# Patient Record
Sex: FEMALE | Race: WHITE | NOT HISPANIC OR LATINO | Employment: OTHER | URBAN - METROPOLITAN AREA
[De-identification: names, ages, dates, MRNs, and addresses within clinical notes are randomized per-mention and may not be internally consistent; named-entity substitution may affect disease eponyms.]

---

## 2017-01-11 ENCOUNTER — TELEPHONE (OUTPATIENT)
Dept: FAMILY MEDICINE | Facility: CLINIC | Age: 47
End: 2017-01-11

## 2017-01-11 DIAGNOSIS — E11.9 TYPE 2 DIABETES MELLITUS WITHOUT COMPLICATION, WITHOUT LONG-TERM CURRENT USE OF INSULIN (H): Primary | ICD-10-CM

## 2017-01-11 NOTE — TELEPHONE ENCOUNTER
Reason for Call:  Other prescription    Detailed comments: pt is calling stating she needs a higher dose of dulaglutide (TRULICITY) 0.75 MG/0.5ML pen  Am sugars are still to high, they are running 170-200 in the am before breakfast   Was 110 on other medication      Phone Number Patient can be reached at: Home number on file [phone] (home)    Best Time: any     Can we leave a detailed message on this number? YES    Call taken on 1/11/2017 at 9:07 AM by Erika Navarrete

## 2017-01-11 NOTE — TELEPHONE ENCOUNTER
Oksana,    Are you able to help patient with her trulicity dosing?  Looks like at her last meeting with you she was switched from Tanzeum to trulicity.  Her BS are still running high - 170-200 before breakfast.    11-30-16:  Current Diabetes Management per Patient:  Taking diabetes medications? yes, Oral Medications: Metformin - Dose:  mg 1000 mg , Time: breakfast and supper, Injectable Medications: Tanzeum 50mg once weekly, will switch to Trulicity 0.75 mg     Thank you!  Ana STEVENS RN

## 2017-01-11 NOTE — TELEPHONE ENCOUNTER
I did order her Trulicity of 1.5 mg weekly and sent it to the [facility] pharmacy.  Will discontinue the 0.75 mg.  Most patients will need the higher dose.  We need to give this about 4-6 wks to see effect.    Oksana Cook RN/JULIANE  [place] [facility] Educator

## 2017-01-12 ENCOUNTER — TELEPHONE (OUTPATIENT)
Dept: FAMILY MEDICINE | Facility: CLINIC | Age: 47
End: 2017-01-12

## 2017-01-12 NOTE — TELEPHONE ENCOUNTER
Reason for Call:  Other call back    Detailed comments: She has [employer] and the Trulicity is costing her $600.00 per month. She is wondering if she can get a coupon or something simmular that [employer] will cover.  Please advise.  This can go to [facility] too.    Phone Number Patient can be reached at: Cell number on file:    Telephone Information:   Mobile [phone]       Best Time: any    Can we leave a detailed message on this number? YES    Call taken on 1/12/2017 at 12:38 PM by Madison Kam

## 2017-01-12 NOTE — TELEPHONE ENCOUNTER
Oksana,    Patient has been on this since 11/2015.  Dose increased yesterday from 0.75mg to 1.5mg weekly yesterday.  She states this medication will cost her $600.00/mo.  Is there an alternative/coupon/program option for patient?  Patient is open to ideas, she does like the once weekly dosing of trulicity.  She does have enough insulin until Oksana is back in clinic.  Her next dose is Sunday and has enough insulin for Sunday at the 1.5mg dose.  Yun Cook, RN at 1/11/2017 11:18 AM      Status: Signed         Expand All Collapse All    I did order her Trulicity of 1.5 mg weekly and sent it to the [facility] pharmacy.  Will discontinue the 0.75 mg.  Most patients will need the higher dose.  We need to give this about 4-6 wks to see effect.    Oksana Cook RN/HAIDER  [place] [facility] Educator           Routing to provider.  Ana STEVENS RN

## 2017-01-17 NOTE — TELEPHONE ENCOUNTER
Pt is calling wanting to know what to due since she will run out of medication at the end of the week   She is available on her cell phone   She knows PCP is out of the office today   Please advise     Erika Ko  [facility] [place]

## 2017-01-18 ENCOUNTER — TELEPHONE (OUTPATIENT)
Dept: EDUCATION SERVICES | Facility: CLINIC | Age: 47
End: 2017-01-18

## 2017-01-18 DIAGNOSIS — E11.9 TYPE 2 DIABETES MELLITUS WITHOUT COMPLICATION, WITHOUT LONG-TERM CURRENT USE OF INSULIN (H): Primary | ICD-10-CM

## 2017-01-18 RX ORDER — LIRAGLUTIDE 6 MG/ML
1.2 INJECTION SUBCUTANEOUS DAILY
Qty: 6 ML | Refills: 3 | Status: SHIPPED | OUTPATIENT
Start: 2017-01-18 | End: 2017-01-18 | Stop reason: ALTCHOICE

## 2017-01-18 NOTE — TELEPHONE ENCOUNTER
I called May, she had lost the savings card I had given her, she is coming in to get a new one.  The cost may be due to her high deductible.  She will try the savings card, and I did have a coupon for a free month as well.  She is to check on Victoza as well for coverage.  She really does want the once per week Trulicity though.    She is to call us back with the results.    Oksana Cook RN/JULIANE  [place] [facility] Educator

## 2017-01-18 NOTE — TELEPHONE ENCOUNTER
I called May back, she did not wish Victoza, she was confused, wanted to go back on her Tanzeum.  I did put in RX for Tanzeum 50 mg which she was on before Trulicity.  She can use a free voucher for Trulicity and will come to pick this up.  I did tell her she needs to call her insurance company to find out the costs of RX before she has pharmacy fill her RX and then she is upset because they cost so much.  I explained about Deductibles and she was very upset that she pays so much for insurance and still needs to pay deductibles.  I mentioned that she needed to discuss this with her insurance company.      Oksana Cook RN/JULIANE  [place] [facility] Educator

## 2017-01-18 NOTE — TELEPHONE ENCOUNTER
Called May,  She lost the savings card I had given her she will try this and she has a free voucher for one month free.  She will come to the clinic to  her coupons.  I did ask her to call her new insurance company [employer] to see if they cover Trulicity or Victoza.  But she wants to use the once per week pen.    Oksana Cook RN/HAIDER  [place] [facility] Educator

## 2017-01-18 NOTE — TELEPHONE ENCOUNTER
Pt wants Victoza order.  Hoping this is covered by her Insurance?  Will not do Trulicity.  Need Victoza order.  Advise.  KPavelNICK

## 2017-02-23 DIAGNOSIS — E11.9 TYPE 2 DIABETES MELLITUS WITHOUT COMPLICATION, WITHOUT LONG-TERM CURRENT USE OF INSULIN (H): ICD-10-CM

## 2017-02-23 RX ORDER — METFORMIN HCL 500 MG
1000 TABLET, EXTENDED RELEASE 24 HR ORAL 2 TIMES DAILY WITH MEALS
Qty: 120 TABLET | Refills: 1 | Status: SHIPPED | OUTPATIENT
Start: 2017-02-23 | End: 2017-05-01

## 2017-02-23 NOTE — TELEPHONE ENCOUNTER
Metformin ER 500mg         Last Written Prescription Date: 10/17/16  Last Fill Quantity: 120, # refills: 3  Last Office Visit with G, P or [facility] prescribing provider:  10/17/16        BP Readings from Last 3 Encounters:   10/17/16 108/52   03/30/16 132/82   02/29/16 136/88     Lab Results   Component Value Date    MICROL 28 10/17/2016     No results found for: MICROALBUMIN  Creatinine   Date Value Ref Range Status   10/17/2016 0.62 0.52 - 1.04 mg/dL Final   ]  GFR Estimate   Date Value Ref Range Status   10/17/2016 >90  Non  GFR Calc   >60 mL/min/1.7m2 Final   10/19/2015 >90  Non  GFR Calc   >60 mL/min/1.7m2 Final   11/17/2014 >90  Non  GFR Calc   >60 mL/min/1.7m2 Final     GFR Estimate If Black   Date Value Ref Range Status   10/17/2016 >90   GFR Calc   >60 mL/min/1.7m2 Final   10/19/2015 >90   GFR Calc   >60 mL/min/1.7m2 Final   11/17/2014 >90   GFR Calc   >60 mL/min/1.7m2 Final     Lab Results   Component Value Date    CHOL 141 02/13/2014     Lab Results   Component Value Date    HDL 29 02/13/2014     Lab Results   Component Value Date    LDL 56 10/17/2016    LDL 43 02/13/2014     Lab Results   Component Value Date    TRIG 342 02/13/2014     Lab Results   Component Value Date    CHOLHDLRATIO 5.0 02/13/2014     Lab Results   Component Value Date    AST 36 08/27/2010     Lab Results   Component Value Date    ALT 30 01/06/2012     Lab Results   Component Value Date    A1C 7.5 10/17/2016    A1C 7.7 02/29/2016    A1C 7.2 10/19/2015    A1C 7.5 05/13/2015    A1C 6.4 11/17/2014     Potassium   Date Value Ref Range Status   10/17/2016 4.0 3.4 - 5.3 mmol/L Final       Thank You-  Holley Ovalles, [facility]

## 2017-03-10 ENCOUNTER — OFFICE VISIT (OUTPATIENT)
Dept: FAMILY MEDICINE | Facility: CLINIC | Age: 47
End: 2017-03-10
Payer: COMMERCIAL

## 2017-03-10 VITALS
SYSTOLIC BLOOD PRESSURE: 130 MMHG | DIASTOLIC BLOOD PRESSURE: 89 MMHG | RESPIRATION RATE: 18 BRPM | BODY MASS INDEX: 29.84 KG/M2 | HEIGHT: 59 IN | TEMPERATURE: 97 F | WEIGHT: 148 LBS | HEART RATE: 90 BPM

## 2017-03-10 DIAGNOSIS — R14.0 BLOATING SYMPTOM: ICD-10-CM

## 2017-03-10 DIAGNOSIS — R19.7 INTERMITTENT DIARRHEA: ICD-10-CM

## 2017-03-10 DIAGNOSIS — R19.7 DIARRHEA, UNSPECIFIED TYPE: ICD-10-CM

## 2017-03-10 DIAGNOSIS — R10.84 ABDOMINAL PAIN, GENERALIZED: Primary | ICD-10-CM

## 2017-03-10 DIAGNOSIS — E78.5 HYPERLIPIDEMIA LDL GOAL <100: ICD-10-CM

## 2017-03-10 DIAGNOSIS — E11.9 TYPE 2 DIABETES MELLITUS WITHOUT COMPLICATION, WITHOUT LONG-TERM CURRENT USE OF INSULIN (H): ICD-10-CM

## 2017-03-10 LAB
ALBUMIN SERPL-MCNC: 3.9 G/DL (ref 3.4–5)
ALP SERPL-CCNC: 71 U/L (ref 40–150)
ALT SERPL W P-5'-P-CCNC: 68 U/L (ref 0–50)
ANION GAP SERPL CALCULATED.3IONS-SCNC: 7 MMOL/L (ref 3–14)
AST SERPL W P-5'-P-CCNC: 30 U/L (ref 0–45)
BASOPHILS # BLD AUTO: 0 10E9/L (ref 0–0.2)
BASOPHILS NFR BLD AUTO: 0.6 %
BILIRUB SERPL-MCNC: 0.6 MG/DL (ref 0.2–1.3)
BUN SERPL-MCNC: 8 MG/DL (ref 7–30)
CALCIUM SERPL-MCNC: 8.5 MG/DL (ref 8.5–10.1)
CHLORIDE SERPL-SCNC: 101 MMOL/L (ref 94–109)
CO2 SERPL-SCNC: 28 MMOL/L (ref 20–32)
CREAT SERPL-MCNC: 0.58 MG/DL (ref 0.52–1.04)
DIFFERENTIAL METHOD BLD: NORMAL
EOSINOPHIL # BLD AUTO: 0.4 10E9/L (ref 0–0.7)
EOSINOPHIL NFR BLD AUTO: 5.7 %
ERYTHROCYTE [DISTWIDTH] IN BLOOD BY AUTOMATED COUNT: 12.5 % (ref 10–15)
GFR SERPL CREATININE-BSD FRML MDRD: ABNORMAL ML/MIN/1.7M2
GLUCOSE SERPL-MCNC: 221 MG/DL (ref 70–99)
HCT VFR BLD AUTO: 37.8 % (ref 35–47)
HGB BLD-MCNC: 12.8 G/DL (ref 11.7–15.7)
LYMPHOCYTES # BLD AUTO: 2 10E9/L (ref 0.8–5.3)
LYMPHOCYTES NFR BLD AUTO: 27.3 %
MCH RBC QN AUTO: 30 PG (ref 26.5–33)
MCHC RBC AUTO-ENTMCNC: 33.9 G/DL (ref 31.5–36.5)
MCV RBC AUTO: 89 FL (ref 78–100)
MONOCYTES # BLD AUTO: 0.6 10E9/L (ref 0–1.3)
MONOCYTES NFR BLD AUTO: 7.7 %
NEUTROPHILS # BLD AUTO: 4.2 10E9/L (ref 1.6–8.3)
NEUTROPHILS NFR BLD AUTO: 58.7 %
PLATELET # BLD AUTO: 170 10E9/L (ref 150–450)
POTASSIUM SERPL-SCNC: 3.9 MMOL/L (ref 3.4–5.3)
PROT SERPL-MCNC: 7.1 G/DL (ref 6.8–8.8)
RBC # BLD AUTO: 4.27 10E12/L (ref 3.8–5.2)
SODIUM SERPL-SCNC: 136 MMOL/L (ref 133–144)
WBC # BLD AUTO: 7.2 10E9/L (ref 4–11)

## 2017-03-10 PROCEDURE — 80053 COMPREHEN METABOLIC PANEL: CPT | Performed by: FAMILY MEDICINE

## 2017-03-10 PROCEDURE — 99214 OFFICE O/P EST MOD 30 MIN: CPT | Performed by: FAMILY MEDICINE

## 2017-03-10 PROCEDURE — 36415 COLL VENOUS BLD VENIPUNCTURE: CPT | Performed by: FAMILY MEDICINE

## 2017-03-10 PROCEDURE — 85025 COMPLETE CBC W/AUTO DIFF WBC: CPT | Performed by: FAMILY MEDICINE

## 2017-03-10 NOTE — PROGRESS NOTES
"  SUBJECTIVE:                                                    May Wade is a 46 year old female who presents to clinic today for the following health issues:      ABDOMINAL PAIN     Onset: 3 weeks    Description:   Character: Dull ache and Cramping  Location: mid lower abdomen  Radiation: None    Intensity: moderate 5/10    Progression of Symptoms:  worsening    Accompanying Signs & Symptoms:  Fever/Chills?: no   Gas/Bloating: no   Nausea: no   Vomitting: no   Diarrhea?: YES  Constipation:no   Dysuria or Hematuria: no    History:   Trauma: no   Previous similar pain: no    Previous tests done: none    Precipitating factors:   Does the pain change with:     Food: no      BM: YES- feels better    Urination: no     Alleviating factors:      Therapies Tried and outcome:     LMP:  not applicable      diarrhea - waking twice overnight to have loose/watery stools and still several during the day.  This has improved over the past two weeks but still has the lower abdominal bloating/cramping.     No periods after endometrial ablation.    Blood sugars have been okay.    Prior to this bowel movements were normal, no blood, normal caliber.    No recent travel (other than to [place]).     No fever/chills.      /89  Pulse 90  Temp 97  F (36.1  C) (Tympanic)  Resp 18  Ht 4' 11\" (1.499 m)  Wt 148 lb (67.1 kg)  BMI 29.89 kg/m2  EXAM: GENERAL APPEARANCE: Alert, no acute distress  RESP: lungs clear to auscultation   CV: normal rate, regular rhythm, no murmur or gallop  ABDOMEN: soft, no organomegaly or masses , tender lower abdomen mild no rebound or guarding       ASSESSMENT/PLAN:      ICD-10-CM    1. Abdominal pain, generalized R10.84 CBC with platelets differential     Comprehensive metabolic panel   2. Diarrhea, unspecified type R19.7 CBC with platelets differential     Ova and Parasite Exam Routine     Enteric Bacteria and Virus Panel by HAYLEY Stool     Clostridium difficile Toxin B PCR   3. Intermittent diarrhea " R19.7    4. Type 2 diabetes mellitus without complication, without long-term current use of insulin (H) E11.9 Comprehensive metabolic panel     Hemoglobin A1c   5. Hyperlipidemia LDL goal <100 E78.5    6. Bloating symptom R14.0 US Pelvic Complete w Transvaginal     Etiology uncertain.  Evaluate WBC.  At this time non-acute abdomen.  Check stool studies, add probiotics, minimize lactose.  Check CMP to evaluate liver/kidneys.     I think it would be reasonable to try immodium at this point for short term.  May need colonoscopy if no etiology discovered and no improvement in symptoms.      Julia Reyna M.D.      Patient Instructions   Probiotics - twice a day until the diarrhea is better and then daily    Stop dairy temporarily    immodium as needed     Return stool studies.        Return in 2 months or so for your HgbA1c            Thank you for choosing [facility].  You may be receiving a survey in the mail from [employer] regarding your visit today.  Please take a few minutes to complete and return the survey to let us know how we are doing.      Our Clinic hours are:  Mondays    7:20 am - 7 pm  Tues -  Fri  7:20 am - 5 pm    Clinic Phone: [phone]    The clinic lab opens at 7:30 am Mon - Fri and appointments are required.    [facility]. [phone]  Monday-Thursday 8 am - 7pm  Tues/Wed/Fri 8 am - 5:30 pm

## 2017-03-10 NOTE — MR AVS SNAPSHOT
After Visit Summary   3/10/2017    May Wade    MRN: [id number]           Patient Information     Date Of Birth          1970        Visit Information        Provider Department      3/10/2017 11:20 AM Julia Reyna MD [facility]        Today's Diagnoses     Abdominal pain, generalized    -  1    Diarrhea, unspecified type        Intermittent diarrhea        Type 2 diabetes mellitus without complication, without long-term current use of insulin (H)        Hyperlipidemia LDL goal <100        Bloating symptom          Care Instructions    Probiotics - twice a day until the diarrhea is better and then daily    Stop dairy temporarily    immodium as needed     Return stool studies.        Return in 2 months or so for your HgbA1c            Thank you for choosing [facility].  You may be receiving a survey in the mail from [employer] regarding your visit today.  Please take a few minutes to complete and return the survey to let us know how we are doing.      Our Clinic hours are:  Mondays    7:20 am - 7 pm  Tues -  Fri  7:20 am - 5 pm    Clinic Phone: [phone]    The clinic lab opens at 7:30 am Mon - Fri and appointments are required.    [facility] [place]  Ph. [phone]  Monday-Thursday 8 am - 7pm  Tues/Wed/Fri 8 am - 5:30 pm               Follow-ups after your visit        Future tests that were ordered for you today     Open Future Orders        Priority Expected Expires Ordered    US Pelvic Complete w Transvaginal Routine 6/8/2017 3/10/2018 3/10/2017    Ova and Parasite Exam Routine Routine  3/10/2018 3/10/2017    Enteric Bacteria and Virus Panel by HAYLEY Stool Routine  3/10/2018 3/10/2017    Clostridium difficile Toxin B PCR Routine  4/10/2017 3/10/2017    Hemoglobin A1c Routine 6/10/2017 9/10/2017 3/10/2017            Who to contact     If you have questions or need follow up information about today's clinic visit or your schedule please contact  "[facility] directly at [phone].  Normal or non-critical lab and imaging results will be communicated to you by MyChart, letter or phone within 4 business days after the clinic has received the results. If you do not hear from us within 7 days, please contact the clinic through [employer] or phone. If you have a critical or abnormal lab result, we will notify you by phone as soon as possible.  Submit refill requests through [employer] or call your pharmacy and they will forward the refill request to us. Please allow 3 business days for your refill to be completed.          Additional Information About Your Visit        [employer] ([employer]) Information     [employer] lets you send messages to your doctor, view your test results, renew your prescriptions, schedule appointments and more. To sign up, go to [email or web address] . Click on \"Log in\" on the left side of the screen, which will take you to the Welcome page. Then click on \"Sign up Now\" on the right side of the page.     You will be asked to enter the access code listed below, as well as some personal information. Please follow the directions to create your username and password.     Your access code is: [email or web address]  Expires: 2017 11:59 AM     Your access code will  in 90 days. If you need help or a new code, please call your [place] clinic or [phone].        Care EveryWhere ID     This is your Care EveryWhere ID. This could be used by other organizations to access your [place] medical records  [id number]        Your Vitals Were     Pulse Temperature Respirations Height BMI (Body Mass Index)       90 97  F (36.1  C) (Tympanic) 18 4' 11\" (1.499 m) 29.89 kg/m2        Blood Pressure from Last 3 Encounters:   03/10/17 (!) 130/91   10/17/16 108/52   16 132/82    Weight from Last 3 Encounters:   03/10/17 148 lb (67.1 kg)   10/17/16 148 lb (67.1 kg)   16 151 lb (68.5 kg)              We Performed the Following     CBC with platelets " differential     Comprehensive metabolic panel          Today's Medication Changes          These changes are accurate as of: 3/10/17 11:59 AM.  If you have any questions, ask your nurse or doctor.               These medicines have changed or have updated prescriptions.        Dose/Directions    albiglutide 50 MG pen   Commonly known as:  TANZEUM   This may have changed:  Another medication with the same name was removed. Continue taking this medication, and follow the directions you see here.   Used for:  Type 2 diabetes mellitus without complication, without long-term current use of insulin (H)   Changed by:  Yun Cook RN        Dose:  50 mg   Inject 50 mg Subcutaneous once a week   Quantity:  4 each   Refills:  3         Stop taking these medicines if you haven't already. Please contact your care team if you have questions.     dulaglutide 0.75 MG/0.5ML pen   Commonly known as:  TRULICITY   Stopped by:  Julia Reyna MD           dulaglutide 1.5 MG/0.5ML pen   Commonly known as:  TRULICITY   Stopped by:  Julia Reyna MD           oxymetazoline 0.05 % spray   Commonly known as:  AFRIN   Stopped by:  Julia Reyna MD                    Primary Care Provider Office Phone # Fax #    Julia Reyna [phone]       [address] [address]        Thank you!     Thank you for choosing [facility]  for your care. Our goal is always to provide you with excellent care. Hearing back from our patients is one way we can continue to improve our services. Please take a few minutes to complete the written survey that you may receive in the mail after your visit with us. Thank you!             Your Updated Medication List - Protect others around you: Learn how to safely use, store and throw away your medicines at www.disposemymeds.org.          This list is accurate as of: 3/10/17 11:59 AM.  Always use your most recent med list.                    Brand Name Dispense Instructions for use    albiglutide 50 MG pen    TANZEUM    4 each    Inject 50 mg Subcutaneous once a week       blood glucose monitoring lancets     1 Box    Use to test blood sugar 2 times daily or as directed.  Ok to substitute alternative if insurance prefers.       * blood glucose monitoring meter device kit     1 kit    Use to test blood sugars 2 times daily or as directed.       * blood glucose monitoring meter device kit     1 kit    Use to test blood sugars 4 daily as directed.       * blood glucose monitoring test strip    no brand specified    1 Box    1 strip by In Vitro route 2 times daily **now using Yunier Contour Next**       * blood glucose monitoring test strip    ACCU-CHEK SANDRA PLUS    200 strip    Use to test blood sugar 2 times daily or as directed.  Ok to substitute alternative if insurance prefers.       fish oil-omega-3 fatty acids 1000 MG capsule     360 capsule    Take 2 capsules by mouth 2 times daily.       * insulin pen needle 31G X 5 MM     1 Box    Use with lantus at bedtime       * insulin pen needle 32G X 4 MM    BD YULIYA U/F    100 each    Use 1 daily as directed.       losartan 25 MG tablet    COZAAR    90 tablet    Take 1 tablet (25 mg) by mouth daily       metFORMIN 500 MG 24 hr tablet    GLUCOPHAGE-XR    120 tablet    Take 2 tablets (1,000 mg) by mouth 2 times daily (with meals)       omeprazole 20 MG tablet     90 tablet    Take 1 tablet (20 mg) by mouth daily Take 30-60 minutes before a meal.       simvastatin 40 MG tablet    ZOCOR    90 tablet    Take 1 tablet (40 mg) by mouth At Bedtime       * Notice:  This list has 6 medication(s) that are the same as other medications prescribed for you. Read the directions carefully, and ask your doctor or other care provider to review them with you.

## 2017-03-10 NOTE — PATIENT INSTRUCTIONS
Probiotics - twice a day until the diarrhea is better and then daily    Stop dairy temporarily    immodium as needed     Return stool studies.        Return in 2 months or so for your HgbA1c            Thank you for choosing [facility].  You may be receiving a survey in the mail from [email or web address] regarding your visit today.  Please take a few minutes to complete and return the survey to let us know how we are doing.      Our Clinic hours are:  Mondays    7:20 am - 7 pm  Tues -  Fri  7:20 am - 5 pm    Clinic Phone: [phone]    The clinic lab opens at 7:30 am Mon - Fri and appointments are required.    [facility] [place]  Ph. [phone]  Monday-Thursday 8 am - 7pm  Tues/Wed/Fri 8 am - 5:30 pm

## 2017-03-10 NOTE — NURSING NOTE
"Chief Complaint   Patient presents with     Abdominal Pain       Initial BP (!) 130/91  Pulse 90  Temp 97  F (36.1  C) (Tympanic)  Resp 18  Ht 4' 11\" (1.499 m)  Wt 148 lb (67.1 kg)  BMI 29.89 kg/m2 Estimated body mass index is 29.89 kg/(m^2) as calculated from the following:    Height as of this encounter: 4' 11\" (1.499 m).    Weight as of this encounter: 148 lb (67.1 kg).  Medication Reconciliation: complete    "

## 2017-03-20 ENCOUNTER — HOSPITAL ENCOUNTER (OUTPATIENT)
Dept: ULTRASOUND IMAGING | Facility: CLINIC | Age: 47
Discharge: HOME | End: 2017-03-20
Attending: FAMILY MEDICINE | Admitting: FAMILY MEDICINE
Payer: COMMERCIAL

## 2017-03-20 DIAGNOSIS — R14.0 BLOATING SYMPTOM: ICD-10-CM

## 2017-03-20 PROCEDURE — 76830 TRANSVAGINAL US NON-OB: CPT

## 2017-03-21 DIAGNOSIS — R19.7 DIARRHEA, UNSPECIFIED TYPE: ICD-10-CM

## 2017-03-21 PROCEDURE — 87493 C DIFF AMPLIFIED PROBE: CPT | Performed by: FAMILY MEDICINE

## 2017-03-21 PROCEDURE — 87506 IADNA-DNA/RNA PROBE TQ 6-11: CPT | Performed by: FAMILY MEDICINE

## 2017-03-21 PROCEDURE — 87177 OVA AND PARASITES SMEARS: CPT | Performed by: FAMILY MEDICINE

## 2017-03-21 PROCEDURE — 87209 SMEAR COMPLEX STAIN: CPT | Performed by: FAMILY MEDICINE

## 2017-03-22 LAB
C DIFF TOX B STL QL: NORMAL
CAMPYLOBACTER GROUP BY NAT: NOT DETECTED
ENTERIC PATHOGEN COMMENT: NORMAL
MICRO REPORT STATUS: NORMAL
NOROVIRUS I AND II BY NAT: NOT DETECTED
O+P STL MICRO: NORMAL
ROTAVIRUS A BY NAT: NOT DETECTED
SALMONELLA SPECIES BY NAT: NOT DETECTED
SHIGA TOXIN 1 GENE BY NAT: NOT DETECTED
SHIGA TOXIN 2 GENE BY NAT: NOT DETECTED
SHIGELLA SP+EIEC IPAH STL QL NAA+PROBE: NOT DETECTED
SPECIMEN SOURCE: NORMAL
SPECIMEN SOURCE: NORMAL
VIBRIO GROUP BY NAT: NOT DETECTED
YERSINIA ENTEROCOLITICA BY NAT: NOT DETECTED

## 2017-03-27 ENCOUNTER — RADIANT APPOINTMENT (OUTPATIENT)
Dept: MAMMOGRAPHY | Facility: CLINIC | Age: 47
End: 2017-03-27
Attending: FAMILY MEDICINE
Payer: COMMERCIAL

## 2017-03-27 DIAGNOSIS — Z12.31 VISIT FOR SCREENING MAMMOGRAM: ICD-10-CM

## 2017-03-27 PROCEDURE — G0202 SCR MAMMO BI INCL CAD: HCPCS | Mod: TC

## 2017-05-01 DIAGNOSIS — E11.9 TYPE 2 DIABETES MELLITUS WITHOUT COMPLICATION, WITHOUT LONG-TERM CURRENT USE OF INSULIN (H): ICD-10-CM

## 2017-05-01 DIAGNOSIS — I10 ESSENTIAL HYPERTENSION WITH GOAL BLOOD PRESSURE LESS THAN 140/90: ICD-10-CM

## 2017-05-01 DIAGNOSIS — R05.9 COUGH: ICD-10-CM

## 2017-05-01 NOTE — TELEPHONE ENCOUNTER
losartan (COZAAR) 25 MG tablet      Last Written Prescription Date: 10/17/16  Last Fill Quantity: 90, # refills: 1  Last Office Visit with [facility], [facility] or [facility] prescribing provider: 3/10/17       Potassium   Date Value Ref Range Status   03/10/2017 3.9 3.4 - 5.3 mmol/L Final     Creatinine   Date Value Ref Range Status   03/10/2017 0.58 0.52 - 1.04 mg/dL Final     BP Readings from Last 3 Encounters:   03/10/17 130/89   10/17/16 108/52   03/30/16 132/82     metFORMIN (GLUCOPHAGE-XR) 500 MG 24 hr tablet         Last Written Prescription Date: 2/33/17  Last Fill Quantity: 120, # refills: 1  Last Office Visit with [facility], [facility] or [facility] prescribing provider:  3/10/17        BP Readings from Last 3 Encounters:   03/10/17 130/89   10/17/16 108/52   03/30/16 132/82     Lab Results   Component Value Date    MICROL 28 10/17/2016     Lab Results   Component Value Date    UMALCR 12.74 10/17/2016     Creatinine   Date Value Ref Range Status   03/10/2017 0.58 0.52 - 1.04 mg/dL Final   ]  GFR Estimate   Date Value Ref Range Status   03/10/2017 >90  Non  GFR Calc   >60 mL/min/1.7m2 Final   10/17/2016 >90  Non  GFR Calc   >60 mL/min/1.7m2 Final   10/19/2015 >90  Non  GFR Calc   >60 mL/min/1.7m2 Final     GFR Estimate If Black   Date Value Ref Range Status   03/10/2017 >90   GFR Calc   >60 mL/min/1.7m2 Final   10/17/2016 >90   GFR Calc   >60 mL/min/1.7m2 Final   10/19/2015 >90   GFR Calc   >60 mL/min/1.7m2 Final     Lab Results   Component Value Date    CHOL 141 02/13/2014     Lab Results   Component Value Date    HDL 29 02/13/2014     Lab Results   Component Value Date    LDL 56 10/17/2016    LDL 43 02/13/2014     Lab Results   Component Value Date    TRIG 342 02/13/2014     Lab Results   Component Value Date    CHOLHDLRATIO 5.0 02/13/2014     Lab Results   Component Value Date    AST 30 03/10/2017     Lab Results   Component Value Date     ALT 68 03/10/2017     Lab Results   Component Value Date    A1C 7.5 10/17/2016    A1C 7.7 02/29/2016    A1C 7.2 10/19/2015    A1C 7.5 05/13/2015    A1C 6.4 11/17/2014     Potassium   Date Value Ref Range Status   03/10/2017 3.9 3.4 - 5.3 mmol/L Final       omeprazole 20 MG tablet      Last Written Prescription Date: 10/17/16  Last Fill Quantity: 90,  # refills: 1   Last Office Visit with [facility], [facility] or [facility] prescribing provider: 3/10/17

## 2017-05-02 RX ORDER — LOSARTAN POTASSIUM 25 MG/1
TABLET ORAL
Qty: 90 TABLET | Refills: 0 | Status: SHIPPED | OUTPATIENT
Start: 2017-05-02 | End: 2017-08-15

## 2017-05-02 RX ORDER — METFORMIN HCL 500 MG
TABLET, EXTENDED RELEASE 24 HR ORAL
Qty: 120 TABLET | Refills: 0 | Status: SHIPPED | OUTPATIENT
Start: 2017-05-02 | End: 2017-06-07

## 2017-05-02 NOTE — TELEPHONE ENCOUNTER
Prescription approved per [facility] Refill Protocol.    Yaneli CUNNINGHAM RN

## 2017-05-30 DIAGNOSIS — E11.9 TYPE 2 DIABETES MELLITUS WITHOUT COMPLICATION, WITHOUT LONG-TERM CURRENT USE OF INSULIN (H): ICD-10-CM

## 2017-05-31 NOTE — TELEPHONE ENCOUNTER
albiglutide (TANZEUM) 50 MG pen         Last Written Prescription Date: 1/18/47  Last Fill Quantity: 4, # refills: 3  Last Office Visit with G, P or [facility] prescribing provider:  3/10/15        BP Readings from Last 3 Encounters:   03/10/17 130/89   10/17/16 108/52   03/30/16 132/82     Lab Results   Component Value Date    MICROL 28 10/17/2016     Lab Results   Component Value Date    UMALCR 12.74 10/17/2016     Creatinine   Date Value Ref Range Status   03/10/2017 0.58 0.52 - 1.04 mg/dL Final   ]  GFR Estimate   Date Value Ref Range Status   03/10/2017 >90  Non  GFR Calc   >60 mL/min/1.7m2 Final   10/17/2016 >90  Non  GFR Calc   >60 mL/min/1.7m2 Final   10/19/2015 >90  Non  GFR Calc   >60 mL/min/1.7m2 Final     GFR Estimate If Black   Date Value Ref Range Status   03/10/2017 >90   GFR Calc   >60 mL/min/1.7m2 Final   10/17/2016 >90   GFR Calc   >60 mL/min/1.7m2 Final   10/19/2015 >90   GFR Calc   >60 mL/min/1.7m2 Final     Lab Results   Component Value Date    CHOL 141 02/13/2014     Lab Results   Component Value Date    HDL 29 02/13/2014     Lab Results   Component Value Date    LDL 56 10/17/2016    LDL 43 02/13/2014     Lab Results   Component Value Date    TRIG 342 02/13/2014     Lab Results   Component Value Date    CHOLHDLRATIO 5.0 02/13/2014     Lab Results   Component Value Date    AST 30 03/10/2017     Lab Results   Component Value Date    ALT 68 03/10/2017     Lab Results   Component Value Date    A1C 7.5 10/17/2016    A1C 7.7 02/29/2016    A1C 7.2 10/19/2015    A1C 7.5 05/13/2015    A1C 6.4 11/17/2014     Potassium   Date Value Ref Range Status   03/10/2017 3.9 3.4 - 5.3 mmol/L Final

## 2017-06-07 DIAGNOSIS — E11.9 TYPE 2 DIABETES MELLITUS WITHOUT COMPLICATION, WITHOUT LONG-TERM CURRENT USE OF INSULIN (H): ICD-10-CM

## 2017-06-07 RX ORDER — METFORMIN HCL 500 MG
1000 TABLET, EXTENDED RELEASE 24 HR ORAL 2 TIMES DAILY WITH MEALS
Qty: 120 TABLET | Refills: 0 | Status: SHIPPED | OUTPATIENT
Start: 2017-06-07 | End: 2017-07-07

## 2017-06-07 NOTE — TELEPHONE ENCOUNTER
Metformin         Last Written Prescription Date: 05/02/17  Last Fill Quantity: 120, # refills: 0  Last Office Visit with [facility], [facility] or [facility] prescribing provider:  03/10/17        BP Readings from Last 3 Encounters:   03/10/17 130/89   10/17/16 108/52   03/30/16 132/82     Lab Results   Component Value Date    MICROL 28 10/17/2016     Lab Results   Component Value Date    UMALCR 12.74 10/17/2016     Creatinine   Date Value Ref Range Status   03/10/2017 0.58 0.52 - 1.04 mg/dL Final   ]  GFR Estimate   Date Value Ref Range Status   03/10/2017 >90  Non  GFR Calc   >60 mL/min/1.7m2 Final   10/17/2016 >90  Non  GFR Calc   >60 mL/min/1.7m2 Final   10/19/2015 >90  Non  GFR Calc   >60 mL/min/1.7m2 Final     GFR Estimate If Black   Date Value Ref Range Status   03/10/2017 >90   GFR Calc   >60 mL/min/1.7m2 Final   10/17/2016 >90   GFR Calc   >60 mL/min/1.7m2 Final   10/19/2015 >90   GFR Calc   >60 mL/min/1.7m2 Final     Lab Results   Component Value Date    CHOL 141 02/13/2014     Lab Results   Component Value Date    HDL 29 02/13/2014     Lab Results   Component Value Date    LDL 56 10/17/2016    LDL 43 02/13/2014     Lab Results   Component Value Date    TRIG 342 02/13/2014     Lab Results   Component Value Date    CHOLHDLRATIO 5.0 02/13/2014     Lab Results   Component Value Date    AST 30 03/10/2017     Lab Results   Component Value Date    ALT 68 03/10/2017     Lab Results   Component Value Date    A1C 7.5 10/17/2016    A1C 7.7 02/29/2016    A1C 7.2 10/19/2015    A1C 7.5 05/13/2015    A1C 6.4 11/17/2014     Potassium   Date Value Ref Range Status   03/10/2017 3.9 3.4 - 5.3 mmol/L Final

## 2017-06-27 ENCOUNTER — OFFICE VISIT (OUTPATIENT)
Dept: FAMILY MEDICINE | Facility: CLINIC | Age: 47
End: 2017-06-27
Payer: COMMERCIAL

## 2017-06-27 VITALS
HEIGHT: 59 IN | DIASTOLIC BLOOD PRESSURE: 76 MMHG | BODY MASS INDEX: 29.68 KG/M2 | HEART RATE: 80 BPM | SYSTOLIC BLOOD PRESSURE: 124 MMHG | WEIGHT: 147.2 LBS

## 2017-06-27 DIAGNOSIS — E11.9 TYPE 2 DIABETES MELLITUS WITHOUT COMPLICATION, WITHOUT LONG-TERM CURRENT USE OF INSULIN (H): ICD-10-CM

## 2017-06-27 DIAGNOSIS — T80.90XA INJECTION SITE REACTION, INITIAL ENCOUNTER: Primary | ICD-10-CM

## 2017-06-27 PROCEDURE — 99213 OFFICE O/P EST LOW 20 MIN: CPT | Performed by: FAMILY MEDICINE

## 2017-06-27 NOTE — MR AVS SNAPSHOT
After Visit Summary   6/27/2017    May Wade    MRN: [id number]           Patient Information     Date Of Birth          1970        Visit Information        Provider Department      6/27/2017 9:20 AM ELYSE Og MD [facility]        Today's Diagnoses     Injection site reaction, initial encounter    -  1    Type 2 diabetes mellitus without complication, without long-term current use of insulin (H)          Care Instructions    Thank you for choosing [facility].  You may be receiving a survey in the mail from [employer] regarding your visit today.  Please take a few minutes to complete and return the survey to let us know how we are doing.  Our Clinic hours are:  Mondays    7:20 am - 7 pm  Tues -  Fri  7:20 am - 5 pm  Clinic Phone: [phone]  The clinic lab opens at 7:30 am Mon - Fri and appointments are required.  [facility]  Ph. [phone]  Monday-Thursday 8 am - 7pm  Tues/Wed/Fri 8 am - 5:30 pm            Follow-ups after your visit        Who to contact     If you have questions or need follow up information about today's clinic visit or your schedule please contact [facility] directly at [phone].  Normal or non-critical lab and imaging results will be communicated to you by MyChart, letter or phone within 4 business days after the clinic has received the results. If you do not hear from us within 7 days, please contact the clinic through [employer] or phone. If you have a critical or abnormal lab result, we will notify you by phone as soon as possible.  Submit refill requests through [employer] or call your pharmacy and they will forward the refill request to us. Please allow 3 business days for your refill to be completed.          Additional Information About Your Visit        [employer] Information     [employer] lets you send messages to your doctor, view your test results, renew your prescriptions, schedule  "appointments and more. To sign up, go to [email or web address] . Click on \"Log in\" on the left side of the screen, which will take you to the Welcome page. Then click on \"Sign up Now\" on the right side of the page.     You will be asked to enter the access code listed below, as well as some personal information. Please follow the directions to create your username and password.     Your access code is: [email or web address]  Expires: 2017 11:43 AM     Your access code will  in 90 days. If you need help or a new code, please call your [place] clinic or [phone].        Care EveryWhere ID     This is your Care EveryWhere ID. This could be used by other organizations to access your [place] medical records  [id number]        Your Vitals Were     Pulse Height BMI (Body Mass Index)             80 4' 11\" (1.499 m) 29.73 kg/m2          Blood Pressure from Last 3 Encounters:   17 124/76   03/10/17 130/89   10/17/16 108/52    Weight from Last 3 Encounters:   17 147 lb 3.2 oz (66.8 kg)   03/10/17 148 lb (67.1 kg)   10/17/16 148 lb (67.1 kg)              Today, you had the following     No orders found for display       Primary Care Provider Office Phone # Fax #    Julia Reyna [phone]       [facility] [address] [facility] [id number]        Equal Access to Services     [facility] : Hadii regina barney hadasho Soorestesali, waaxda luqadaha, qaybta kaalmada adeegyada, avery scott. So [facility] [phone].    ATENCIÓN: Si habla español, tiene a bustillo disposición servicios gratuitos de asistencia lingüística. Llame al [phone].    We comply with applicable federal civil rights laws and [place] laws. We do not discriminate on the basis of race, color, national origin, age, disability sex, sexual orientation or gender identity.            Thank you!     Thank you for choosing [facility]  for your care. Our goal is always to provide " you with excellent care. Hearing back from our patients is one way we can continue to improve our services. Please take a few minutes to complete the written survey that you may receive in the mail after your visit with us. Thank you!             Your Updated Medication List - Protect others around you: Learn how to safely use, store and throw away your medicines at www.disposemymeds.org.          This list is accurate as of: 6/27/17 11:43 AM.  Always use your most recent med list.                   Brand Name Dispense Instructions for use Diagnosis    blood glucose monitoring lancets     1 Box    Use to test blood sugar 2 times daily or as directed.  Ok to substitute alternative if insurance prefers.    Type 2 diabetes mellitus without complication, without long-term current use of insulin (H)       * blood glucose monitoring meter device kit     1 kit    Use to test blood sugars 2 times daily or as directed.    Type 2 diabetes, HbA1C goal < 8% (H)       * blood glucose monitoring meter device kit     1 kit    Use to test blood sugars 4 daily as directed.    Uncomplicated type 2 diabetes mellitus (H)       * blood glucose monitoring test strip    no brand specified    1 Box    1 strip by In Vitro route 2 times daily **now using Yunier Contour Next**    Uncomplicated type 2 diabetes mellitus (H)       * blood glucose monitoring test strip    ACCU-CHEK SANDRA PLUS    200 strip    Use to test blood sugar 2 times daily or as directed.  Ok to substitute alternative if insurance prefers.    Type 2 diabetes mellitus without complication, without long-term current use of insulin (H)       fish oil-omega-3 fatty acids 1000 MG capsule     360 capsule    Take 2 capsules by mouth 2 times daily.    Hypertriglyceridemia       * insulin pen needle 31G X 5 MM     1 Box    Use with lantus at bedtime    Type 2 diabetes, HbA1C goal < 8% (H)       * insulin pen needle 32G X 4 MM    BD YULIYA U/F    100 each    Use 1 daily as directed.     Type 2 diabetes mellitus without complication, without long-term current use of insulin (H)       losartan 25 MG tablet    COZAAR    90 tablet    TAKE ONE TABLET BY MOUTH EVERY DAY    Essential hypertension with goal blood pressure less than 140/90       metFORMIN 500 MG 24 hr tablet    GLUCOPHAGE-XR    120 tablet    Take 2 tablets (1,000 mg) by mouth 2 times daily (with meals) (Needs lab work)    Type 2 diabetes mellitus without complication, without long-term current use of insulin (H)       omeprazole 20 MG CR capsule    priLOSEC    90 capsule    TAKE ONE CAPSULE BY MOUTH DAILY. TAKE 30 TO 60 MINUTES BEFORE A MEAL    Cough       simvastatin 40 MG tablet    ZOCOR    90 tablet    Take 1 tablet (40 mg) by mouth At Bedtime    Hyperlipidemia LDL goal <100       TANZEUM 50 MG pen   Generic drug:  albiglutide     4 each    INJECT 50 MG SUBCUTANEOUSLY ONCE WEEKLY    Type 2 diabetes mellitus without complication, without long-term current use of insulin (H)       * Notice:  This list has 6 medication(s) that are the same as other medications prescribed for you. Read the directions carefully, and ask your doctor or other care provider to review them with you.

## 2017-06-27 NOTE — NURSING NOTE
"Chief Complaint   Patient presents with     Patient Request     Pt. has a large red spot on her Left thigh from an injection 8 days ago, she states the it is itchy. She tried some inti itch cream on it and it did not help at all.       Initial /76 (BP Location: Right arm, Patient Position: Chair, Cuff Size: Adult Regular)  Pulse 80  Ht 4' 11\" (1.499 m)  Wt 147 lb 3.2 oz (66.8 kg)  BMI 29.73 kg/m2 Estimated body mass index is 29.73 kg/(m^2) as calculated from the following:    Height as of this encounter: 4' 11\" (1.499 m).    Weight as of this encounter: 147 lb 3.2 oz (66.8 kg).  Medication Reconciliation: complete    "

## 2017-06-27 NOTE — PROGRESS NOTES
"  SUBJECTIVE:                                                    May Wade is a 46 year old female who presents to clinic today for the following health issues:      Chief Complaint   Patient presents with     Patient Request     Pt. has a large red spot on her Left thigh from a Tanzeum injection 8 days ago, she states the it is itchy. She tried some inti itch cream on it and it did not help at all.     She has been using the TENS unit for her diabetes for almost a year and has never had a significant reaction until now. She has not tried any oral medication for the itching such as Benadryl or Zyrtec. She mainly wanted to come in and make sure it was okay before giving herself another injection, and to make sure it was not infected          Problem list and histories reviewed & adjusted, as indicated.  Additional history: none        Reviewed and updated as needed this visit by clinical staff  Tobacco  Allergies  Med Hx  Surg Hx  Fam Hx  Soc Hx      Reviewed and updated as needed this visit by Provider               ROS:  Constitutional, HEENT, cardiovascular, pulmonary, gi and gu systems are negative, except as otherwise noted.        OBJECTIVE:                                                    /76 (BP Location: Right arm, Patient Position: Chair, Cuff Size: Adult Regular)  Pulse 80  Ht 4' 11\" (1.499 m)  Wt 147 lb 3.2 oz (66.8 kg)  BMI 29.73 kg/m2    GENERAL: healthy, alert and no distress  EYES: Eyes grossly normal to inspection, extraocular movements - intact, and PERRL  SKIN: Left anterior thigh shows a 3-1/2 cm area of mild erythema with just slight induration, no tenderness or warmth         ASSESSMENT/PLAN:                                                    ASSESSMENT:  1. Injection site reaction, initial encounter    2. Type 2 diabetes mellitus without complication, without long-term current use of insulin (H)        PLAN:  Injection site reaction is listed as a side effect of this " medication. Unclear as to why it occurred this time and hasn't previously. She was reassured that it does not look infected and that it should be fine for her to go ahead and give her next injection as scheduled this week. Obviously, if she gets a strong reaction again or a worsening reaction, may be will need to switch to a different medication      Patient Instructions   Thank you for choosing [facility].  You may be receiving a survey in the mail from [employer] regarding your visit today.  Please take a few minutes to complete and return the survey to let us know how we are doing.  Our Clinic hours are:  Mondays    7:20 am - 7 pm  Tues -  Fri  7:20 am - 5 pm  Clinic Phone: [phone]  The clinic lab opens at 7:30 am Mon - Fri and appointments are required.  [facility] [place]  Ph. [phone]  Monday-Thursday 8 am - 7pm  Tues/Wed/Fri 8 am - 5:30 pm        ELYSE Og  [facility]

## 2017-06-27 NOTE — PATIENT INSTRUCTIONS
Thank you for choosing [facility].  You may be receiving a survey in the mail from [facility] regarding your visit today.  Please take a few minutes to complete and return the survey to let us know how we are doing.  Our Clinic hours are:  Mondays    7:20 am - 7 pm  Tues -  Fri  7:20 am - 5 pm  Clinic Phone: [phone]  The clinic lab opens at 7:30 am Mon - Fri and appointments are required.  [facility]. [phone]  Monday-Thursday 8 am - 7pm  Tues/Wed/Fri 8 am - 5:30 pm

## 2017-07-07 DIAGNOSIS — E11.9 TYPE 2 DIABETES MELLITUS WITHOUT COMPLICATION, WITHOUT LONG-TERM CURRENT USE OF INSULIN (H): ICD-10-CM

## 2017-07-07 LAB — HBA1C MFR BLD: 8.1 % (ref 4.3–6)

## 2017-07-07 PROCEDURE — 36415 COLL VENOUS BLD VENIPUNCTURE: CPT | Performed by: FAMILY MEDICINE

## 2017-07-07 PROCEDURE — 83036 HEMOGLOBIN GLYCOSYLATED A1C: CPT | Performed by: FAMILY MEDICINE

## 2017-07-07 NOTE — TELEPHONE ENCOUNTER
Metformin         Last Written Prescription Date: 06/07/17  Last Fill Quantity: 120, # refills: 0  Last Office Visit with [facility], [facility] or [facility] prescribing provider:  06/27/17        BP Readings from Last 3 Encounters:   06/27/17 124/76   03/10/17 130/89   10/17/16 108/52     Lab Results   Component Value Date    MICROL 28 10/17/2016     Lab Results   Component Value Date    UMALCR 12.74 10/17/2016     Creatinine   Date Value Ref Range Status   03/10/2017 0.58 0.52 - 1.04 mg/dL Final   ]  GFR Estimate   Date Value Ref Range Status   03/10/2017 >90  Non  GFR Calc   >60 mL/min/1.7m2 Final   10/17/2016 >90  Non  GFR Calc   >60 mL/min/1.7m2 Final   10/19/2015 >90  Non  GFR Calc   >60 mL/min/1.7m2 Final     GFR Estimate If Black   Date Value Ref Range Status   03/10/2017 >90   GFR Calc   >60 mL/min/1.7m2 Final   10/17/2016 >90   GFR Calc   >60 mL/min/1.7m2 Final   10/19/2015 >90   GFR Calc   >60 mL/min/1.7m2 Final     Lab Results   Component Value Date    CHOL 141 02/13/2014     Lab Results   Component Value Date    HDL 29 02/13/2014     Lab Results   Component Value Date    LDL 56 10/17/2016    LDL 43 02/13/2014     Lab Results   Component Value Date    TRIG 342 02/13/2014     Lab Results   Component Value Date    CHOLHDLRATIO 5.0 02/13/2014     Lab Results   Component Value Date    AST 30 03/10/2017     Lab Results   Component Value Date    ALT 68 03/10/2017     Lab Results   Component Value Date    A1C 8.1 07/07/2017    A1C 7.5 10/17/2016    A1C 7.7 02/29/2016    A1C 7.2 10/19/2015    A1C 7.5 05/13/2015     Potassium   Date Value Ref Range Status   03/10/2017 3.9 3.4 - 5.3 mmol/L Final

## 2017-07-12 RX ORDER — METFORMIN HCL 500 MG
TABLET, EXTENDED RELEASE 24 HR ORAL
Qty: 120 TABLET | Refills: 0 | Status: SHIPPED | OUTPATIENT
Start: 2017-07-12 | End: 2017-08-15

## 2017-07-12 NOTE — TELEPHONE ENCOUNTER
Prescription approved per [facility] Refill Protocol.    Yaneli CUNNINGHAM RN

## 2017-08-01 DIAGNOSIS — R05.9 COUGH: ICD-10-CM

## 2017-08-01 NOTE — TELEPHONE ENCOUNTER
Prescription approved per [facility] Refill Protocol.    Lida STEVENS Rn

## 2017-08-01 NOTE — TELEPHONE ENCOUNTER
Omeprazole      Last Written Prescription Date: 05/02/2017  Last Fill Quantity: 90,  # refills: 0   Last Office Visit with [facility] or [facility] prescribing provider: 6/27/2017

## 2017-08-15 DIAGNOSIS — E11.9 TYPE 2 DIABETES MELLITUS WITHOUT COMPLICATION, WITHOUT LONG-TERM CURRENT USE OF INSULIN (H): ICD-10-CM

## 2017-08-15 DIAGNOSIS — I10 ESSENTIAL HYPERTENSION WITH GOAL BLOOD PRESSURE LESS THAN 140/90: ICD-10-CM

## 2017-08-15 RX ORDER — LOSARTAN POTASSIUM 25 MG/1
TABLET ORAL
Qty: 90 TABLET | Refills: 0 | Status: SHIPPED | OUTPATIENT
Start: 2017-08-15 | End: 2017-10-30

## 2017-08-15 RX ORDER — METFORMIN HCL 500 MG
TABLET, EXTENDED RELEASE 24 HR ORAL
Qty: 120 TABLET | Refills: 0 | Status: SHIPPED | OUTPATIENT
Start: 2017-08-15 | End: 2017-10-09

## 2017-08-15 NOTE — TELEPHONE ENCOUNTER
Prescription approved per [facility] Refill Protocol.    Yaneli CUNNINGHAM RN

## 2017-08-15 NOTE — TELEPHONE ENCOUNTER
Losartan      Last Written Prescription Date: 5/02/2017  Last Fill Quantity: 90, # refills: 0  [place] [facility] Visit with [facility] or  [facility] prescribing provider: 06/27/2017       Potassium   Date Value Ref Range Status   03/10/2017 3.9 3.4 - 5.3 mmol/L Final     Creatinine   Date Value Ref Range Status   03/10/2017 0.58 0.52 - 1.04 mg/dL Final     BP Readings from Last 3 Encounters:   06/27/17 124/76   03/10/17 130/89   10/17/16 108/52           Metformin         Last Written Prescription Date: 07/12/2017  Last Fill Quantity: 120, # refills: 0  Last Office Visit with [facility], [facility] or [facility] prescribing provider:  06/27/2017        BP Readings from Last 3 Encounters:   06/27/17 124/76   03/10/17 130/89   10/17/16 108/52     Lab Results   Component Value Date    MICROL 28 10/17/2016     Lab Results   Component Value Date    UMALCR 12.74 10/17/2016     Creatinine   Date Value Ref Range Status   03/10/2017 0.58 0.52 - 1.04 mg/dL Final   ]  GFR Estimate   Date Value Ref Range Status   03/10/2017 >90  Non  GFR Calc   >60 mL/min/1.7m2 Final   10/17/2016 >90  Non  GFR Calc   >60 mL/min/1.7m2 Final   10/19/2015 >90  Non  GFR Calc   >60 mL/min/1.7m2 Final     GFR Estimate If Black   Date Value Ref Range Status   03/10/2017 >90   GFR Calc   >60 mL/min/1.7m2 Final   10/17/2016 >90   GFR Calc   >60 mL/min/1.7m2 Final   10/19/2015 >90   GFR Calc   >60 mL/min/1.7m2 Final     Lab Results   Component Value Date    CHOL 141 02/13/2014     Lab Results   Component Value Date    HDL 29 02/13/2014     Lab Results   Component Value Date    LDL 56 10/17/2016    LDL 43 02/13/2014     Lab Results   Component Value Date    TRIG 342 02/13/2014     Lab Results   Component Value Date    CHOLHDLRATIO 5.0 02/13/2014     Lab Results   Component Value Date    AST 30 03/10/2017     Lab Results   Component Value Date    ALT 68 03/10/2017     Lab Results    Component Value Date    A1C 8.1 07/07/2017    A1C 7.5 10/17/2016    A1C 7.7 02/29/2016    A1C 7.2 10/19/2015    A1C 7.5 05/13/2015     Potassium   Date Value Ref Range Status   03/10/2017 3.9 3.4 - 5.3 mmol/L Final

## 2017-10-09 DIAGNOSIS — E11.9 TYPE 2 DIABETES MELLITUS WITHOUT COMPLICATION, WITHOUT LONG-TERM CURRENT USE OF INSULIN (H): ICD-10-CM

## 2017-10-10 NOTE — TELEPHONE ENCOUNTER
METFORMIN HCL ER 500MG TB24         Last Written Prescription Date: 8/15/2017  Last Fill Quantity: 120, # refills: 0  Last [facility] Visit with [facility] or [facility] prescribing provider:  6/27/2017        BP Readings from Last 3 Encounters:   06/27/17 124/76   03/10/17 130/89   10/17/16 108/52     Lab Results   Component Value Date    MICROL 28 10/17/2016     Lab Results   Component Value Date    UMALCR 12.74 10/17/2016     Creatinine   Date Value Ref Range Status   03/10/2017 0.58 0.52 - 1.04 mg/dL Final   ]  GFR Estimate   Date Value Ref Range Status   03/10/2017 >90  Non  GFR Calc   >60 mL/min/1.7m2 Final   10/17/2016 >90  Non  GFR Calc   >60 mL/min/1.7m2 Final   10/19/2015 >90  Non  GFR Calc   >60 mL/min/1.7m2 Final     GFR Estimate If Black   Date Value Ref Range Status   03/10/2017 >90   GFR Calc   >60 mL/min/1.7m2 Final   10/17/2016 >90   GFR Calc   >60 mL/min/1.7m2 Final   10/19/2015 >90   GFR Calc   >60 mL/min/1.7m2 Final     Lab Results   Component Value Date    CHOL 141 02/13/2014     Lab Results   Component Value Date    HDL 29 02/13/2014     Lab Results   Component Value Date    LDL 56 10/17/2016    LDL 43 02/13/2014     Lab Results   Component Value Date    TRIG 342 02/13/2014     Lab Results   Component Value Date    CHOLHDLRATIO 5.0 02/13/2014     Lab Results   Component Value Date    AST 30 03/10/2017     Lab Results   Component Value Date    ALT 68 03/10/2017     Lab Results   Component Value Date    A1C 8.1 07/07/2017    A1C 7.5 10/17/2016    A1C 7.7 02/29/2016    A1C 7.2 10/19/2015    A1C 7.5 05/13/2015     Potassium   Date Value Ref Range Status   03/10/2017 3.9 3.4 - 5.3 mmol/L Final

## 2017-10-12 ENCOUNTER — TELEPHONE (OUTPATIENT)
Dept: FAMILY MEDICINE | Facility: CLINIC | Age: 47
End: 2017-10-12

## 2017-10-12 DIAGNOSIS — E11.9 TYPE 2 DIABETES MELLITUS WITHOUT COMPLICATION, WITHOUT LONG-TERM CURRENT USE OF INSULIN (H): Primary | ICD-10-CM

## 2017-10-12 NOTE — TELEPHONE ENCOUNTER
Met in consultation for:  Diabetes Mellitus    Parameters Addressed:  A1C    Recommended follow-up:  Lab only appt    Considerations:  Future Labs - HgbA1c is due - last check was higher than goal, it would be great to get a repeat to see if this has improved.    Julia Reyna M.D.

## 2017-10-16 RX ORDER — METFORMIN HCL 500 MG
TABLET, EXTENDED RELEASE 24 HR ORAL
Qty: 120 TABLET | Refills: 0 | Status: SHIPPED | OUTPATIENT
Start: 2017-10-16 | End: 2017-11-13

## 2017-10-20 DIAGNOSIS — E11.9 TYPE 2 DIABETES MELLITUS WITHOUT COMPLICATION, WITHOUT LONG-TERM CURRENT USE OF INSULIN (H): ICD-10-CM

## 2017-10-20 LAB — HBA1C MFR BLD: 8.6 % (ref 4.3–6)

## 2017-10-20 PROCEDURE — 36415 COLL VENOUS BLD VENIPUNCTURE: CPT | Performed by: FAMILY MEDICINE

## 2017-10-20 PROCEDURE — 83036 HEMOGLOBIN GLYCOSYLATED A1C: CPT | Performed by: FAMILY MEDICINE

## 2017-10-20 NOTE — PROGRESS NOTES
Unfortunately HgbA1c is higher at 8.6%.  I don't see a follow up scheduled with either myself or Diabetic ed, please encourage patient to do so.    Julia Reyna M.D.

## 2017-10-30 DIAGNOSIS — E78.5 HYPERLIPIDEMIA LDL GOAL <100: ICD-10-CM

## 2017-10-30 DIAGNOSIS — I10 ESSENTIAL HYPERTENSION WITH GOAL BLOOD PRESSURE LESS THAN 140/90: ICD-10-CM

## 2017-11-01 RX ORDER — SIMVASTATIN 40 MG
TABLET ORAL
Qty: 30 TABLET | Refills: 0 | Status: SHIPPED | OUTPATIENT
Start: 2017-11-01 | End: 2017-12-12

## 2017-11-01 RX ORDER — LOSARTAN POTASSIUM 25 MG/1
TABLET ORAL
Qty: 30 TABLET | Refills: 0 | Status: SHIPPED | OUTPATIENT
Start: 2017-11-01 | End: 2017-12-12

## 2017-11-01 NOTE — TELEPHONE ENCOUNTER
Refilled for one month. Patient is due to be seen and needs Lipid lab. Lipid panel ordered, reminder placed on pharmacy note to be seen. NICK Snow

## 2017-11-08 ENCOUNTER — TELEPHONE (OUTPATIENT)
Dept: EDUCATION SERVICES | Facility: CLINIC | Age: 47
End: 2017-11-08

## 2017-11-08 ENCOUNTER — ALLIED HEALTH/NURSE VISIT (OUTPATIENT)
Dept: EDUCATION SERVICES | Facility: CLINIC | Age: 47
End: 2017-11-08
Payer: COMMERCIAL

## 2017-11-08 DIAGNOSIS — E11.9 TYPE 2 DIABETES MELLITUS WITHOUT COMPLICATION, WITHOUT LONG-TERM CURRENT USE OF INSULIN (H): Primary | ICD-10-CM

## 2017-11-08 PROCEDURE — G0108 DIAB MANAGE TRN  PER INDIV: HCPCS | Performed by: DIETITIAN, REGISTERED

## 2017-11-08 RX ORDER — DAPAGLIFLOZIN 5 MG/1
5 TABLET, FILM COATED ORAL DAILY
Qty: 30 TABLET | Refills: 1 | Status: SHIPPED | OUTPATIENT
Start: 2017-11-08 | End: 2018-03-02

## 2017-11-08 NOTE — MR AVS SNAPSHOT
"              After Visit Summary   11/8/2017    May Wade    MRN: [id number]           Patient Information     Date Of Birth          1970        Visit Information        Provider Department      11/8/2017 3:00 PM Julia Parks RD [facility]        Care Instructions    1.  Start taking Trulicity 1.5 mg once weekly.      2. Start taking the Farxiga 5 mg daily  (two week after taking the Trulicity)    Get savings cards for these, if they are having a problem getting it through you need to call.    Email your blood sugars every two weeks till we get you stable           Follow-ups after your visit        Who to contact     If you have questions or need follow up information about today's clinic visit or your schedule please contact [facility] directly at [phone].  Normal or non-critical lab and imaging results will be communicated to you by Penemarie K Murphyhart, letter or phone within 4 business days after the clinic has received the results. If you do not hear from us within 7 days, please contact the clinic through Penemarie K Murphyhart or phone. If you have a critical or abnormal lab result, we will notify you by phone as soon as possible.  Submit refill requests through [facility] or call your pharmacy and they will forward the refill request to us. Please allow 3 business days for your refill to be completed.          Additional Information About Your Visit        MyChart Information     [facility] lets you send messages to your doctor, view your test results, renew your prescriptions, schedule appointments and more. To sign up, go to [email or web address] . Click on \"Log in\" on the left side of the screen, which will take you to the Welcome page. Then click on \"Sign up Now\" on the right side of the page.     You will be asked to enter the access code listed below, as well as some personal information. Please follow the directions to create your username and password.     Your access " code is: [email or web address]  Expires: 2018  3:49 PM     Your access code will  in 90 days. If you need help or a new code, please call your [place] clinic or [phone].        Care EveryWhere ID     This is your Care EveryWhere ID. This could be used by other organizations to access your [place] medical records  [id number]         Blood Pressure from Last 3 Encounters:   17 124/76   03/10/17 130/89   10/17/16 108/52    Weight from Last 3 Encounters:   17 66.8 kg (147 lb 3.2 oz)   03/10/17 67.1 kg (148 lb)   10/17/16 67.1 kg (148 lb)              Today, you had the following     No orders found for display         Today's Medication Changes          These changes are accurate as of: 17  3:49 PM.  If you have any questions, ask your nurse or doctor.               Start taking these medicines.        Dose/Directions    dapagliflozin 5 MG Tabs tablet   Commonly known as:  FARXIGA   Used for:  Type 2 diabetes mellitus without complication, without long-term current use of insulin (H)   Started by:  Julia Parks RD        Dose:  5 mg   Take 1 tablet (5 mg) by mouth daily   Quantity:  30 tablet   Refills:  1       dulaglutide 1.5 MG/0.5ML pen   Commonly known as:  TRULICITY   Used for:  Type 2 diabetes mellitus without complication, without long-term current use of insulin (H)   Started by:  Julia Parks RD        Dose:  1.5 mg   Inject 1.5 mg Subcutaneous every 7 days   Quantity:  2 mL   Refills:  5            Where to get your medicines      These medications were sent to [facility], [place] - [address]  [address] [address] [facility] [id number]     Phone:  [phone]     dapagliflozin 5 MG Tabs tablet    dulaglutide 1.5 MG/0.5ML pen                Primary Care Provider Office Phone # Fax #    Julia Reyna [phone] [address]  [facility] [address]        Equal Access to Services     AGGIE MALIK AH:  Hadii regina mcneill Soorestesali, waaxda luqadaha, qaybta kaalmada vicky, avery mikelin hayaaclotilde jerezmelvin bullock laelviraclotilde tyler. So [facility] [phone].    ATENCIÓN: Si habla nelson, tiene a bustillo disposición servicios gratuitos de asistencia lingüística. Llame al [phone].    We comply with applicable federal civil rights laws and [place] laws. We do not discriminate on the basis of race, color, national origin, age, disability, sex, sexual orientation, or gender identity.            Thank you!     Thank you for choosing [facility]  for your care. Our goal is always to provide you with excellent care. Hearing back from our patients is one way we can continue to improve our services. Please take a few minutes to complete the written survey that you may receive in the mail after your visit with us. Thank you!             Your Updated Medication List - Protect others around you: Learn how to safely use, store and throw away your medicines at www.disposemymeds.org.          This list is accurate as of: 11/8/17  3:49 PM.  Always use your most recent med list.                   Brand Name Dispense Instructions for use Diagnosis    blood glucose monitoring lancets     1 Box    Use to test blood sugar 2 times daily or as directed.  Ok to substitute alternative if insurance prefers.    Type 2 diabetes mellitus without complication, without long-term current use of insulin (H)       * blood glucose monitoring meter device kit     1 kit    Use to test blood sugars 2 times daily or as directed.    Type 2 diabetes, HbA1C goal < 8% (H)       * blood glucose monitoring meter device kit     1 kit    Use to test blood sugars 4 daily as directed.    Uncomplicated type 2 diabetes mellitus (H)       * blood glucose monitoring test strip    no brand specified    1 Box    1 strip by In Vitro route 2 times daily **now using Yunier Contour Next**    Uncomplicated type 2 diabetes mellitus (H)       * blood glucose monitoring test  strip    ACCU-CHEK SANDRA PLUS    200 strip    Use to test blood sugar 2 times daily or as directed.  Ok to substitute alternative if insurance prefers.    Type 2 diabetes mellitus without complication, without long-term current use of insulin (H)       dapagliflozin 5 MG Tabs tablet    FARXIGA    30 tablet    Take 1 tablet (5 mg) by mouth daily    Type 2 diabetes mellitus without complication, without long-term current use of insulin (H)       dulaglutide 1.5 MG/0.5ML pen    TRULICITY    2 mL    Inject 1.5 mg Subcutaneous every 7 days    Type 2 diabetes mellitus without complication, without long-term current use of insulin (H)       fish oil-omega-3 fatty acids 1000 MG capsule     360 capsule    Take 2 capsules by mouth 2 times daily.    Hypertriglyceridemia       * insulin pen needle 31G X 5 MM     1 Box    Use with lantus at bedtime    Type 2 diabetes, HbA1C goal < 8% (H)       * insulin pen needle 32G X 4 MM    BD YULIYA U/F    100 each    Use 1 daily as directed.    Type 2 diabetes mellitus without complication, without long-term current use of insulin (H)       losartan 25 MG tablet    COZAAR    30 tablet    TAKE ONE TABLET BY MOUTH EVERY DAY    Essential hypertension with goal blood pressure less than 140/90       metFORMIN 500 MG 24 hr tablet    GLUCOPHAGE-XR    120 tablet    TAKE TWO TABLETS BY MOUTH TWICE A DAY WITH MEALS (NEEDS LAB WORK)    Type 2 diabetes mellitus without complication, without long-term current use of insulin (H)       omeprazole 20 MG CR capsule    priLOSEC    90 capsule    TAKE ONE CAPSULE BY MOUTH DAILY. TAKE 30 TO 60 MINUTES BEFORE A MEAL    Cough       simvastatin 40 MG tablet    ZOCOR    30 tablet    TAKE ONE TABLET BY MOUTH AT BEDTIME    Hyperlipidemia LDL goal <100       TANZEUM 50 MG pen   Generic drug:  albiglutide     4 each    INJECT 50 MG SUBCUTANEOUSLY ONCE WEEKLY    Type 2 diabetes mellitus without complication, without long-term current use of insulin (H)       * Notice:   This list has 6 medication(s) that are the same as other medications prescribed for you. Read the directions carefully, and ask your doctor or other care provider to review them with you.

## 2017-11-08 NOTE — TELEPHONE ENCOUNTER
Dr. Reyna  May's a1c is 8.6%.  Need to make some changes.  She is also getting a rash from the Tanzeum.  Suggest switching over to trulicity 1.5 mg dose since she has been on high dose of tanzeum.  Also she will need to add another agent due to elevated a1c.  Suggest SGLT2 farxiga 5 mg.  eGFR 3/17 was >90.      So plan would be to start Trulicity 1.5 mg once weekly for two weeks (to assess tolerance)  IN two weeks start the Farxiga 5 mg    Are you ok with this plan? Thanks! Julia Parks RD, CDE

## 2017-11-08 NOTE — TELEPHONE ENCOUNTER
I agree with the plan below as laid out by Julia Parks RD, CDE.    Thanks for your help with this.    Julia Reyna M.D.

## 2017-11-08 NOTE — PATIENT INSTRUCTIONS
1.  Start taking Trulicity 1.5 mg once weekly.      2. Start taking the Farxiga 5 mg daily  (two week after taking the Trulicity)    Get savings cards for these, if they are having a problem getting it through you need to call.    Email your blood sugars every two weeks till we get you stable

## 2017-11-08 NOTE — PROGRESS NOTES
Diabetes Self Management Training: Individual Review Visit    May Wade presents today for education related to Type 2 diabetes.    She is accompanied by self    Patient's diabetes management related comments/concerns: a1c is high    Patient's emotional response to diabetes: expresses readiness to learn    Patient would like this visit to be focused around the following diabetes-related behaviors and goals: Healthy Eating, Being Active, Monitoring and Taking Medication    ASSESSMENT:  Patient Problem List and Family Medical History reviewed for relevant medical history, current medical status, and diabetes risk factors.    Current Diabetes Management per Patient:  Taking diabetes medications?   yes:     Diabetes Medication(s)     Biguanides Sig    metFORMIN (GLUCOPHAGE-XR) 500 MG 24 hr tablet TAKE TWO TABLETS BY MOUTH TWICE A DAY WITH MEALS (NEEDS LAB WORK)    Incretin Mimetic Agents (GLP-1 Receptor Agonists) Sig    TANZEUM 50 MG pen INJECT 50 MG SUBCUTANEOUSLY ONCE WEEKLY          *Abbreviated insulin dose documentation key: Insulin Trade Name ([id number]-uoxwcn-bedtime) - i.e. Humalog 5-5-5-0 (Humalog 5 units at breakfast, 5 units at lunch, and 5 units at dinner).    Past Diabetes Education: Yes    Patient glucose self monitoring as follows: two times daily.     BG results: am: 145ish, during the day 160's       BG values are: Not in goal  Patient's most recent   Lab Results   Component Value Date    A1C 8.6 10/20/2017    is not meeting goal of <7.0    Nutrition:  Breakfast- yogurt or english muffin, bagel or skip occasionally  Lunch- leftovers, soup, 1/2 sandwich on wheat, if traveling she eats what she gets  Snack- apple and granola bar  Supper- 1/2 pork chop and 1 cup potatoes, krystyna choices with less rice or tacos or split dinner out with hubby  Snacks- not so much evening usually in day  Physical Activity:    Limitations: none  Skating for job, works long hours    Diabetes Risk Factors:  family  "history and overweight/obesity    Diabetes Complications:  Not discussed today.    Vitals:  There were no vitals taken for this visit.  Estimated body mass index is 29.73 kg/(m^2) as calculated from the following:    Height as of 6/27/17: 1.499 m (4' 11\").    Weight as of 6/27/17: 66.8 kg (147 lb 3.2 oz).   Last 3 BP:   BP Readings from Last 3 Encounters:   06/27/17 124/76   03/10/17 130/89   10/17/16 108/52       History   Smoking Status     Passive Smoke Exposure - Never Smoker   Smokeless Tobacco     Never Used       Labs:  Lab Results   Component Value Date    A1C 8.6 10/20/2017     Lab Results   Component Value Date     03/10/2017     Lab Results   Component Value Date    LDL 56 10/17/2016    LDL 43 02/13/2014     HDL Cholesterol   Date Value Ref Range Status   02/13/2014 29 (L) 50 - 110 mg/dL Final   ]  GFR Estimate   Date Value Ref Range Status   03/10/2017 >90  Non  GFR Calc   >60 mL/min/1.7m2 Final     GFR Estimate If Black   Date Value Ref Range Status   03/10/2017 >90   GFR Calc   >60 mL/min/1.7m2 Final     Lab Results   Component Value Date    CR 0.58 03/10/2017     No results found for: MICROALBUMIN    Socio/Economic Considerations:    Support system: family    Health Beliefs and Attitudes:   Patient Activation Measure Survey Score:  SHUBHAM Score (Last Two) 12/28/2010   SHUBHAM Raw Score 52   Activation Score 100   SHUBHAM Level 4       Stage of Change: ACTION (Actively working towards change)      Diabetes knowledge and skills assessment:     Patient is knowledgeable in diabetes management concepts related to: Healthy Eating, Being Active, Monitoring, Taking Medication and Problem Solving    Patient needs further education on the following diabetes management concepts: Healthy Eating, Being Active, Monitoring, Taking Medication, Problem Solving, Reducing Risks and Healthy Coping    Barriers to Learning Assessment: No Barriers identified    Based on learning assessment above, " most appropriate setting for further diabetes education would be: Individual setting.    INTERVENTION:   Issues with coverage of tanzeum and getting rash from it, switching to trulicity 1.5 mg weekly and farxiga 5 mg daily.  a1c 8.6% on full dose of GLP1 which is why farxiga added to plan  Meal plan still good and stays active with job  Education provided today on:  AADE Self-Care Behaviors:  Healthy Eating: consistency in amount, composition, and timing of food intake  Being Active: relationship to blood glucose and describe appropriate activity program  Taking Medication: action of prescribed medication, drawing up, administering and storing injectable diabetes medications, proper site selection and rotation for injections, side effects of prescribed medications and when to take medications    Opportunities for ongoing education and support in diabetes-self management were discussed.    Pt verbalized understanding of concepts discussed and recommendations provided today.       Education Materials Provided:  Medication Information on farxiga  Has been on trulicity before    PLAN:  See Patient Instructions for co-developed, patient-stated behavior change goals.  AVS printed and provided to patient today.    FOLLOW-UP:    Follow-up planned for BG review by phone/e-mail/[facility].     Ongoing plan for education and support: Follow-up visit with diabetes educator in two weeks via email      Time Spent: 60 minutes  Encounter Type: Individual    Any diabetes medication dose changes were made via the CDE Protocol and Collaborative Practice Agreement with the patient's primary care provider. A copy of this encounter was shared with the provider.

## 2017-11-09 ENCOUNTER — TELEPHONE (OUTPATIENT)
Dept: FAMILY MEDICINE | Facility: CLINIC | Age: 47
End: 2017-11-09

## 2017-11-10 NOTE — TELEPHONE ENCOUNTER
Please do not close this encounter until this has been addressed.  (prior auth approved/denied, prescriber refusal to complete prior auth or medication changed/discontinued)    Prior Authorization needed on: FARXIGA 5MG  Drug NDC: [address]-42     Insurance: [facility]  Member ID: [id number]   Insurance phone #: [phone]    Pharmacy NPI: [id number]  Pharmacy Phone #: [phone]  Pharmacy Fax #: [phone]    Please let us know if the PA gets approved or denied or if medication is changed.     Imelda Black Pharmacy Technician  [facility]

## 2017-11-13 DIAGNOSIS — E11.9 TYPE 2 DIABETES MELLITUS WITHOUT COMPLICATION, WITHOUT LONG-TERM CURRENT USE OF INSULIN (H): ICD-10-CM

## 2017-11-13 NOTE — TELEPHONE ENCOUNTER
A PA has been started at cover my med for Farxiga.  However I believe Cover My Med is having computer problems and I was not able to complete PA.  Will attempt to complete tomorrow.     KEY [id number]

## 2017-11-14 NOTE — TELEPHONE ENCOUNTER
PA for Farxiga has been denied.  Patient needs to try Invokana, Invokamet, and Jardiance.  I believe each of these medications will require PA as well.

## 2017-11-16 RX ORDER — METFORMIN HCL 500 MG
1000 TABLET, EXTENDED RELEASE 24 HR ORAL 2 TIMES DAILY WITH MEALS
Qty: 360 TABLET | Refills: 0 | Status: SHIPPED | OUTPATIENT
Start: 2017-11-16 | End: 2018-02-07

## 2017-11-16 NOTE — TELEPHONE ENCOUNTER
Pt was given a savings card that she should be able to be used regardless of insurance.  I will check with the pharmacy and see if pt has been in with the card yet. Julia Parks RD, CDE

## 2017-11-17 NOTE — TELEPHONE ENCOUNTER
Yes, that is how they work, usually about 1-2 years.  She was able to  medication with no copay.  Julia Parks RD, CDE

## 2017-12-12 DIAGNOSIS — I10 ESSENTIAL HYPERTENSION WITH GOAL BLOOD PRESSURE LESS THAN 140/90: ICD-10-CM

## 2017-12-12 DIAGNOSIS — E78.5 HYPERLIPIDEMIA LDL GOAL <100: ICD-10-CM

## 2017-12-15 RX ORDER — LOSARTAN POTASSIUM 25 MG/1
25 TABLET ORAL DAILY
Qty: 90 TABLET | Refills: 0 | Status: SHIPPED | OUTPATIENT
Start: 2017-12-15 | End: 2018-02-07

## 2017-12-15 RX ORDER — SIMVASTATIN 40 MG
40 TABLET ORAL AT BEDTIME
Qty: 90 TABLET | Refills: 0 | Status: SHIPPED | OUTPATIENT
Start: 2017-12-15 | End: 2018-02-07

## 2017-12-15 NOTE — TELEPHONE ENCOUNTER
Prescription approved per [facility] Refill Protocol.  Reminder given labs DUE March 2018. KpavelRN

## 2017-12-27 DIAGNOSIS — E11.9 TYPE 2 DIABETES MELLITUS WITHOUT COMPLICATION, WITHOUT LONG-TERM CURRENT USE OF INSULIN (H): ICD-10-CM

## 2017-12-27 NOTE — TELEPHONE ENCOUNTER
TANZEUM 50 MG pen     Last Written Prescription Date:  06/01/2017  Last Fill Quantity: 4,   # refills: 3  Last Office Visit: 06/27/2017  Future Office visit:       Routing refill request to provider for review/approval because:  Drug not on the [facility], [facility] or [facility] refill protocol or controlled substance

## 2017-12-29 NOTE — TELEPHONE ENCOUNTER
Prescription approved per [facility] Refill Protocol.  Pt given 1 month refill to cover until appt.  Pt to f/u per lab result note.  KpavelRN

## 2018-01-25 ENCOUNTER — TELEPHONE (OUTPATIENT)
Dept: FAMILY MEDICINE | Facility: CLINIC | Age: 48
End: 2018-01-25

## 2018-01-25 DIAGNOSIS — E78.5 HYPERLIPIDEMIA LDL GOAL <100: ICD-10-CM

## 2018-01-25 DIAGNOSIS — E11.9 TYPE 2 DIABETES MELLITUS WITHOUT COMPLICATION, WITHOUT LONG-TERM CURRENT USE OF INSULIN (H): ICD-10-CM

## 2018-01-25 DIAGNOSIS — I10 HTN, GOAL BELOW 140/90: Primary | ICD-10-CM

## 2018-01-25 NOTE — TELEPHONE ENCOUNTER
Met in consultation for:  Diabetes Mellitus    Parameters Addressed:  A1C    Recommended follow-up:  Lab only appt    Considerations:  Future Labs - due for a1c   Also recommend visit with me, has been >6 months since last diabetic visit. Due also for lipids, urine microalbumin, thyroid and kidney function.       Julia Reyna M.D.

## 2018-02-05 NOTE — TELEPHONE ENCOUNTER
Patient was notified. Patient will call to schedule lab only apppt. Patient feels like see just seen diabetic ed and would like to hold off on appt for awhile.     Blanche Ho MA

## 2018-02-06 DIAGNOSIS — E78.5 HYPERLIPIDEMIA LDL GOAL <100: ICD-10-CM

## 2018-02-06 DIAGNOSIS — E11.9 TYPE 2 DIABETES MELLITUS WITHOUT COMPLICATION, WITHOUT LONG-TERM CURRENT USE OF INSULIN (H): ICD-10-CM

## 2018-02-06 DIAGNOSIS — I10 HTN, GOAL BELOW 140/90: ICD-10-CM

## 2018-02-06 LAB
ANION GAP SERPL CALCULATED.3IONS-SCNC: 5 MMOL/L (ref 3–14)
BUN SERPL-MCNC: 7 MG/DL (ref 7–30)
CALCIUM SERPL-MCNC: 9.1 MG/DL (ref 8.5–10.1)
CHLORIDE SERPL-SCNC: 101 MMOL/L (ref 94–109)
CHOLEST SERPL-MCNC: 119 MG/DL
CO2 SERPL-SCNC: 30 MMOL/L (ref 20–32)
CREAT SERPL-MCNC: 0.58 MG/DL (ref 0.52–1.04)
CREAT UR-MCNC: 276 MG/DL
GFR SERPL CREATININE-BSD FRML MDRD: >90 ML/MIN/1.7M2
GLUCOSE SERPL-MCNC: 174 MG/DL (ref 70–99)
HBA1C MFR BLD: 9.7 % (ref 4.3–6)
HDLC SERPL-MCNC: 39 MG/DL
LDLC SERPL CALC-MCNC: 33 MG/DL
MICROALBUMIN UR-MCNC: 106 MG/L
MICROALBUMIN/CREAT UR: 38.41 MG/G CR (ref 0–25)
NONHDLC SERPL-MCNC: 80 MG/DL
POTASSIUM SERPL-SCNC: 3.8 MMOL/L (ref 3.4–5.3)
SODIUM SERPL-SCNC: 136 MMOL/L (ref 133–144)
TRIGL SERPL-MCNC: 237 MG/DL
TSH SERPL DL<=0.005 MIU/L-ACNC: 2.98 MU/L (ref 0.4–4)

## 2018-02-06 PROCEDURE — 80048 BASIC METABOLIC PNL TOTAL CA: CPT | Performed by: FAMILY MEDICINE

## 2018-02-06 PROCEDURE — 80061 LIPID PANEL: CPT | Performed by: FAMILY MEDICINE

## 2018-02-06 PROCEDURE — 82043 UR ALBUMIN QUANTITATIVE: CPT | Performed by: FAMILY MEDICINE

## 2018-02-06 PROCEDURE — 83036 HEMOGLOBIN GLYCOSYLATED A1C: CPT | Performed by: FAMILY MEDICINE

## 2018-02-06 PROCEDURE — 84443 ASSAY THYROID STIM HORMONE: CPT | Performed by: FAMILY MEDICINE

## 2018-02-06 PROCEDURE — 36415 COLL VENOUS BLD VENIPUNCTURE: CPT | Performed by: FAMILY MEDICINE

## 2018-02-07 ENCOUNTER — OFFICE VISIT (OUTPATIENT)
Dept: FAMILY MEDICINE | Facility: CLINIC | Age: 48
End: 2018-02-07
Payer: COMMERCIAL

## 2018-02-07 VITALS
DIASTOLIC BLOOD PRESSURE: 80 MMHG | TEMPERATURE: 98.1 F | BODY MASS INDEX: 29.64 KG/M2 | RESPIRATION RATE: 18 BRPM | WEIGHT: 147 LBS | SYSTOLIC BLOOD PRESSURE: 133 MMHG | HEART RATE: 107 BPM | HEIGHT: 59 IN

## 2018-02-07 DIAGNOSIS — E11.29 TYPE 2 DIABETES MELLITUS WITH MICROALBUMINURIA, WITHOUT LONG-TERM CURRENT USE OF INSULIN (H): Primary | ICD-10-CM

## 2018-02-07 DIAGNOSIS — R05.9 COUGH: ICD-10-CM

## 2018-02-07 DIAGNOSIS — R80.9 TYPE 2 DIABETES MELLITUS WITH MICROALBUMINURIA, WITHOUT LONG-TERM CURRENT USE OF INSULIN (H): Primary | ICD-10-CM

## 2018-02-07 DIAGNOSIS — E78.5 HYPERLIPIDEMIA LDL GOAL <100: ICD-10-CM

## 2018-02-07 DIAGNOSIS — I10 ESSENTIAL HYPERTENSION WITH GOAL BLOOD PRESSURE LESS THAN 140/90: ICD-10-CM

## 2018-02-07 PROCEDURE — 99214 OFFICE O/P EST MOD 30 MIN: CPT | Performed by: FAMILY MEDICINE

## 2018-02-07 RX ORDER — METFORMIN HCL 500 MG
1000 TABLET, EXTENDED RELEASE 24 HR ORAL 2 TIMES DAILY WITH MEALS
Qty: 360 TABLET | Refills: 1 | Status: SHIPPED | OUTPATIENT
Start: 2018-02-07 | End: 2018-09-05

## 2018-02-07 RX ORDER — SIMVASTATIN 40 MG
40 TABLET ORAL AT BEDTIME
Qty: 90 TABLET | Refills: 3 | Status: SHIPPED | OUTPATIENT
Start: 2018-02-07 | End: 2019-01-21

## 2018-02-07 RX ORDER — LOSARTAN POTASSIUM 25 MG/1
25 TABLET ORAL DAILY
Qty: 90 TABLET | Refills: 1 | Status: SHIPPED | OUTPATIENT
Start: 2018-02-07 | End: 2018-09-05

## 2018-02-07 ASSESSMENT — PAIN SCALES - GENERAL: PAINLEVEL: NO PAIN (0)

## 2018-02-07 NOTE — MR AVS SNAPSHOT
After Visit Summary   2/7/2018    May Wade    MRN: [id number]           Patient Information     Date Of Birth          1970        Visit Information        Provider Department      2/7/2018 2:20 PM Julia Reyna MD [facility]        Today's Diagnoses     Type 2 diabetes mellitus without complication, without long-term current use of insulin (H)    -  1    Hyperlipidemia LDL goal <100        Essential hypertension with goal blood pressure less than 140/90        Cough          Care Instructions    Dietdoctor.Digital [place]        Thank you for choosing [facility].  You may be receiving a survey in the mail from [employer] regarding your visit today.  Please take a few minutes to complete and return the survey to let us know how we are doing.      Our Clinic hours are:  Mondays    7:20 am - 7 pm  Tues -  Fri  7:20 am - 5 pm    Clinic Phone: [phone]    The clinic lab opens at 7:30 am Mon - Fri and appointments are required.    [facility], [place]  Ph. [phone]  Monday-Thursday 8 am - 7pm  Tues/Wed/Fri 8 am - 5:30 pm                   Follow-ups after your visit        Who to contact     If you have questions or need follow up information about today's clinic visit or your schedule please contact [facility] directly at [phone].  Normal or non-critical lab and imaging results will be communicated to you by MyChart, letter or phone within 4 business days after the clinic has received the results. If you do not hear from us within 7 days, please contact the clinic through MyChart or phone. If you have a critical or abnormal lab result, we will notify you by phone as soon as possible.  Submit refill requests through Kingmaker or call your pharmacy and they will forward the refill request to us. Please allow 3 business days for your refill to be completed.          Additional Information About Your Visit        MyChart Information      "Trustev lets you send messages to your doctor, view your test results, renew your prescriptions, schedule appointments and more. To sign up, go to [email or web address] . Click on \"Log in\" on the left side of the screen, which will take you to the Welcome page. Then click on \"Sign up Now\" on the right side of the page.     You will be asked to enter the access code listed below, as well as some personal information. Please follow the directions to create your username and password.     Your access code is: [email or web address]  Expires: 2018  2:56 PM     Your access code will  in 90 days. If you need help or a new code, please call your [place] clinic or [phone].        Care EveryWhere ID     This is your Care EveryWhere ID. This could be used by other organizations to access your [place] medical records  [id number]        Your Vitals Were     Pulse Temperature Respirations Height Breastfeeding? BMI (Body Mass Index)    107 98.1  F (36.7  C) (Tympanic) 18 4' 11\" (1.499 m) No 29.69 kg/m2       Blood Pressure from Last 3 Encounters:   18 133/80   17 124/76   03/10/17 130/89    Weight from Last 3 Encounters:   18 147 lb (66.7 kg)   17 147 lb 3.2 oz (66.8 kg)   03/10/17 148 lb (67.1 kg)              Today, you had the following     No orders found for display         Today's Medication Changes          These changes are accurate as of 18  2:56 PM.  If you have any questions, ask your nurse or doctor.               These medicines have changed or have updated prescriptions.        Dose/Directions    losartan 25 MG tablet   Commonly known as:  COZAAR   This may have changed:  additional instructions   Used for:  Essential hypertension with goal blood pressure less than 140/90   Changed by:  Julia Reyna MD        Dose:  25 mg   Take 1 tablet (25 mg) by mouth daily   Quantity:  90 tablet   Refills:  1       omeprazole 20 MG CR capsule   Commonly known as:  priLOSEC   This may " have changed:  See the new instructions.   Used for:  Cough   Changed by:  Julia Reyna MD        TAKE ONE CAPSULE BY MOUTH DAILY. TAKE 30 TO 60 MINUTES BEFORE A MEAL   Quantity:  90 capsule   Refills:  3       simvastatin 40 MG tablet   Commonly known as:  ZOCOR   This may have changed:  additional instructions   Used for:  Hyperlipidemia LDL goal <100   Changed by:  Julia Reyna MD        Dose:  40 mg   Take 1 tablet (40 mg) by mouth At Bedtime   Quantity:  90 tablet   Refills:  3         Stop taking these medicines if you haven't already. Please contact your care team if you have questions.     albiglutide 50 MG pen   Commonly known as:  TANZEUM   Stopped by:  Julia Reyna MD           blood glucose monitoring lancets   Stopped by:  Julia Reyna MD           insulin pen needle 31G X 5 MM   Stopped by:  Julia Reyna MD           insulin pen needle 32G X 4 MM   Commonly known as:  BD YULIYA U/F   Stopped by:  Julia Reyna MD                Where to get your medicines      [place] [facility] [address]  [address] [facility] [address]     Phone:  [phone]     losartan 25 MG tablet    metFORMIN 500 MG 24 hr tablet    omeprazole 20 MG CR capsule    simvastatin 40 MG tablet                Primary Care Provider Office Phone # Fax #    Julia Reyna [phone] [address] [facility] [address]        Equal Access to Services     [facility]: Hadii aad ku hadasho Soomaali, waaxda luqadaha, qaybta kaalmada adeegyada, waxay idiin hayaan neil chavez . So [facility] [phone].    ATENCIÓN: Si habla español, tiene a bustillo disposición servicios gratuitos de asistencia lingüística. Llame al [phone].    We comply with applicable federal civil rights laws and [place] laws. We do not discriminate on the basis of race, color, national origin, age, disability, sex,  sexual orientation, or gender identity.            Thank you!     Thank you for choosing [facility]  for your care. Our goal is always to provide you with excellent care. Hearing back from our patients is one way we can continue to improve our services. Please take a few minutes to complete the written survey that you may receive in the mail after your visit with us. Thank you!             Your Updated Medication List - Protect others around you: Learn how to safely use, store and throw away your medicines at www.disposemymeds.org.          This list is accurate as of 2/7/18  2:56 PM.  Always use your most recent med list.                   Brand Name Dispense Instructions for use Diagnosis    * blood glucose monitoring meter device kit     1 kit    Use to test blood sugars 2 times daily or as directed.    Type 2 diabetes, HbA1C goal < 8% (H)       * blood glucose monitoring meter device kit     1 kit    Use to test blood sugars 4 daily as directed.    Uncomplicated type 2 diabetes mellitus (H)       * blood glucose monitoring test strip    no brand specified    1 Box    1 strip by In Vitro route 2 times daily **now using Yunier Contour Next**    Uncomplicated type 2 diabetes mellitus (H)       * blood glucose monitoring test strip    ACCU-CHEK SANDRA PLUS    200 strip    Use to test blood sugar 2 times daily or as directed.  Ok to substitute alternative if insurance prefers.    Type 2 diabetes mellitus without complication, without long-term current use of insulin (H)       dapagliflozin 5 MG Tabs tablet    FARXIGA    30 tablet    Take 1 tablet (5 mg) by mouth daily    Type 2 diabetes mellitus without complication, without long-term current use of insulin (H)       dulaglutide 1.5 MG/0.5ML pen    TRULICITY    2 mL    Inject 1.5 mg Subcutaneous every 7 days    Type 2 diabetes mellitus without complication, without long-term current use of insulin (H)       fish oil-omega-3 fatty acids 1000 MG  capsule     360 capsule    Take 2 capsules by mouth 2 times daily.    Hypertriglyceridemia       losartan 25 MG tablet    COZAAR    90 tablet    Take 1 tablet (25 mg) by mouth daily    Essential hypertension with goal blood pressure less than 140/90       metFORMIN 500 MG 24 hr tablet    GLUCOPHAGE-XR    360 tablet    Take 2 tablets (1,000 mg) by mouth 2 times daily (with meals)    Type 2 diabetes mellitus without complication, without long-term current use of insulin (H)       omeprazole 20 MG CR capsule    priLOSEC    90 capsule    TAKE ONE CAPSULE BY MOUTH DAILY. TAKE 30 TO 60 MINUTES BEFORE A MEAL    Cough       simvastatin 40 MG tablet    ZOCOR    90 tablet    Take 1 tablet (40 mg) by mouth At Bedtime    Hyperlipidemia LDL goal <100       * Notice:  This list has 4 medication(s) that are the same as other medications prescribed for you. Read the directions carefully, and ask your doctor or other care provider to review them with you.

## 2018-02-07 NOTE — PROGRESS NOTES
HgbA1c higher at 9.7%.  Needs visit to be seen by me.  More protein in the urine than before, this indicates diabetes is starting to affect the kidneys.       Julia Reyna M.D.

## 2018-02-07 NOTE — PATIENT INSTRUCTIONS
[place]        Thank you for choosing [facility].  You may be receiving a survey in the mail from [employer] regarding your visit today.  Please take a few minutes to complete and return the survey to let us know how we are doing.      Our Clinic hours are:  Mondays    7:20 am - 7 pm  Tues -  Fri  7:20 am - 5 pm    Clinic Phone: [phone]    The clinic lab opens at 7:30 am Mon - Fri and appointments are required.    [facility]. [phone]  Monday-Thursday 8 am - 7pm  Tues/Wed/Fri 8 am - 5:30 pm

## 2018-02-07 NOTE — NURSING NOTE
"Chief Complaint   Patient presents with     Diabetes     Patient has noticed changes in her eyes and is concerned with her blood sugar.      Derm Problem     Patient noticed that after starting trulicity. Patient has rash on face. Patient will notice it itching, painful and peeling and very dry.        Initial /88  Pulse 107  Temp 98.1  F (36.7  C) (Tympanic)  Resp 18  Ht 4' 11\" (1.499 m)  Wt 147 lb (66.7 kg)  Breastfeeding? No  BMI 29.69 kg/m2 Estimated body mass index is 29.69 kg/(m^2) as calculated from the following:    Height as of this encounter: 4' 11\" (1.499 m).    Weight as of this encounter: 147 lb (66.7 kg).  Medication Reconciliation: complete    "

## 2018-02-07 NOTE — PROGRESS NOTES
SUBJECTIVE:   May Wade is a 47 year old female who presents to clinic today for the following health issues:  Chief Complaint   Patient presents with     Diabetes     Patient has noticed changes in her eyes and is concerned with her blood sugar.      Derm Problem     Patient noticed that after starting trulicity. Patient has rash on face. Patient will notice it itching, painful and peeling and very dry.          Diabetes Follow-up    Patient is checking blood sugars: once daily.  Results are as follows:         am - 140    Diabetic concerns: None     Symptoms of hypoglycemia (low blood sugar): none     Paresthesias (numbness or burning in feet) or sores: No     Date of last diabetic eye exam: DUE    Tanzeum - caused a rash on her legs and insurance didn't cover it      Changed to Trulicity - feels like she developed a face rash - now using CeraVe on her face    Did not start the Farxiga because she was afraid she would get lightheaded if she wasn't drinking 6-7 glasses of water a day.      Diet: yogurt in the morning, 1/2 naan bread, hummus  Lunch: celery, PB and 1/2 naan bread  Dinner: taco    Hyperlipidemia Follow-Up      Rate your low fat/cholesterol diet?: good    Taking statin?  Yes, no muscle aches from statin    Other lipid medications/supplements?:  none    Hypertension Follow-up      Outpatient blood pressures are not being checked.    Low Salt Diet: no added salt    BP Readings from Last 2 Encounters:   02/07/18 133/80   06/27/17 124/76     Hemoglobin A1C (%)   Date Value   02/06/2018 9.7 (H)   10/20/2017 8.6 (H)     LDL Cholesterol Calculated (mg/dL)   Date Value   02/06/2018 33   02/13/2014 43     LDL Cholesterol Direct (mg/dL)   Date Value   10/17/2016 56   10/19/2015 67       Amount of exercise or physical activity: 2-3 days/week for an average of 15-30 minutes    Problems taking medications regularly: No    Medication side effects: none    Diet: regular (no restrictions)        ROS: 5 point  "ROS negative except as noted above in HPI, including Gen., Resp., CV, GI &  system review.    /80  Pulse 107  Temp 98.1  F (36.7  C) (Tympanic)  Resp 18  Ht 4' 11\" (1.499 m)  Wt 147 lb (66.7 kg)  Breastfeeding? No  BMI 29.69 kg/m2  EXAM: GENERAL APPEARANCE: Alert, no acute distress  RESP: lungs clear to auscultation   CV: normal rate, regular rhythm, no murmur or gallop  ABDOMEN: soft, no organomegaly, masses or tenderness  SKIN: dry skin on her cheeks.  Does not appear to be perioral dermatitis.     Results for orders placed or performed in visit on 02/06/18   Hemoglobin A1c   Result Value Ref Range    Hemoglobin A1C 9.7 (H) 4.3 - 6.0 %   Basic metabolic panel   Result Value Ref Range    Sodium 136 133 - 144 mmol/L    Potassium 3.8 3.4 - 5.3 mmol/L    Chloride 101 94 - 109 mmol/L    Carbon Dioxide 30 20 - 32 mmol/L    Anion Gap 5 3 - 14 mmol/L    Glucose 174 (H) 70 - 99 mg/dL    Urea Nitrogen 7 7 - 30 mg/dL    Creatinine 0.58 0.52 - 1.04 mg/dL    GFR Estimate >90 >60 mL/min/1.7m2    GFR Estimate If Black >90 >60 mL/min/1.7m2    Calcium 9.1 8.5 - 10.1 mg/dL   Lipid panel reflex to direct LDL Fasting   Result Value Ref Range    Cholesterol 119 <200 mg/dL    Triglycerides 237 (H) <150 mg/dL    HDL Cholesterol 39 (L) >49 mg/dL    LDL Cholesterol Calculated 33 <100 mg/dL    Non HDL Cholesterol 80 <130 mg/dL   TSH with free T4 reflex   Result Value Ref Range    TSH 2.98 0.40 - 4.00 mU/L   Albumin Random Urine Quantitative with Creat Ratio   Result Value Ref Range    Creatinine Urine 276 mg/dL    Albumin Urine mg/L 106 mg/L    Albumin Urine mg/g Cr 38.41 (H) 0 - 25 mg/g Cr     ASSESSMENT/PLAN:      ICD-10-CM    1. Type 2 diabetes mellitus with microalbuminuria, without long-term current use of insulin (H) E11.29 metFORMIN (GLUCOPHAGE-XR) 500 MG 24 hr tablet    R80.9    2. Hyperlipidemia LDL goal <100 E78.5 simvastatin (ZOCOR) 40 MG tablet   3. Essential hypertension with goal blood pressure less than 140/90 " I10 losartan (COZAAR) 25 MG tablet   4. Cough R05 omeprazole (PRILOSEC) 20 MG CR capsule     Talked with HAIDER Motley regarding May.  She didn't feel the Farxiga would have significant side effects, maybe just making her more thirsty.    May wasn't interested in restarting insulin - but we discussed that something needs to change.  She didn't think she could cut more carbs from her diet.     Blood pressure is fairly well controlled.   She is unfortunately starting to spill some protein in her urine.     Julia will reach out to May.     Julia Reyna M.D.      Patient Instructions   Dietdoctor.[place]        Thank you for choosing [facility].  You may be receiving a survey in the mail from [employer] regarding your visit today.  Please take a few minutes to complete and return the survey to let us know how we are doing.      Our Clinic hours are:  Mondays    7:20 am - 7 pm  Tues -  Fri  7:20 am - 5 pm    Clinic Phone: [phone]    The clinic lab opens at 7:30 am Mon - Fri and appointments are required.    [facility] [place]  Ph. [phone]  Monday-Thursday 8 am - 7pm  Tues/Wed/Fri 8 am - 5:30 pm

## 2018-02-08 ENCOUNTER — TELEPHONE (OUTPATIENT)
Dept: EDUCATION SERVICES | Facility: CLINIC | Age: 48
End: 2018-02-08

## 2018-02-08 NOTE — TELEPHONE ENCOUNTER
Discussed pt case with Dr. Reyna, A1c is elevated still needs some changes done.    Ideas discussed were starting the Farxiga that she opted not to start last time or starting some insulin.  Left a message for pt to call me back.  Julia Parks RD, JULIANE      Pt is going to start the Farxiga and will call me with blood sugars in two weeks. Julia Parks RD, JULIANE

## 2018-03-02 ENCOUNTER — VIRTUAL VISIT (OUTPATIENT)
Dept: EDUCATION SERVICES | Facility: CLINIC | Age: 48
End: 2018-03-02
Payer: COMMERCIAL

## 2018-03-02 DIAGNOSIS — E11.9 TYPE 2 DIABETES MELLITUS WITHOUT COMPLICATION, WITHOUT LONG-TERM CURRENT USE OF INSULIN (H): ICD-10-CM

## 2018-03-02 RX ORDER — DAPAGLIFLOZIN 5 MG/1
5 TABLET, FILM COATED ORAL DAILY
Qty: 90 TABLET | Refills: 1 | Status: SHIPPED | OUTPATIENT
Start: 2018-03-02 | End: 2018-09-05

## 2018-03-02 NOTE — PROGRESS NOTES
Diabetes Education Contact:    Pt called and is doing great on Farxiga 5 mg dose.  -110 range and wt is down 7 lbs.    No changes in dose since BG are controlled, increased Rx since only 30 days given to see if tolerated and if dosing appropriate. Julia Parks RD, CDE

## 2018-03-02 NOTE — MR AVS SNAPSHOT
"              After Visit Summary   3/2/2018    May Wade    MRN: [id number]           Patient Information     Date Of Birth          1970        Visit Information        Provider Department      3/2/2018 4:00 PM Julia Parks RD [facility]        Today's Diagnoses     Type 2 diabetes mellitus without complication, without long-term current use of insulin (H)           Follow-ups after your visit        Your next 10 appointments already scheduled     Mar 02, 2018  4:00 PM CST   Telephone Visit with Julia Parks RD   [facility] ([facility])    [address] [facility] [id number] [phone]           Note: this is not an onsite visit; there is no need to come to the facility.              Who to contact     If you have questions or need follow up information about today's clinic visit or your schedule please contact [facility] directly at [phone].  Normal or non-critical lab and imaging results will be communicated to you by Heliatekhart, letter or phone within 4 business days after the clinic has received the results. If you do not hear from us within 7 days, please contact the clinic through Heliatekhart or phone. If you have a critical or abnormal lab result, we will notify you by phone as soon as possible.  Submit refill requests through Linux Networx or call your pharmacy and they will forward the refill request to us. Please allow 3 business days for your refill to be completed.          Additional Information About Your Visit        Heliatekhart Information     Linux Networx lets you send messages to your doctor, view your test results, renew your prescriptions, schedule appointments and more. To sign up, go to [email or web address] . Click on \"Log in\" on the left side of the screen, which will take you to the Welcome page. Then click on \"Sign up Now\" on the right side of the page.     You will be asked to " enter the access code listed below, as well as some personal information. Please follow the directions to create your username and password.     Your access code is: [email or web address]  Expires: 2018  2:56 PM     Your access code will  in 90 days. If you need help or a new code, please call your [place] clinic or [phone].        Care EveryWhere ID     This is your Care EveryWhere ID. This could be used by other organizations to access your [place] medical records  [id number]         Blood Pressure from Last 3 Encounters:   18 133/80   17 124/76   03/10/17 130/89    Weight from Last 3 Encounters:   18 66.7 kg (147 lb)   17 66.8 kg (147 lb 3.2 oz)   03/10/17 67.1 kg (148 lb)              Today, you had the following     No orders found for display         Where to get your medicines      These medications were sent to [facility] [address]  [address] [facility] [address]     Phone:  [phone]     dapagliflozin 5 MG Tabs tablet          Primary Care Provider Office Phone # Fax #    Julia Reyna [phone] [address] [facility] [address]        Equal Access to Services     [facility]: Hadii aad ku hadasho Soorestesali, waaxda luqadaha, qaybta kaalmada neilyada, avery chavez . So [facility] [phone].    ATENCIÓN: Si habla español, tiene a bustillo disposición servicios gratuitos de asistencia lingüística. Llame al [phone].    We comply with applicable federal civil rights laws and [place] laws. We do not discriminate on the basis of race, color, national origin, age, disability, sex, sexual orientation, or gender identity.            Thank you!     Thank you for choosing [facility]  for your care. Our goal is always to provide you with excellent care. Hearing back from our patients is one way we can continue to improve our  services. Please take a few minutes to complete the written survey that you may receive in the mail after your visit with us. Thank you!             Your Updated Medication List - Protect others around you: Learn how to safely use, store and throw away your medicines at www.disposemymeds.org.          This list is accurate as of 3/2/18  3:55 PM.  Always use your most recent med list.                   Brand Name Dispense Instructions for use Diagnosis    * blood glucose monitoring meter device kit     1 kit    Use to test blood sugars 2 times daily or as directed.    Type 2 diabetes, HbA1C goal < 8% (H)       * blood glucose monitoring meter device kit     1 kit    Use to test blood sugars 4 daily as directed.    Uncomplicated type 2 diabetes mellitus (H)       * blood glucose monitoring test strip    no brand specified    1 Box    1 strip by In Vitro route 2 times daily **now using Yunier Contour Next**    Uncomplicated type 2 diabetes mellitus (H)       * blood glucose monitoring test strip    ACCU-CHEK SANDRA PLUS    200 strip    Use to test blood sugar 2 times daily or as directed.  Ok to substitute alternative if insurance prefers.    Type 2 diabetes mellitus without complication, without long-term current use of insulin (H)       dapagliflozin 5 MG Tabs tablet    FARXIGA    90 tablet    Take 1 tablet (5 mg) by mouth daily    Type 2 diabetes mellitus without complication, without long-term current use of insulin (H)       dulaglutide 1.5 MG/0.5ML pen    TRULICITY    2 mL    Inject 1.5 mg Subcutaneous every 7 days    Type 2 diabetes mellitus without complication, without long-term current use of insulin (H)       fish oil-omega-3 fatty acids 1000 MG capsule     360 capsule    Take 2 capsules by mouth 2 times daily.    Hypertriglyceridemia       losartan 25 MG tablet    COZAAR    90 tablet    Take 1 tablet (25 mg) by mouth daily    Essential hypertension with goal blood pressure less than 140/90       metFORMIN 500  MG 24 hr tablet    GLUCOPHAGE-XR    360 tablet    Take 2 tablets (1,000 mg) by mouth 2 times daily (with meals)    Type 2 diabetes mellitus with microalbuminuria, without long-term current use of insulin (H)       omeprazole 20 MG CR capsule    priLOSEC    90 capsule    TAKE ONE CAPSULE BY MOUTH DAILY. TAKE 30 TO 60 MINUTES BEFORE A MEAL    Cough       simvastatin 40 MG tablet    ZOCOR    90 tablet    Take 1 tablet (40 mg) by mouth At Bedtime    Hyperlipidemia LDL goal <100       * Notice:  This list has 4 medication(s) that are the same as other medications prescribed for you. Read the directions carefully, and ask your doctor or other care provider to review them with you.

## 2018-04-06 ENCOUNTER — RADIANT APPOINTMENT (OUTPATIENT)
Dept: GENERAL RADIOLOGY | Facility: CLINIC | Age: 48
End: 2018-04-06
Attending: PEDIATRICS
Payer: COMMERCIAL

## 2018-04-06 ENCOUNTER — OFFICE VISIT (OUTPATIENT)
Dept: ORTHOPEDICS | Facility: CLINIC | Age: 48
End: 2018-04-06
Payer: COMMERCIAL

## 2018-04-06 VITALS
BODY MASS INDEX: 29.64 KG/M2 | HEIGHT: 59 IN | SYSTOLIC BLOOD PRESSURE: 132 MMHG | DIASTOLIC BLOOD PRESSURE: 78 MMHG | WEIGHT: 147 LBS

## 2018-04-06 DIAGNOSIS — M25.551 PAIN OF BOTH HIP JOINTS: ICD-10-CM

## 2018-04-06 DIAGNOSIS — M25.552 PAIN OF BOTH HIP JOINTS: Primary | ICD-10-CM

## 2018-04-06 DIAGNOSIS — M25.552 PAIN OF BOTH HIP JOINTS: ICD-10-CM

## 2018-04-06 DIAGNOSIS — M25.551 PAIN OF BOTH HIP JOINTS: Primary | ICD-10-CM

## 2018-04-06 PROCEDURE — 99204 OFFICE O/P NEW MOD 45 MIN: CPT | Performed by: PEDIATRICS

## 2018-04-06 PROCEDURE — 73522 X-RAY EXAM HIPS BI 3-4 VIEWS: CPT

## 2018-04-06 NOTE — PROGRESS NOTES
"Sports Medicine Clinic Visit    PCP: Julia Reyna    May Wade is a 47 year old female who is seen  as a self referral presenting with right > left hip pain.    Injury: Patient denies specific injury. Reports chronic bilateral hip pain and IT band pain for ~25 years. Reports significant increase in hip pain (R > L) and \"aching into the bone\" of the femurs over the past month.  No new injury.  Likely overuse as she skates frequently.    Location of Pain: bilateral hips (R > L)  Duration of Pain: 1 month(s)  Rating of Pain at worst: 7/10  Rating of Pain Currently: 5/10  Symptoms are better with: e-stim  Symptoms are worse with: standing, sleeping, skating  Additional Features:   Positive: popping, grinding and catching, sharp pain   Negative: swelling, bruising, locking, instability, paresthesias, numbness, weakness, pain in other joints and systemic symptoms  Other evaluation and/or treatments so far consists of: Tylenol and e-stim  Prior History of related problems: Chronic bilateral hip pain for ~25 years    Social History: Figure skating     Review of Systems  Skin: no bruising, no swelling  Musculoskeletal: as above  Neurologic: no numbness, paresthesias  Remainder of review of systems is negative including constitutional, CV, pulmonary, GI, except as noted in HPI or medical history.    Patient's current problem list, past medical and surgical history, and family history were reviewed.    Patient Active Problem List   Diagnosis     Hypertriglyceridemia     Hyperlipidemia LDL goal <100     Uncomplicated type 2 diabetes mellitus (H)     HTN, goal below 140/90     Esophageal reflux     overweight BMI >30     Type 2 diabetes mellitus with microalbuminuria, without long-term current use of insulin (H)     Past Medical History:   Diagnosis Date     cervical dysplasia 1990    s/p cryo, normal since     Chronic hip pain 10/25/2006    neurontin     gestational diabetes     pregnancy # 2     Migraines     on " "amitryptiline     Pure hypercholesterolemia     on lipitor     Past Surgical History:   Procedure Laterality Date     CRYOTHERAPY, CERVICAL       HC HYSTEROSCOPY, SURGICAL; W/ ENDOMETRIAL ABLATION, ANY METHOD  08    Novasure     SURGICAL HISTORY OF -   2004    Left wrist dorsal ganglion cyst excision-Removal of the recurrent dorsal boss.     SURGICAL HISTORY OF -       Right Ankle Surgery (Ganglion Cyst)     SURGICAL HISTORY OF -       Breast Reduction     SURGICAL HISTORY OF -       Abdominal Wall Revision     SURGICAL HISTORY OF -   2003    Left Ankle Cyst Removal     SURGICAL HISTORY OF -       Left ankle surgery for ganglion cyst,      SURGICAL HISTORY OF -       Left wrist bone and cyst surgery     Family History   Problem Relation Age of Onset     C.A.D. Father       age 51     Respiratory Father      smoker     CEREBROVASCULAR DISEASE Maternal Grandmother      Hypertension Maternal Grandmother      C.A.D. Paternal Grandfather      Asthma No family hx of      DIABETES No family hx of      Breast Cancer No family hx of      Cancer - colorectal No family hx of      Prostate Cancer No family hx of        Objective  /78 (BP Location: Left arm, Patient Position: Sitting, Cuff Size: Adult Regular)  Ht 4' 11\" (1.499 m)  Wt 147 lb (66.7 kg)  BMI 29.69 kg/m2    GENERAL APPEARANCE: healthy, alert and no distress   GAIT: NORMAL  SKIN: no suspicious lesions or rashes  HEENT: Sclera clear, anicteric  CV: good peripheral pulses  RESP: Breathing not labored  NEURO: Normal strength and tone, mentation intact and speech normal  PSYCH:  mentation appears normal and affect normal/bright    Bilateral hip exam    Inspection:      no edema or ecchymosis in hip area    Tender:      greater trochanter bilateral       groin bilateral    Non Tender:      remainder of the hip area bilateral    ROM:     Full active and passive ROM  Bilateral - pain with end ROM in internal and external " ROM    Strength:      flexion 5/5 bilateral       abduction 5/5 bilateral       adduction 5/5 bilateral    Sensation:      grossly intact in hip and thigh    Special Tests:      positive (+) SARAH bilateral       positive (+) FADIR bilateral       positive (+) scour bilateral       positive (+) fulcrum test  bilateral    Radiology  I ordered, visualized and reviewed these images with the patient  PELVIS AND BILATERAL HIP ONE VIEW  4/6/2018 4:04 PM    HISTORY: Pain of both hip joints.    COMPARISON: None.  IMPRESSION: No acute fracture or dislocation. No evidence of  arthritis.    Assessment:  1. Pain of both hip joints      Chronic bilateral hip pain, worse recently.  Broad differential given exam today including labral tear, greater trochanteric pain syndrome, SI joint involvement.  We discussed the following treatment options: symptom treatment, activity modification/rest, imaging, rehab and referral. Following discussion, plan: will obtain MR arthrogram to evaluate for labral tearing and consider therapy or referral pending clinical course.    Plan:  - Today's Plan of Care:  MRI of the hips--Call [phone] to schedule MRI  Rehab: Physical Therapy: [facility] - [phone]    -We also discussed other future treatment options:  Surgical referral    Follow Up: In clinic with Dr. Lieberman after MRI (wait at least 1-2 days)    Concerning signs and symptoms were reviewed.  The patient expressed understanding of this management plan and all questions were answered at this time.    Krista Lieberman MD CAQ  Primary Care Sports Medicine  [facility]

## 2018-04-06 NOTE — MR AVS SNAPSHOT
After Visit Summary   4/6/2018    May Wade    MRN: [id number]           Patient Information     Date Of Birth          1970        Visit Information        Provider Department      4/6/2018 3:40 PM Krista Lieberman MD [facility]        Today's Diagnoses     Pain of both hip joints    -  1      Care Instructions    Plan:  - Today's Plan of Care:  MRI of the hips--Call [phone] to schedule MRI  Rehab: Physical Therapy: [facility] - [phone]    -We also discussed other future treatment options:  Surgical referral    Follow Up: In clinic with Dr. Lieberman after MRI (wait at least 1-2 days)    If you have any further questions for your physician or physician s care team you can call [phone] and use option 3 to leave a voice message. Calls received during business hours will be returned same day.            Follow-ups after your visit        Additional Services     PHYSICAL THERAPY REFERRAL       *This therapy referral will be filtered to a centralized scheduling office at [facility] and the patient will receive a call to schedule an appointment at a [facility] location most convenient for them. *     [facility] provides Physical Therapy evaluation and treatment and many specialty services across the [facility] system.  If requesting a specialty program, please choose from the list below.    If you have not heard from the scheduling office within 2 business days, please call [phone] for all locations, with the exception of [place], please call [phone] and [facility], please call [phone]  Treatment: Evaluation & Treatment  Special Instructions/Modalities: None  Special Programs: None    Please be aware that coverage of these services is subject to the terms and limitations of your health insurance plan.  Call member services at your health plan with any benefit or coverage questions.   "    **Note to Provider:  If you are referring outside of [facility] for the therapy appointment, please list the name of the location in the \"special instructions\" above, print the referral and give to the patient to schedule the appointment.                  Future tests that were ordered for you today     Open Future Orders        Priority Expected Expires Ordered    MR Hip Bilateral w Contrast Routine  4/6/2019 4/6/2018    XR Hip Arthrogram Left Routine 4/6/2018 4/6/2019 4/6/2018    XR Hip Arthrogram Right Routine 4/6/2018 4/6/2019 4/6/2018            Who to contact     If you have questions or need follow up information about today's clinic visit or your schedule please contact [facility] directly at [phone].  Normal or non-critical lab and imaging results will be communicated to you by MyChart, letter or phone within 4 business days after the clinic has received the results. If you do not hear from us within 7 days, please contact the clinic through MyChart or phone. If you have a critical or abnormal lab result, we will notify you by phone as soon as possible.  Submit refill requests through [employer] or call your pharmacy and they will forward the refill request to us. Please allow 3 business days for your refill to be completed.          Additional Information About Your Visit        [employer] Information     [employer] lets you send messages to your doctor, view your test results, renew your prescriptions, schedule appointments and more. To sign up, go to [email or web address] . Click on \"Log in\" on the left side of the screen, which will take you to the Welcome page. Then click on \"Sign up Now\" on the right side of the page.     You will be asked to enter the access code listed below, as well as some personal information. Please follow the directions to create your username and password.     Your access code is: [email or web address]  Expires: 5/8/2018  3:56 PM     Your access code " "will  in 90 days. If you need help or a new code, please call your [facility] clinic or [phone].        Care EveryWhere ID     This is your Care EveryWhere ID. This could be used by other organizations to access your [facility] medical records  [id number]        Your Vitals Were     Height BMI (Body Mass Index)                4' 11\" (1.499 m) 29.69 kg/m2           Blood Pressure from Last 3 Encounters:   18 132/78   18 133/80   17 124/76    Weight from Last 3 Encounters:   18 147 lb (66.7 kg)   18 147 lb (66.7 kg)   17 147 lb 3.2 oz (66.8 kg)              We Performed the Following     PHYSICAL THERAPY REFERRAL        Primary Care Provider Office Phone # Fax #    Julia Reyna [phone] [address] [facility] [address]        Equal Access to Services     [facility] : Hadii aad ku hadasho Soomaali, waaxda luqadaha, qaybta kaalmada adeegyada, waxay idiin haymildredn neil meadaraaisha la'mildredn . So [facility] [phone].    ATENCIÓN: Si habla español, tiene a bustillo disposición servicios gratuitos de asistencia lingüística. Llame al [phone].    We comply with applicable federal civil rights laws and [place] laws. We do not discriminate on the basis of race, color, national origin, age, disability, sex, sexual orientation, or gender identity.            Thank you!     Thank you for choosing [facility]  for your care. Our goal is always to provide you with excellent care. Hearing back from our patients is one way we can continue to improve our services. Please take a few minutes to complete the written survey that you may receive in the mail after your visit with us. Thank you!             Your Updated Medication List - Protect others around you: Learn how to safely use, store and throw away your medicines at www.disposemymeds.org.          This list is accurate as of 18  4:26 PM.  Always use your most recent med " list.                   Brand Name Dispense Instructions for use Diagnosis    * blood glucose monitoring meter device kit     1 kit    Use to test blood sugars 2 times daily or as directed.    Type 2 diabetes, HbA1C goal < 8% (H)       * blood glucose monitoring meter device kit     1 kit    Use to test blood sugars 4 daily as directed.    Uncomplicated type 2 diabetes mellitus (H)       * blood glucose monitoring test strip    no brand specified    1 Box    1 strip by In Vitro route 2 times daily **now using Yunier Contour Next**    Uncomplicated type 2 diabetes mellitus (H)       * blood glucose monitoring test strip    ACCU-CHEK SANDRA PLUS    200 strip    Use to test blood sugar 2 times daily or as directed.  Ok to substitute alternative if insurance prefers.    Type 2 diabetes mellitus without complication, without long-term current use of insulin (H)       dapagliflozin 5 MG Tabs tablet    FARXIGA    90 tablet    Take 1 tablet (5 mg) by mouth daily    Type 2 diabetes mellitus without complication, without long-term current use of insulin (H)       dulaglutide 1.5 MG/0.5ML pen    TRULICITY    2 mL    Inject 1.5 mg Subcutaneous every 7 days    Type 2 diabetes mellitus without complication, without long-term current use of insulin (H)       fish oil-omega-3 fatty acids 1000 MG capsule     360 capsule    Take 2 capsules by mouth 2 times daily.    Hypertriglyceridemia       losartan 25 MG tablet    COZAAR    90 tablet    Take 1 tablet (25 mg) by mouth daily    Essential hypertension with goal blood pressure less than 140/90       metFORMIN 500 MG 24 hr tablet    GLUCOPHAGE-XR    360 tablet    Take 2 tablets (1,000 mg) by mouth 2 times daily (with meals)    Type 2 diabetes mellitus with microalbuminuria, without long-term current use of insulin (H)       omeprazole 20 MG CR capsule    priLOSEC    90 capsule    TAKE ONE CAPSULE BY MOUTH DAILY. TAKE 30 TO 60 MINUTES BEFORE A MEAL    Cough       simvastatin 40 MG tablet     ZOCOR    90 tablet    Take 1 tablet (40 mg) by mouth At Bedtime    Hyperlipidemia LDL goal <100       * Notice:  This list has 4 medication(s) that are the same as other medications prescribed for you. Read the directions carefully, and ask your doctor or other care provider to review them with you.

## 2018-04-06 NOTE — PATIENT INSTRUCTIONS
Plan:  - Today's Plan of Care:  MRI of the hips--Call [phone] to schedule MRI  Rehab: Physical Therapy: [facility] - [phone]    -We also discussed other future treatment options:  Surgical referral    Follow Up: In clinic with Dr. Lieberman after MRI (wait at least 1-2 days)    If you have any further questions for your physician or physician s care team you can call [phone] and use option 3 to leave a voice message. Calls received during business hours will be returned same day.

## 2018-04-25 DIAGNOSIS — E11.9 TYPE 2 DIABETES MELLITUS WITHOUT COMPLICATION, WITHOUT LONG-TERM CURRENT USE OF INSULIN (H): ICD-10-CM

## 2018-04-25 RX ORDER — DULAGLUTIDE 1.5 MG/.5ML
INJECTION, SOLUTION SUBCUTANEOUS
Qty: 2 ML | Refills: 5 | Status: SHIPPED | OUTPATIENT
Start: 2018-04-25 | End: 2018-10-08

## 2018-04-25 NOTE — TELEPHONE ENCOUNTER
"Requested Prescriptions   Pending Prescriptions Disp Refills     TRULICITY 1.5 MG/0.5ML pen [Pharmacy Med Name: TRULICITY 1.5MG/0.5ML SOPN]  Last Written Prescription Date:  11/08/2017  Last Fill Quantity: 2ml,  # refills: 5   Last office visit: 2/7/2018 with prescribing provider:  Axel   Future Office Visit:     2 mL 5     Sig: INJECT 1.5MG SUBCUTANEOUSLY EVERY 7 DAYS    GLP-1 Agonists Protocol Passed    4/25/2018  7:47 AM       Passed - Blood pressure less than 140/90 in past 6 months    BP Readings from Last 3 Encounters:   04/06/18 132/78   02/07/18 133/80   06/27/17 124/76                Passed - LDL on file in past 12 months    Recent Labs   Lab Test  02/06/18   0838   LDL  33            Passed - Microalbumin on file in past 12 months    Recent Labs   Lab Test  02/06/18   0845   MICROL  106   UMALCR  38.41*            Passed - HgbA1C in past 3 or 6 months    Recent Labs   Lab Test  02/06/18   0838   A1C  9.7*            Passed - Patient is age 18 or older       Passed - No active pregnancy on record       Passed - Normal serum creatinine on file in past 12 months    Recent Labs   Lab Test  02/06/18   0838   CR  0.58            Passed - No positive pregnancy test in past 12 months       Passed - Recent (6 mo) or future (30 days) visit within the authorizing provider's specialty    Patient had office visit in the last 6 months or has a visit in the next 30 days with authorizing provider.  See \"Patient Info\" tab in inbasket, or \"Choose Columns\" in Meds & Orders section of the refill encounter.            David Deleon RT (R)    "

## 2018-05-17 ENCOUNTER — TELEPHONE (OUTPATIENT)
Dept: FAMILY MEDICINE | Facility: CLINIC | Age: 48
End: 2018-05-17

## 2018-05-17 DIAGNOSIS — R80.9 TYPE 2 DIABETES MELLITUS WITH MICROALBUMINURIA, WITHOUT LONG-TERM CURRENT USE OF INSULIN (H): Primary | ICD-10-CM

## 2018-05-17 DIAGNOSIS — E11.29 TYPE 2 DIABETES MELLITUS WITH MICROALBUMINURIA, WITHOUT LONG-TERM CURRENT USE OF INSULIN (H): Primary | ICD-10-CM

## 2018-05-17 NOTE — TELEPHONE ENCOUNTER
Met in consultation for:  Diabetes Mellitus    Parameters Addressed:  A1C    Recommended follow-up:  Lab only appt    Considerations:  due to have A1c done to see how the new medication is working.  please call patient     Julia Reyna M.D.

## 2018-05-24 NOTE — TELEPHONE ENCOUNTER
Left detailed message on patient's personal machine that lab was due to see how medication was going.    Blanche Ho MA

## 2018-05-30 DIAGNOSIS — R80.9 TYPE 2 DIABETES MELLITUS WITH MICROALBUMINURIA, WITHOUT LONG-TERM CURRENT USE OF INSULIN (H): ICD-10-CM

## 2018-05-30 DIAGNOSIS — E11.29 TYPE 2 DIABETES MELLITUS WITH MICROALBUMINURIA, WITHOUT LONG-TERM CURRENT USE OF INSULIN (H): ICD-10-CM

## 2018-05-30 LAB — HBA1C MFR BLD: 6.7 % (ref 0–5.6)

## 2018-05-30 PROCEDURE — 83036 HEMOGLOBIN GLYCOSYLATED A1C: CPT | Performed by: FAMILY MEDICINE

## 2018-05-30 PROCEDURE — 36415 COLL VENOUS BLD VENIPUNCTURE: CPT | Performed by: FAMILY MEDICINE

## 2018-08-16 ENCOUNTER — RADIANT APPOINTMENT (OUTPATIENT)
Dept: GENERAL RADIOLOGY | Facility: CLINIC | Age: 48
End: 2018-08-16
Attending: FAMILY MEDICINE
Payer: COMMERCIAL

## 2018-08-16 ENCOUNTER — OFFICE VISIT (OUTPATIENT)
Dept: FAMILY MEDICINE | Facility: CLINIC | Age: 48
End: 2018-08-16
Payer: COMMERCIAL

## 2018-08-16 VITALS
HEART RATE: 88 BPM | TEMPERATURE: 97.8 F | DIASTOLIC BLOOD PRESSURE: 64 MMHG | HEIGHT: 59 IN | WEIGHT: 143.4 LBS | RESPIRATION RATE: 16 BRPM | SYSTOLIC BLOOD PRESSURE: 102 MMHG | BODY MASS INDEX: 28.91 KG/M2

## 2018-08-16 DIAGNOSIS — R30.0 DYSURIA: ICD-10-CM

## 2018-08-16 DIAGNOSIS — M79.644 THUMB PAIN, RIGHT: ICD-10-CM

## 2018-08-16 DIAGNOSIS — M79.645 THUMB PAIN, LEFT: ICD-10-CM

## 2018-08-16 DIAGNOSIS — M79.645 THUMB PAIN, LEFT: Primary | ICD-10-CM

## 2018-08-16 LAB
ALBUMIN UR-MCNC: NEGATIVE MG/DL
APPEARANCE UR: CLEAR
BILIRUB UR QL STRIP: NEGATIVE
COLOR UR AUTO: YELLOW
GLUCOSE UR STRIP-MCNC: 500 MG/DL
HGB UR QL STRIP: NEGATIVE
KETONES UR STRIP-MCNC: ABNORMAL MG/DL
LEUKOCYTE ESTERASE UR QL STRIP: NEGATIVE
NITRATE UR QL: NEGATIVE
PH UR STRIP: 5 PH (ref 5–7)
SOURCE: ABNORMAL
SP GR UR STRIP: 1.02 (ref 1–1.03)
UROBILINOGEN UR STRIP-ACNC: 0.2 EU/DL (ref 0.2–1)

## 2018-08-16 PROCEDURE — 73140 X-RAY EXAM OF FINGER(S): CPT | Mod: RT

## 2018-08-16 PROCEDURE — 81003 URINALYSIS AUTO W/O SCOPE: CPT | Performed by: FAMILY MEDICINE

## 2018-08-16 PROCEDURE — 99213 OFFICE O/P EST LOW 20 MIN: CPT | Performed by: FAMILY MEDICINE

## 2018-08-16 PROCEDURE — 73140 X-RAY EXAM OF FINGER(S): CPT | Mod: LT

## 2018-08-16 NOTE — MR AVS SNAPSHOT
After Visit Summary   8/16/2018    May Wade    MRN: [id number]           Patient Information     Date Of Birth          1970        Visit Information        Provider Department      8/16/2018 2:00 PM Post, ELYSE Cabrera MD [facility]        Today's Diagnoses     UTI (urinary tract infection)    -  1    Thumb pain, left        Thumb pain, right           Follow-ups after your visit        Additional Services     ORTHO  REFERRAL       [facility] is referring you to the Orthopedic  Services at [facility].       The  Representative will assist you in the coordination of your Orthopedic and Musculoskeletal Care as prescribed by your physician.    The  Representative will call you within 1 business day to help schedule your appointment, or you may contact the  Representative at:    All areas ~ [phone]     Type of Referral : Surgical / Specialist  (Hand specialist)      Timeframe requested: Routine    Coverage of these services is subject to the terms and limitations of your health insurance plan.  Please call member services at your health plan with any benefit or coverage questions.      If X-rays, CT or MRI's have been performed, please contact the facility where they were done to arrange for , prior to your scheduled appointment.  Please bring this referral request to your appointment and present it to your specialist.                  Who to contact     If you have questions or need follow up information about today's clinic visit or your schedule please contact [facility] directly at [phone].  Normal or non-critical lab and imaging results will be communicated to you by MyChart, letter or phone within 4 business days after the clinic has received the results. If you do not hear from us within 7 days, please contact the clinic through MyChart or phone. If you  "have a critical or abnormal lab result, we will notify you by phone as soon as possible.  Submit refill requests through [employer] or call your pharmacy and they will forward the refill request to us. Please allow 3 business days for your refill to be completed.          Additional Information About Your Visit        Care EveryWhere ID     This is your Care EveryWhere ID. This could be used by other organizations to access your [facility] medical records  [id number]        Your Vitals Were     Pulse Temperature Respirations Height BMI (Body Mass Index)       88 97.8  F (36.6  C) (Tympanic) 16 4' 11\" (1.499 m) 28.96 kg/m2        Blood Pressure from Last 3 Encounters:   08/16/18 102/64   04/06/18 132/78   02/07/18 133/80    Weight from Last 3 Encounters:   08/16/18 143 lb 6.4 oz (65 kg)   04/06/18 147 lb (66.7 kg)   02/07/18 147 lb (66.7 kg)              We Performed the Following     *UA reflex to Microscopic and Culture ([facility] [facility] (except [place] and [place])     ORTHO  REFERRAL        Primary Care Provider Office Phone # Fax #    Julia Reyna [phone] [address] [facility] [address]        Equal Access to Services     AGGIE MALIK : Hadii aad ku hadasho Soomaali, waaxda luqadaha, qaybta kaalmada adeegyada, waxay idiin haymildredn neil bullock laada scott. So [facility] [phone].    ATENCIÓN: Si habla español, tiene a bustillo disposición servicios gratuitos de asistencia lingüística. Llame al [phone].    We comply with applicable federal civil rights laws and [place] laws. We do not discriminate on the basis of race, color, national origin, age, disability, sex, sexual orientation, or gender identity.            Thank you!     Thank you for choosing [facility]  for your care. Our goal is always to provide you with excellent care. Hearing back from our patients is one way we can continue to improve our services. Please take a few minutes to " complete the written survey that you may receive in the mail after your visit with us. Thank you!             Your Updated Medication List - Protect others around you: Learn how to safely use, store and throw away your medicines at www.disposemymeds.org.          This list is accurate as of 8/16/18  3:00 PM.  Always use your most recent med list.                   Brand Name Dispense Instructions for use Diagnosis    * blood glucose monitoring meter device kit     1 kit    Use to test blood sugars 2 times daily or as directed.    Type 2 diabetes, HbA1C goal < 8% (H)       * blood glucose monitoring meter device kit     1 kit    Use to test blood sugars 4 daily as directed.    Uncomplicated type 2 diabetes mellitus (H)       * blood glucose monitoring test strip    no brand specified    1 Box    1 strip by In Vitro route 2 times daily **now using Yunier Contour Next**    Uncomplicated type 2 diabetes mellitus (H)       * blood glucose monitoring test strip    ACCU-CHEK SANDRA PLUS    200 strip    Use to test blood sugar 2 times daily or as directed.  Ok to substitute alternative if insurance prefers.    Type 2 diabetes mellitus without complication, without long-term current use of insulin (H)       dapagliflozin 5 MG Tabs tablet    FARXIGA    90 tablet    Take 1 tablet (5 mg) by mouth daily    Type 2 diabetes mellitus without complication, without long-term current use of insulin (H)       fish oil-omega-3 fatty acids 1000 MG capsule     360 capsule    Take 2 capsules by mouth 2 times daily.    Hypertriglyceridemia       losartan 25 MG tablet    COZAAR    90 tablet    Take 1 tablet (25 mg) by mouth daily    Essential hypertension with goal blood pressure less than 140/90       metFORMIN 500 MG 24 hr tablet    GLUCOPHAGE-XR    360 tablet    Take 2 tablets (1,000 mg) by mouth 2 times daily (with meals)    Type 2 diabetes mellitus with microalbuminuria, without long-term current use of insulin (H)       omeprazole 20 MG  CR capsule    priLOSEC    90 capsule    TAKE ONE CAPSULE BY MOUTH DAILY. TAKE 30 TO 60 MINUTES BEFORE A MEAL    Cough       simvastatin 40 MG tablet    ZOCOR    90 tablet    Take 1 tablet (40 mg) by mouth At Bedtime    Hyperlipidemia LDL goal <100       TRULICITY 1.5 MG/0.5ML pen   Generic drug:  dulaglutide     2 mL    INJECT 1.5MG SUBCUTANEOUSLY EVERY 7 DAYS    Type 2 diabetes mellitus without complication, without long-term current use of insulin (H)       * Notice:  This list has 4 medication(s) that are the same as other medications prescribed for you. Read the directions carefully, and ask your doctor or other care provider to review them with you.

## 2018-08-16 NOTE — PROGRESS NOTES
Subjective:  May Wade is a 47 year old female   Chief Complaint   Patient presents with     Patient Request     pt. is here today with concerns bilateral wrist/thumb pain. pt. has had issues with this in the past. surgery on left wrist Boss. pt. has history of ganglion cysts.      UTI     X 1 week. burning with urination, frequency and urgency.      20 years ago she had surgery on the dorsum of her left hand for removal of what turned out to be a benign bony prominence.  They were concerned at the time that it was bone cancer but all the biopsies were benign.  She really has not had any trouble until recently when she began noticing pain in the wrist in the base of the thumb.  She is also having similar symptoms in the right wrist which has not had any previous surgery.  She has difficulty grasping with either wrist/hand because of the pain.  For example she is significant difficulty opening the gas cap for her vehicle to put more gasoline into the tank.  She has tried some ibuprofen with modest relief of her discomfort    She also has noted some symptoms of burning with urination, moderate frequency, and the sensation that she needs to get to the bathroom quickly.  No previous history of UTIs that she can recall        Encounter Diagnoses   Name Primary?     Thumb pain, left Yes     Thumb pain, right      Dysuria        ROS:other than noted above, general, HEENT, respiratory, cardiac, gastrointestinal systems are negative    Medical, surgical, social, and family histories, medications and allergies reviewed and updated.    Objective:  Exam:    GENERAL APPEARANCE ADULT: Alert, no acute distress  EYES: PERRL, EOM normal, conjunctiva and lids normal  MS: Right hand and thumb: No swelling or deformity but significant tenderness at the CMC joint and pain with flexing the thumb against resistance; no ganglion noted  Left hand and thumb: Old scar from her previous surgery but otherwise no swelling or deformity, no  ganglion noted.  There is tenderness again at the CMC joint and pain with flexing the thumb against resistance    X-ray of both thumbs shows no bony abnormality, confirmed by radiology    Results for orders placed or performed in visit on 08/16/18   *UA reflex to Microscopic and Culture ([facility] (except [place] and [place])   Result Value Ref Range    Color Urine Yellow     Appearance Urine Clear     Glucose Urine 500 (A) NEG^Negative mg/dL    Bilirubin Urine Negative NEG^Negative    Ketones Urine Trace (A) NEG^Negative mg/dL    Specific Gravity Urine 1.025 1.003 - 1.035    Blood Urine Negative NEG^Negative    pH Urine 5.0 5.0 - 7.0 pH    Protein Albumin Urine Negative NEG^Negative mg/dL    Urobilinogen Urine 0.2 0.2 - 1.0 EU/dL    Nitrite Urine Negative NEG^Negative    Leukocyte Esterase Urine Negative NEG^Negative    Source Urine         ASSESSMENT:  1. Thumb pain, left    2. Thumb pain, right    3. Dysuria      Her urine is fairly concentrated so I suspect her symptoms of dysuria and urgency are from irritation of the bladder from the concentrated urine      PLAN:  Orders Placed This Encounter     XR Finger Left G/E 2 Views     XR Finger Right G/E 2 Views     *UA reflex to Microscopic and Culture ([facility] (except [place] and [place])     ORTHO  REFERRAL       \Encouraged to drink more fluids and that should help with her urinary symptoms.  Referral to the hand specialist for recommendations on her pain at the CMC joint of both thumbs

## 2018-09-05 ENCOUNTER — TELEPHONE (OUTPATIENT)
Dept: FAMILY MEDICINE | Facility: CLINIC | Age: 48
End: 2018-09-05

## 2018-09-05 DIAGNOSIS — E11.29 TYPE 2 DIABETES MELLITUS WITH MICROALBUMINURIA, WITHOUT LONG-TERM CURRENT USE OF INSULIN (H): ICD-10-CM

## 2018-09-05 DIAGNOSIS — E11.9 TYPE 2 DIABETES MELLITUS WITHOUT COMPLICATION, WITHOUT LONG-TERM CURRENT USE OF INSULIN (H): ICD-10-CM

## 2018-09-05 DIAGNOSIS — R80.9 TYPE 2 DIABETES MELLITUS WITH MICROALBUMINURIA, WITHOUT LONG-TERM CURRENT USE OF INSULIN (H): ICD-10-CM

## 2018-09-05 DIAGNOSIS — I10 ESSENTIAL HYPERTENSION WITH GOAL BLOOD PRESSURE LESS THAN 140/90: ICD-10-CM

## 2018-09-06 RX ORDER — LOSARTAN POTASSIUM 25 MG/1
25 TABLET ORAL DAILY
Qty: 90 TABLET | Refills: 1 | Status: SHIPPED | OUTPATIENT
Start: 2018-09-06 | End: 2019-01-21

## 2018-09-06 RX ORDER — DAPAGLIFLOZIN 5 MG/1
5 TABLET, FILM COATED ORAL DAILY
Qty: 90 TABLET | Refills: 0 | Status: SHIPPED | OUTPATIENT
Start: 2018-09-06 | End: 2018-10-24

## 2018-09-06 RX ORDER — METFORMIN HCL 500 MG
1000 TABLET, EXTENDED RELEASE 24 HR ORAL 2 TIMES DAILY WITH MEALS
Qty: 360 TABLET | Refills: 0 | Status: SHIPPED | OUTPATIENT
Start: 2018-09-06 | End: 2018-10-24

## 2018-09-06 NOTE — TELEPHONE ENCOUNTER
To be clear, she was just seen in August, but it was not for her diabetes.  She had an HgbA1c done at the end of May and her HgbA1c was good at that time, she is due again for a diabetes check in November/early December.      Prescription refilled.  Notify patient.    Julia Reyna M.D.      BP Readings from Last 6 Encounters:   08/16/18 102/64   04/06/18 132/78   02/07/18 133/80   06/27/17 124/76   03/10/17 130/89   10/17/16 108/52

## 2018-09-06 NOTE — TELEPHONE ENCOUNTER
Patient notified of refills sent to pharmacy by provider and clarification on diabetes check, MD's recommendation for follow up  Patient verbalized understanding and agreed with this plan    Lida STEVENS Rn

## 2018-09-06 NOTE — TELEPHONE ENCOUNTER
Due for clinic visit. Patient states she was just seen in August and recent A1C was great. Would like further refills    Routing refill request to provider for review/approval because:  This RN would only be able to provide 30 day supply. Patient requesting further refills     Kelli WORKMAN RN

## 2018-09-06 NOTE — TELEPHONE ENCOUNTER
"Requested Prescriptions   Pending Prescriptions Disp Refills     metFORMIN (GLUCOPHAGE-XR) 500 MG 24 hr tablet 360 tablet 1     Sig: Take 2 tablets (1,000 mg) by mouth 2 times daily (with meals)    Biguanide Agents Passed    9/5/2018  3:38 PM       Passed - Blood pressure less than 140/90 in past 6 months    BP Readings from Last 3 Encounters:   08/16/18 102/64   04/06/18 132/78   02/07/18 133/80          Passed - Patient has documented LDL within the past 12 mos.    Recent Labs   Lab Test  02/06/18   0838   LDL  33          Passed - Patient has had a Microalbumin in the past 15 mos.    Recent Labs   Lab Test  02/06/18   0845   MICROL  106   UMALCR  38.41*          Passed - Patient is age 10 or older       Passed - Patient has documented A1c within the specified period of time.    If HgbA1C is 8 or greater, it needs to be on file within the past 3 months.  If less than 8, must be on file within the past 6 months.     Recent Labs   Lab Test  05/30/18   1525   A1C  6.7*          Passed - Patient's CR is NOT>1.4 OR Patient's EGFR is NOT<45 within past 12 mos.    Recent Labs   Lab Test  02/06/18   0838   GFRESTIMATED  >90   GFRESTBLACK  >90     Recent Labs   Lab Test  02/06/18   0838   CR  0.58          Passed - Patient does NOT have a diagnosis of CHF.       Passed - Patient is not pregnant       Passed - Patient has not had a positive pregnancy test within the past 12 mos.        Passed - Recent (6 mo) or future (30 days) visit within the authorizing provider's specialty    Patient had office visit in the last 6 months or has a visit in the next 30 days with authorizing provider or within the authorizing provider's specialty.  See \"Patient Info\" tab in inbasket, or \"Choose Columns\" in Meds & Orders section of the refill encounter.          dapagliflozin (FARXIGA) 5 MG TABS tablet 90 tablet 1     Sig: Take 1 tablet (5 mg) by mouth daily    Sodium Glucose Co-Transport Inhibitor Agents Passed    9/5/2018  3:38 PM       " "Passed - Blood pressure less than 140/90 in past 6 months    BP Readings from Last 3 Encounters:   08/16/18 102/64   04/06/18 132/78   02/07/18 133/80          Passed - Patient has documented LDL within the past 12 mos.    Recent Labs   Lab Test  02/06/18   0838   LDL  33          Passed - Patient has had a Microalbumin in the past 12 mos.    Recent Labs   Lab Test  02/06/18   0845   MICROL  106   UMALCR  38.41*          Passed - Patient has documented A1c within the specified period of time.    If HgbA1C is 8 or greater, it needs to be on file within the past 3 months.  If less than 8, must be on file within the past 6 months.     Recent Labs   Lab Test  05/30/18   1525   A1C  6.7*          Passed - No creatinine >1.4 or GFR <45 within the past 12 mos    Recent Labs   Lab Test  02/06/18   0838   GFRESTIMATED  >90   GFRESTBLACK  >90     Recent Labs   Lab Test  02/06/18   0838   CR  0.58          Passed - Patient is age 18 or older       Passed - Patient is not pregnant       Passed - Patient has documented normal Potassium within the last 12 mos.    Recent Labs   Lab Test  02/06/18   0838   POTASSIUM  3.8          Passed - Patient has no positive pregnancy test within the past 12 mos.       Passed - Recent (6 mo) or future (30 days) visit within the authorizing provider's specialty    Patient had office visit in the last 6 months or has a visit in the next 30 days with authorizing provider or within the authorizing provider's specialty.  See \"Patient Info\" tab in inbasket, or \"Choose Columns\" in Meds & Orders section of the refill encounter.          losartan (COZAAR) 25 MG tablet 90 tablet 1     Sig: Take 1 tablet (25 mg) by mouth daily    Angiotensin-II Receptors Passed    9/5/2018  3:38 PM       Passed - Blood pressure under 140/90 in past 12 months    BP Readings from Last 3 Encounters:   08/16/18 102/64   04/06/18 132/78   02/07/18 133/80          Passed - Recent (12 mo) or future (30 days) visit within the " "authorizing provider's specialty    Patient had office visit in the last 12 months or has a visit in the next 30 days with authorizing provider or within the authorizing provider's specialty.  See \"Patient Info\" tab in inbasket, or \"Choose Columns\" in Meds & Orders section of the refill encounter.         Passed - Patient is age 18 or older       Passed - No active pregnancy on record       Passed - Normal serum creatinine on file in past 12 months    Recent Labs   Lab Test  02/06/18   0838   CR  0.58          Passed - Normal serum potassium on file in past 12 months    Recent Labs   Lab Test  02/06/18   0838   POTASSIUM  3.8          Passed - No positive pregnancy test in past 12 months          "

## 2018-10-08 DIAGNOSIS — E11.9 TYPE 2 DIABETES MELLITUS WITHOUT COMPLICATION, WITHOUT LONG-TERM CURRENT USE OF INSULIN (H): ICD-10-CM

## 2018-10-09 RX ORDER — DULAGLUTIDE 1.5 MG/.5ML
INJECTION, SOLUTION SUBCUTANEOUS
Qty: 2 ML | Refills: 1 | Status: SHIPPED | OUTPATIENT
Start: 2018-10-09 | End: 2018-10-24

## 2018-10-09 NOTE — TELEPHONE ENCOUNTER
"Requested Prescriptions   Pending Prescriptions Disp Refills     TRULICITY 1.5 MG/0.5ML pen [Pharmacy Med Name: TRULICITY 1.5MG/0.5ML SOPN]  Last Written Prescription Date:  4/25/2018  Last Fill Quantity: 2ml,  # refills: 5   Last office visit: 2/7/2018 with prescribing provider:  Axel    Future Office Visit:     2 mL 5     Sig: INJECT 1.5MG SUBCUTANEOUSLY EVERY 7 DAYS    GLP-1 Agonists Protocol Passed    10/8/2018 12:02 PM       Passed - Blood pressure less than 140/90 in past 6 months    BP Readings from Last 3 Encounters:   08/16/18 102/64   04/06/18 132/78   02/07/18 133/80                Passed - LDL on file in past 12 months    Recent Labs   Lab Test  02/06/18   0838   LDL  33            Passed - Microalbumin on file in past 12 months    Recent Labs   Lab Test  02/06/18   0845   MICROL  106   UMALCR  38.41*            Passed - HgbA1C in past 3 or 6 months    If HgbA1C is 8 or greater, it needs to be on file within the past 3 months.  If less than 8, must be on file within the past 6 months.     Recent Labs   Lab Test  05/30/18   1525   A1C  6.7*            Passed - Patient is age 18 or older       Passed - No active pregnancy on record       Passed - Normal serum creatinine on file in past 12 months    Recent Labs   Lab Test  02/06/18   0838   CR  0.58            Passed - No positive pregnancy test in past 12 months       Passed - Recent (6 mo) or future (30 days) visit within the authorizing provider's specialty    Patient had office visit in the last 6 months or has a visit in the next 30 days with authorizing provider.  See \"Patient Info\" tab in inbasket, or \"Choose Columns\" in Meds & Orders section of the refill encounter.              "

## 2018-10-09 NOTE — TELEPHONE ENCOUNTER
Prescription approved per [facility] Refill Protocol.  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    Agnes ROD RN

## 2018-10-24 ENCOUNTER — ALLIED HEALTH/NURSE VISIT (OUTPATIENT)
Dept: EDUCATION SERVICES | Facility: CLINIC | Age: 48
End: 2018-10-24
Payer: COMMERCIAL

## 2018-10-24 ENCOUNTER — TELEPHONE (OUTPATIENT)
Dept: EDUCATION SERVICES | Facility: CLINIC | Age: 48
End: 2018-10-24

## 2018-10-24 VITALS — WEIGHT: 140.2 LBS | BODY MASS INDEX: 28.32 KG/M2

## 2018-10-24 DIAGNOSIS — R80.9 TYPE 2 DIABETES MELLITUS WITH MICROALBUMINURIA, WITHOUT LONG-TERM CURRENT USE OF INSULIN (H): ICD-10-CM

## 2018-10-24 DIAGNOSIS — E11.9 TYPE 2 DIABETES MELLITUS WITHOUT COMPLICATION, WITHOUT LONG-TERM CURRENT USE OF INSULIN (H): ICD-10-CM

## 2018-10-24 DIAGNOSIS — R80.9 TYPE 2 DIABETES MELLITUS WITH MICROALBUMINURIA, WITHOUT LONG-TERM CURRENT USE OF INSULIN (H): Primary | ICD-10-CM

## 2018-10-24 DIAGNOSIS — E11.29 TYPE 2 DIABETES MELLITUS WITH MICROALBUMINURIA, WITHOUT LONG-TERM CURRENT USE OF INSULIN (H): Primary | ICD-10-CM

## 2018-10-24 DIAGNOSIS — E11.29 TYPE 2 DIABETES MELLITUS WITH MICROALBUMINURIA, WITHOUT LONG-TERM CURRENT USE OF INSULIN (H): ICD-10-CM

## 2018-10-24 LAB — HBA1C MFR BLD: 7.5 % (ref 0–5.6)

## 2018-10-24 PROCEDURE — 36415 COLL VENOUS BLD VENIPUNCTURE: CPT | Performed by: FAMILY MEDICINE

## 2018-10-24 PROCEDURE — 83036 HEMOGLOBIN GLYCOSYLATED A1C: CPT | Performed by: FAMILY MEDICINE

## 2018-10-24 PROCEDURE — G0108 DIAB MANAGE TRN  PER INDIV: HCPCS | Performed by: DIETITIAN, REGISTERED

## 2018-10-24 RX ORDER — METFORMIN HCL 500 MG
1000 TABLET, EXTENDED RELEASE 24 HR ORAL 2 TIMES DAILY WITH MEALS
Qty: 360 TABLET | Refills: 0 | Status: SHIPPED | OUTPATIENT
Start: 2018-10-24 | End: 2019-01-21

## 2018-10-24 RX ORDER — DAPAGLIFLOZIN 5 MG/1
5 TABLET, FILM COATED ORAL DAILY
Qty: 90 TABLET | Refills: 0 | Status: SHIPPED | OUTPATIENT
Start: 2018-10-24 | End: 2018-10-24 | Stop reason: DRUGHIGH

## 2018-10-24 NOTE — MR AVS SNAPSHOT
After Visit Summary   10/24/2018    May Wade    MRN: [id number]           Patient Information     Date Of Birth          1970        Visit Information        Provider Department      10/24/2018 2:00 PM Julia Parks RD [facility]        Today's Diagnoses     Type 2 diabetes mellitus without complication, without long-term current use of insulin (H)        Type 2 diabetes mellitus with microalbuminuria, without long-term current use of insulin (H)          Care Instructions    1.  Get saving card for jardiance.  Your dose will be 25 mg daily.    2.  You are doing great on the weight loss.          Follow-ups after your visit        Your next 10 appointments already scheduled     Oct 24, 2018  3:00 PM CDT   [facility]    [address] [place] [address] [phone]           Please do not eat 10-12 hours before your appointment if you are coming in fasting for labs on lipids, cholesterol, or glucose (sugar). This does not apply to pregnant women. Water, hot tea and black coffee (with nothing added) are okay. Do not drink other fluids, diet soda or chew gum.              Who to contact     If you have questions or need follow up information about today's clinic visit or your schedule please contact [facility] directly at [phone].  Normal or non-critical lab and imaging results will be communicated to you by MyChart, letter or phone within 4 business days after the clinic has received the results. If you do not hear from us within 7 days, please contact the clinic through MyChart or phone. If you have a critical or abnormal lab result, we will notify you by phone as soon as possible.  Submit refill requests through [employer] or call your pharmacy and they will forward the refill request to us. Please allow 3 business days for your refill to be  completed.          Additional Information About Your Visit        Care EveryWhere ID     This is your Care EveryWhere ID. This could be used by other organizations to access your [facility] medical records  [id number]        Your Vitals Were     BMI (Body Mass Index)                   28.32 kg/m2            Blood Pressure from Last 3 Encounters:   08/16/18 102/64   04/06/18 132/78   02/07/18 133/80    Weight from Last 3 Encounters:   10/24/18 63.6 kg (140 lb 3.2 oz)   08/16/18 65 kg (143 lb 6.4 oz)   04/06/18 66.7 kg (147 lb)                 Today's Medication Changes          These changes are accurate as of 10/24/18  2:51 PM.  If you have any questions, ask your nurse or doctor.               These medicines have changed or have updated prescriptions.        Dose/Directions    dulaglutide 1.5 MG/0.5ML pen   Commonly known as:  TRULICITY   This may have changed:  See the new instructions.   Used for:  Type 2 diabetes mellitus without complication, without long-term current use of insulin (H)   Changed by:  Julia Parks RD        Dose:  1.5 mg   Inject 1.5 mg Subcutaneous every 7 days   Quantity:  2 mL   Refills:  1         Stop taking these medicines if you haven't already. Please contact your care team if you have questions.     dapagliflozin 5 MG Tabs tablet   Commonly known as:  FARXIGA   Stopped by:  Julia Parks RD                Where to get your medicines      These medications were sent to [facility] [address] [facility]  [address] [address], [place]. \[facility] Outlook.\"" [address]     Phone:  [phone]     dulaglutide 1.5 MG/0.5ML pen    metFORMIN 500 MG 24 hr tablet                Primary Care Provider Office Phone # Fax #    Julia Reyna [phone] [address]  [facility] [address]        Equal Access to Services     AGGIE MALIK AH: Lexy mcneill Sojojo, waaxda luqadaha, qaybta avery rodrigues  bostonjoaisha velezLatricianess ah. So [facility] [phone].    ATENCIÓN: Si cezarla nelson, tiene a bustillo disposición servicios gratuitos de asistencia lingüística. Dee al [phone].    We comply with applicable federal civil rights laws and [place] laws. We do not discriminate on the basis of race, color, national origin, age, disability, sex, sexual orientation, or gender identity.            Thank you!     Thank you for choosing [facility]  for your care. Our goal is always to provide you with excellent care. Hearing back from our patients is one way we can continue to improve our services. Please take a few minutes to complete the written survey that you may receive in the mail after your visit with us. Thank you!             Your Updated Medication List - Protect others around you: Learn how to safely use, store and throw away your medicines at www.disposemymeds.org.          This list is accurate as of 10/24/18  2:51 PM.  Always use your most recent med list.                   Brand Name Dispense Instructions for use Diagnosis    * blood glucose monitoring meter device kit     1 kit    Use to test blood sugars 2 times daily or as directed.    Type 2 diabetes, HbA1C goal < 8% (H)       * blood glucose monitoring meter device kit     1 kit    Use to test blood sugars 4 daily as directed.    Uncomplicated type 2 diabetes mellitus (H)       * blood glucose monitoring test strip    no brand specified    1 Box    1 strip by In Vitro route 2 times daily **now using Yunier Contour Next**    Uncomplicated type 2 diabetes mellitus (H)       * blood glucose monitoring test strip    ACCU-CHEK SANDRA PLUS    200 strip    Use to test blood sugar 2 times daily or as directed.  Ok to substitute alternative if insurance prefers.    Type 2 diabetes mellitus without complication, without long-term current use of insulin (H)       dulaglutide 1.5 MG/0.5ML pen    TRULICITY    2 mL    Inject 1.5 mg Subcutaneous every 7  days    Type 2 diabetes mellitus without complication, without long-term current use of insulin (H)       fish oil-omega-3 fatty acids 1000 MG capsule     360 capsule    Take 2 capsules by mouth 2 times daily.    Hypertriglyceridemia       losartan 25 MG tablet    COZAAR    90 tablet    Take 1 tablet (25 mg) by mouth daily    Essential hypertension with goal blood pressure less than 140/90       metFORMIN 500 MG 24 hr tablet    GLUCOPHAGE-XR    360 tablet    Take 2 tablets (1,000 mg) by mouth 2 times daily (with meals)    Type 2 diabetes mellitus with microalbuminuria, without long-term current use of insulin (H)       omeprazole 20 MG CR capsule    priLOSEC    90 capsule    TAKE ONE CAPSULE BY MOUTH DAILY. TAKE 30 TO 60 MINUTES BEFORE A MEAL    Cough       simvastatin 40 MG tablet    ZOCOR    90 tablet    Take 1 tablet (40 mg) by mouth At Bedtime    Hyperlipidemia LDL goal <100       * Notice:  This list has 4 medication(s) that are the same as other medications prescribed for you. Read the directions carefully, and ask your doctor or other care provider to review them with you.

## 2018-10-24 NOTE — PATIENT INSTRUCTIONS
1.  Get saving card for jardiance.  Your dose will be 25 mg daily.    2.  You are doing great on the weight loss.

## 2018-10-24 NOTE — PROGRESS NOTES
"Diabetes Self-Management Education & Support    Diabetes Education Self Management & Training    SUBJECTIVE/OBJECTIVE:  Presents for: Individual review  Accompanied by: Self  Diabetes education in the past 24mo: (P) Yes  Diabetes type: (P) Type 2  Disease course: (P) Improving  Transportation concerns: No  Other concerns:: None  Cultural Influences/Ethnic Background:  American      Diabetes Symptoms & Complications  Blurred vision: (P) No  Fatigue: (P) No  Foot ulcerations: (P) No  Polydipsia: (P) No  Polyphagia: (P) No  Polyuria: (P) No  Visual change: (P) Yes  Weakness: (P) No  Weight loss: (P) Yes  Weight trend: (P) Stable  Autonomic neuropathy: (P) No  CVA: (P) No  Heart disease: (P) No  Nephropathy: (P) No  Peripheral neuropathy: (P) No  Peripheral Vascular Disease: (P) No  Retinopathy: (P) No  Sexual dysfunction: (P) No    Patient Problem List and Family Medical History reviewed for relevant medical history, current medical status, and diabetes risk factors.    Vitals:  Wt 63.6 kg (140 lb 3.2 oz)  BMI 28.32 kg/m2  Estimated body mass index is 28.32 kg/(m^2) as calculated from the following:    Height as of 8/16/18: 1.499 m (4' 11\").    Weight as of this encounter: 63.6 kg (140 lb 3.2 oz).   Last 3 BP:   BP Readings from Last 3 Encounters:   08/16/18 102/64   04/06/18 132/78   02/07/18 133/80       History   Smoking Status     Passive Smoke Exposure - Never Smoker   Smokeless Tobacco     Never Used       Labs:  Lab Results   Component Value Date    A1C 6.7 05/30/2018     Lab Results   Component Value Date     02/06/2018     Lab Results   Component Value Date    LDL 33 02/06/2018     HDL Cholesterol   Date Value Ref Range Status   02/06/2018 39 (L) >49 mg/dL Final   ]  GFR Estimate   Date Value Ref Range Status   02/06/2018 >90 >60 mL/min/1.7m2 Final     Comment:     Non  GFR Calc     GFR Estimate If Black   Date Value Ref Range Status   02/06/2018 >90 >60 mL/min/1.7m2 Final     Comment: "      GFR Calc     Lab Results   Component Value Date    CR 0.58 02/06/2018     No results found for: MICROALBUMIN    Healthy Eating  Healthy Eating Assessed Today: Yes  Cultural/Zoroastrian diet restrictions?: (P) No  Meal planning: (P) Avoiding sweets, Calorie counting, Carbohydrate counting, Low salt, Smaller portions, Plate planning method  Meals include: (P) Lunch, Dinner, Snacks  Breakfast: not always eat it  Lunch: early lunch, broasted chix and apple, 5 bites potato salad  Dinner: salad or taco or chili  Snacks: apple  Beverages: (P) Water, Coffee drinks  Has patient met with a dietitian in the past?: (P) Yes    Being Active  Being Active Assessed Today: Yes  Exercise:: Yes  Days per week of moderate to strenuous exercise (like a brisk walk): 4 (skating , on ice all the time)  On average, minutes per day of exercise at this level:  (12 hours)  How intense was your typical exercise? : (P) Heavy (like jogging or swimming  Barrier to exercise: (P) Time    Monitoring  Monitoring Assessed Today: Yes (not testing blood sugars much, was 110 yesterday)  Did patient bring glucose meter to appointment? : No  Blood Glucose Meter: (P) [employer]  Home Glucose (Sugar) Monitoring: (P) 1-2 times per week  Blood glucose trend: (P) No change  Low Glucose Range (mg/dL): (P)   High Glucose Range (mg/dL): (P) 130-140  Overall Range (mg/dL): (P) 110-130        Taking Medications  Diabetes Medication(s)     Biguanides Sig    metFORMIN (GLUCOPHAGE-XR) 500 MG 24 hr tablet Take 2 tablets (1,000 mg) by mouth 2 times daily (with meals)    Sodium-Glucose Co-Transporter 2 (SGLT2) Inhibitors Sig    dapagliflozin (FARXIGA) 5 MG TABS tablet Take 1 tablet (5 mg) by mouth daily    Incretin Mimetic Agents (GLP-1 Receptor Agonists) Sig    dulaglutide (TRULICITY) 1.5 MG/0.5ML pen Inject 1.5 mg Subcutaneous every 7 days          Current Treatments: (P) Diet, Insulin Injections, Oral Agent (dual therapy)    Problem  Solving  Hypoglycemia Frequency: (P) Monthly  Hypoglycemia Treatment: (P) Juice, Other food  Patient carries a carbohydrate source: (P) Yes  Medical alert: (P) No  Severe weather/disaster plan for diabetes management?: (P) Yes  DKA prevention plan?: (P) No  Sick day plan for diabetes management?: (P) Yes    Hypoglycemia symptoms  Confusion: (P) No  Dizziness or Light-Headedness: (P) Yes  Headaches: (P) Yes  Hunger: (P) Yes  Mood changes: (P) No  Nervousness/Anxiety: (P) No  Sleepiness: (P) Yes  Speech difficulty: (P) No  Sweats: (P) No  Tremors: (P) Yes    Hypoglycemia Complications  Blackouts: (P) No  Hospitalization: (P) No  Nocturnal hypoglycemia: (P) No  Required assistance: (P) Yes  Required glucagon injection: (P) Yes  Seizures: (P) No    Reducing Risks  CAD Risks: (P) No known risk factors  Has dilated eye exam at least once a year?: (P) Yes  Sees dentist every 6 months?: (P) Yes  Sees podiatrist (foot doctor)?: (P) No    Healthy Coping  Informal Support system:: (P) Children, Family, Friends, Parent, Spouse  Difficulty affording diabetes management supplies?: (P) No  Patient Activation Measure Survey Score:  SHUBHAM Score (Last Two) 12/28/2010   SHUBHAM Raw Score 52   Activation Score 100   SHUBHAM Level 4       ASSESSMENT:  a1c up some, will increase the farxiga to 10 mg daily  Doing well with meal plan  Wt is still going down  No issues        Patient's most recent   Lab Results   Component Value Date    A1C 6.7 05/30/2018    is not meeting goal of <7.0, a1c now 7.5%    INTERVENTION:   Diabetes knowledge and skills assessment:     Patient is knowledgeable in diabetes management concepts related to: Healthy Eating, Being Active, Monitoring and Taking Medication    Patient needs further education on the following diabetes management concepts: Healthy Eating, Being Active, Monitoring, Taking Medication, Problem Solving, Reducing Risks and Healthy Coping    Based on learning assessment above, most appropriate setting for  further diabetes education would be: Individual setting.    Education provided today on:  AADE Self-Care Behaviors:  Healthy Eating: consistency in amount, composition, and timing of food intake  Being Active: relationship to blood glucose and describe appropriate activity program  Monitoring: individual blood glucose targets and frequency of monitoring  Taking Medication: action of prescribed medication and when to take medications    Opportunities for ongoing education and support in diabetes-self management were discussed.    Pt verbalized understanding of concepts discussed and recommendations provided today.       Education Materials Provided:  No new materials provided today    PLAN:  See Patient Instructions for co-developed, patient-stated behavior change goals.  AVS printed and provided to patient today. See Follow-Up section for recommended follow-up.      Time Spent: 60 minutes  Encounter Type: Individual    Any diabetes medication dose changes were made via the CDE Protocol and Collaborative Practice Agreement with the patient's primary care provider. A copy of this encounter was shared with the provider.

## 2018-10-24 NOTE — TELEPHONE ENCOUNTER
Dr. Reyna,    Would like to switch May to Jardiance since it is the one on her formulary.  She needs the higher dose since her a1c went up to 7.5%.  Are you ok with Jardiance 25 mg daily?  Thanks!     Julia Parks RD, CDE

## 2018-11-12 ENCOUNTER — OFFICE VISIT (OUTPATIENT)
Dept: FAMILY MEDICINE | Facility: CLINIC | Age: 48
End: 2018-11-12
Payer: COMMERCIAL

## 2018-11-12 VITALS
BODY MASS INDEX: 28.71 KG/M2 | WEIGHT: 142.4 LBS | HEART RATE: 80 BPM | RESPIRATION RATE: 16 BRPM | TEMPERATURE: 98.1 F | HEIGHT: 59 IN | SYSTOLIC BLOOD PRESSURE: 110 MMHG | OXYGEN SATURATION: 96 % | DIASTOLIC BLOOD PRESSURE: 80 MMHG

## 2018-11-12 DIAGNOSIS — E11.29 TYPE 2 DIABETES MELLITUS WITH MICROALBUMINURIA, WITHOUT LONG-TERM CURRENT USE OF INSULIN (H): ICD-10-CM

## 2018-11-12 DIAGNOSIS — I10 HTN, GOAL BELOW 140/90: ICD-10-CM

## 2018-11-12 DIAGNOSIS — E78.5 HYPERLIPIDEMIA LDL GOAL <100: ICD-10-CM

## 2018-11-12 DIAGNOSIS — R80.9 TYPE 2 DIABETES MELLITUS WITH MICROALBUMINURIA, WITHOUT LONG-TERM CURRENT USE OF INSULIN (H): ICD-10-CM

## 2018-11-12 DIAGNOSIS — Z01.818 PREOP GENERAL PHYSICAL EXAM: Primary | ICD-10-CM

## 2018-11-12 DIAGNOSIS — M67.432 GANGLION CYST OF WRIST, LEFT: ICD-10-CM

## 2018-11-12 LAB
ANION GAP SERPL CALCULATED.3IONS-SCNC: 8 MMOL/L (ref 3–14)
BUN SERPL-MCNC: 9 MG/DL (ref 7–30)
CALCIUM SERPL-MCNC: 9.4 MG/DL (ref 8.5–10.1)
CHLORIDE SERPL-SCNC: 100 MMOL/L (ref 94–109)
CO2 SERPL-SCNC: 29 MMOL/L (ref 20–32)
CREAT SERPL-MCNC: 0.6 MG/DL (ref 0.52–1.04)
GFR SERPL CREATININE-BSD FRML MDRD: >90 ML/MIN/1.7M2
GLUCOSE SERPL-MCNC: 108 MG/DL (ref 70–99)
POTASSIUM SERPL-SCNC: 3.7 MMOL/L (ref 3.4–5.3)
SODIUM SERPL-SCNC: 137 MMOL/L (ref 133–144)

## 2018-11-12 PROCEDURE — 80048 BASIC METABOLIC PNL TOTAL CA: CPT | Performed by: FAMILY MEDICINE

## 2018-11-12 PROCEDURE — 36415 COLL VENOUS BLD VENIPUNCTURE: CPT | Performed by: FAMILY MEDICINE

## 2018-11-12 PROCEDURE — 99214 OFFICE O/P EST MOD 30 MIN: CPT | Performed by: FAMILY MEDICINE

## 2018-11-12 NOTE — LETTER
[facility]  [address]  [facility] [address]  Phone: [phone]      11/13/2018     May Wade  [id number] [facility]  [place] [address]      Dear May:    Thank you for allowing me to participate in your care. Your recent test results were reviewed and listed below.      Your results are provided below for your review  Your Basic metabolic lab results:  Lab Results   Component Value Date     11/12/2018     (desirable 133-144) - SODIUM  Lab Results   Component Value Date    POTASSIUM 3.7 11/12/2018    (desirable 3.4-5.3)  Lab Results   Component Value Date     11/12/2018     (normal value without eating is below 100; concerning if greater than 126 while fasting) - GLUCOSE  Lab Results   Component Value Date    BUN 9 11/12/2018    (kidney function test and for hydration; desirable 7-30)  Lab Results   Component Value Date    CR 0.60 11/12/2018     (kidney function test; desirable 0.52-1.04) - CREATNINE  Lab Results   Component Value Date    MARINA 9.4 11/12/2018     (desirable 8.5-10.4) - CALCIUM       Acceptable results.  Blood sugar is actually pretty good.  You should be fine for the upcoming surgery.           Thank you for choosing [facility]. As a result, please continue with the treatment plan discussed in the office. Return as discussed or sooner if symptoms worsen or fail to improve.     If you have any further questions or concerns, please do not hesitate to contact us.      Sincerely,        Dr. Rick Og

## 2018-11-12 NOTE — PROGRESS NOTES
[facility]  [address]  [facility] [address]  [phone]  Dept: [phone]    PRE-OP EVALUATION:  Today's date: 2018    May Wade (: 1970) presents for pre-operative evaluation assessment as requested by Dr. Perez.  She requires evaluation and anesthesia risk assessment prior to undergoing surgery/procedure for treatment of left wrist .    Proposed Surgery/ Procedure: left cyst excision 1st dorsal  Date of Surgery/ Procedure: 2018  Time of Surgery/ Procedure: yet to be determined  Hospital/Surgical Facility: [facility]    Primary Physician: Julia Ryena  Type of Anesthesia Anticipated: Local with MAC    Patient has a Health Care Directive or Living Will:  NO    1. NO - Do you have a history of heart attack, stroke, stent, bypass or surgery on an artery in the head, neck, heart or legs?  2. NO - Do you ever have any pain or discomfort in your chest?  3. NO - Do you have a history of  Heart Failure?  4. NO - Are you troubled by shortness of breath when: walking on the level, up a slight hill or at night?  5. NO - Do you currently have a cold, bronchitis or other respiratory infection?  6. NO - Do you have a cough, shortness of breath or wheezing?  7. NO - Do you sometimes get pains in the calves of your legs when you walk?  8. NO - Do you or anyone in your family have previous history of blood clots?  9. NO - Do you or does anyone in your family have a serious bleeding problem such as prolonged bleeding following surgeries or cuts?  10. NO - Have you ever had problems with anemia or been told to take iron pills?  11. NO - Have you had any abnormal blood loss such as black, tarry or bloody stools, or abnormal vaginal bleeding?  12. NO - Have you ever had a blood transfusion?  13. NO - Have you or any of your relatives ever had problems with anesthesia?  14. NO - Do you have sleep apnea, excessive snoring or daytime drowsiness?  15. NO -  Do you have any prosthetic heart valves?  16. NO - Do you have prosthetic joints?  17. NO - Is there any chance that you may be pregnant?      HPI:     HPI related to upcoming procedure: She has a painful ganglion cyst on the radial aspect of her left wrist which has persisted despite more conservative measures.      See problem list for active medical problems.  Problems all longstanding and stable, except as noted/documented.  See ROS for pertinent symptoms related to these conditions.                                                                                                                                                          .    MEDICAL HISTORY:     Patient Active Problem List    Diagnosis Date Noted     Type 2 diabetes mellitus with microalbuminuria, without long-term current use of insulin (H) 02/07/2018     Priority: Medium     overweight BMI >30 10/18/2015     Priority: Medium     Esophageal reflux 02/17/2014     Priority: Medium     HTN, goal below 140/90 12/09/2013     Priority: Medium     Uncomplicated type 2 diabetes mellitus (H) 01/06/2012     Priority: Medium     Diagnosed 1/2012    Started on lantus immediately  Now well controlled  Non-smoker         Hypertriglyceridemia 10/26/2010     Priority: Medium     Hyperlipidemia LDL goal <100 10/26/2010     Priority: Medium      Past Medical History:   Diagnosis Date     cervical dysplasia 1990    s/p cryo, normal since     Chronic hip pain 10/25/2006    neurontin     gestational diabetes     pregnancy # 2     Migraines     on amitryptiline     Pure hypercholesterolemia     on lipitor     Past Surgical History:   Procedure Laterality Date     CRYOTHERAPY, CERVICAL  1990     HC HYSTEROSCOPY, SURGICAL; W/ ENDOMETRIAL ABLATION, ANY METHOD  2/7/08    [facility]     SURGICAL HISTORY OF -   6/17/2004    Left wrist dorsal ganglion cyst excision-Removal of the recurrent dorsal boss.     SURGICAL HISTORY OF -   1994    Right Ankle Surgery (Ganglion Cyst)      SURGICAL HISTORY OF -       Breast Reduction     SURGICAL HISTORY OF -       Abdominal Wall Revision     SURGICAL HISTORY OF -   5/22/2003    Left Ankle Cyst Removal     SURGICAL HISTORY OF -   2001    Left ankle surgery for ganglion cyst,      SURGICAL HISTORY OF -       Left wrist bone and cyst surgery     Current Outpatient Prescriptions   Medication Sig Dispense Refill     blood glucose monitoring (ACCU-CHEK SANDRA PLUS) test strip Use to test blood sugar 2 times daily or as directed.  Ok to substitute alternative if insurance prefers. 200 strip 11     dulaglutide (TRULICITY) 1.5 MG/0.5ML pen Inject 1.5 mg Subcutaneous every 7 days 2 mL 1     empagliflozin (JARDIANCE) 25 MG TABS tablet Take 1 tablet (25 mg) by mouth daily 90 tablet 1     fish oil-omega-3 fatty acids (OMEGA 3) 1000 MG capsule Take 2 capsules by mouth 2 times daily. 360 capsule 3     losartan (COZAAR) 25 MG tablet Take 1 tablet (25 mg) by mouth daily 90 tablet 1     metFORMIN (GLUCOPHAGE-XR) 500 MG 24 hr tablet Take 2 tablets (1,000 mg) by mouth 2 times daily (with meals) 360 tablet 0     omeprazole (PRILOSEC) 20 MG CR capsule TAKE ONE CAPSULE BY MOUTH DAILY. TAKE 30 TO 60 MINUTES BEFORE A MEAL 90 capsule 3     simvastatin (ZOCOR) 40 MG tablet Take 1 tablet (40 mg) by mouth At Bedtime 90 tablet 3     blood glucose monitoring (YUNIER CONTOUR MONITOR) meter device kit Use to test blood sugars 2 times daily or as directed. 1 kit 0     blood glucose monitoring (NO BRAND SPECIFIED) test strip 1 strip by In Vitro route 2 times daily **now using Yunier Contour Next** 1 Box 11     blood glucose monitoring (ONE TOUCH ULTRA 2) meter device kit Use to test blood sugars 4 daily as directed. 1 kit 0     OTC products: None, except as noted above    Allergies   Allergen Reactions     Penicillins Nausea and Vomiting     Aspartame Other (See Comments)     Severe pain, passing out      Latex Allergy: NO    Social History   Substance Use Topics     Smoking status:  "Passive Smoke Exposure - Never Smoker     Smokeless tobacco: Never Used     Alcohol use Yes      Comment: 1 drink every 2 weeks     History   Drug Use No       REVIEW OF SYSTEMS:   CONSTITUTIONAL: NEGATIVE for fever, chills, change in weight  ENT/MOUTH: NEGATIVE for ear, mouth and throat problems  RESP: NEGATIVE for significant cough or SOB  CV: NEGATIVE for chest pain, palpitations or peripheral edema    EXAM:   /80  Pulse 80  Temp 98.1  F (36.7  C) (Tympanic)  Resp 16  Ht 4' 11\" (1.499 m)  Wt 142 lb 6.4 oz (64.6 kg)  SpO2 96%  BMI 28.76 kg/m2  GENERAL APPEARANCE: healthy, alert and no distress  HENT: ear canals and TM's normal and nose and mouth without ulcers or lesions  RESP: lungs clear to auscultation - no rales, rhonchi or wheezes  CV: regular rate and rhythm, normal S1 S2, no S3 or S4 and no murmur, click or rub   ABDOMEN: soft, nontender, no HSM or masses and bowel sounds normal  NEURO: Normal strength and tone, sensory exam grossly normal, mentation intact and speech normal    DIAGNOSTICS:     EKG: Not indicated due to non-vascular surgery and low risk of event (age <65 and without cardiac risk factors)  Labs Drawn and in Process:   Unresulted Labs Ordered in the Past 30 Days of this Admission     No orders found from 9/13/2018 to 11/13/2018.          Recent Labs   Lab Test  10/24/18   1435  05/30/18   1525  02/06/18   0838   03/10/17   1201   08/27/10   1011   HGB   --    --    --    --   12.8   --   14.2   PLT   --    --    --    --   170   --   190   NA   --    --   136   --   136   < >  139   POTASSIUM   --    --   3.8   --   3.9   < >  4.5   CR   --    --   0.58   --   0.58   < >  0.68   A1C  7.5*  6.7*  9.7*   < >   --    < >   --     < > = values in this interval not displayed.        IMPRESSION:   Reason for surgery/procedure: Painful ganglion cyst left wrist  Diagnosis/reason for consult: Evaluate for anesthesia risk    The proposed surgical procedure is considered LOW " risk.    REVISED CARDIAC RISK INDEX  The patient has the following serious cardiovascular risks for perioperative complications such as (MI, PE, VFib and 3  AV Block):  No serious cardiac risks  INTERPRETATION: 0 risks: Class I (very low risk - 0.4% complication rate)    The patient has the following additional risks for perioperative complications:  No identified additional risks      ICD-10-CM    1. Preop general physical exam Z01.818    2. Ganglion cyst of wrist, left M67.432    3. HTN, goal below 140/90 I10 Basic metabolic panel   4. Type 2 diabetes mellitus with microalbuminuria, without long-term current use of insulin (H) E11.29     R80.9    5. Hyperlipidemia LDL goal <100 E78.5        RECOMMENDATIONS:         --Patient is to take all scheduled medications on the day of surgery EXCEPT for modifications listed below.    Diabetes Medication Use  Will continue with the Trulicity once a week  -----Hold usual oral and non-insulin diabetic meds (e.g. Metformin, ) while NPO.  (She has not started the                             empagliflozin (JARDIANCE) 25 MG TABS tablet yet as she is using up to a supply of Trulicity)                                                                  APPROVAL GIVEN to proceed with proposed procedure, without further diagnostic evaluation       Signed Electronically by: ELYSE Og MD    Copy of this evaluation report is provided to requesting physician.    [facility] Preop Guidelines    Revised Cardiac Risk Index

## 2018-11-12 NOTE — MR AVS SNAPSHOT
After Visit Summary   11/12/2018    May Wade    MRN: [id number]           Patient Information     Date Of Birth          1970        Visit Information        Provider Department      11/12/2018 2:20 PM Post, ELYSE Cabrera MD [facility]        Today's Diagnoses     Preop general physical exam    -  1    Ganglion cyst of wrist, left        HTN, goal below 140/90        Type 2 diabetes mellitus with microalbuminuria, without long-term current use of insulin (H)        Hyperlipidemia LDL goal <100          Care Instructions      Before Your Surgery      Call your surgeon if there is any change in your health. This includes signs of a cold or flu (such as a sore throat, runny nose, cough, rash or fever).    Do not smoke, drink alcohol or take over the counter medicine (unless your surgeon or primary care doctor tells you to) for the 24 hours before and after surgery.    If you take prescribed drugs: Follow your doctor s orders about which medicines to take and which to stop until after surgery.    Eating and drinking prior to surgery: follow the instructions from your surgeon    Take a shower or bath the night before surgery. Use the soap your surgeon gave you to gently clean your skin. If you do not have soap from your surgeon, use your regular soap. Do not shave or scrub the surgery site.  Wear clean pajamas and have clean sheets on your bed.           Follow-ups after your visit        Who to contact     If you have questions or need follow up information about today's clinic visit or your schedule please contact [facility] [place] directly at [phone].  Normal or non-critical lab and imaging results will be communicated to you by MyChart, letter or phone within 4 business days after the clinic has received the results. If you do not hear from us within 7 days, please contact the clinic through MyChart or phone. If you have a critical or abnormal lab result, we  "will notify you by phone as soon as possible.  Submit refill requests through [employer] or call your pharmacy and they will forward the refill request to us. Please allow 3 business days for your refill to be completed.          Additional Information About Your Visit        Care EveryWhere ID     This is your Care EveryWhere ID. This could be used by other organizations to access your [facility] medical records  [id number]        Your Vitals Were     Pulse Temperature Respirations Height Pulse Oximetry BMI (Body Mass Index)    80 98.1  F (36.7  C) (Tympanic) 16 4' 11\" (1.499 m) 96% 28.76 kg/m2       Blood Pressure from Last 3 Encounters:   11/12/18 110/80   08/16/18 102/64   04/06/18 132/78    Weight from Last 3 Encounters:   11/12/18 142 lb 6.4 oz (64.6 kg)   10/24/18 140 lb 3.2 oz (63.6 kg)   08/16/18 143 lb 6.4 oz (65 kg)              We Performed the Following     Basic metabolic panel        Primary Care Provider Office Phone # Fax #    Julia Reyna [phone] [address] [facility] [address]        Equal Access to Services     NI MALIK AH: Hadii aad ku hadasho Soomaali, waaxda luqadaha, qaybta kaalmada adeegyada, avery morinin haymildredn neil bullock laelviran ah. So [facility] [phone].    ATENCIÓN: Si habla español, tiene a bustillo disposición servicios gratuitos de asistencia lingüística. Llame al [phone].    We comply with applicable federal civil rights laws and [place] laws. We do not discriminate on the basis of race, color, national origin, age, disability, sex, sexual orientation, or gender identity.            Thank you!     Thank you for choosing [facility]  for your care. Our goal is always to provide you with excellent care. Hearing back from our patients is one way we can continue to improve our services. Please take a few minutes to complete the written survey that you may receive in the mail after your visit with us. Thank you!             Your " Updated Medication List - Protect others around you: Learn how to safely use, store and throw away your medicines at www.disposemymeds.org.          This list is accurate as of 11/12/18  2:53 PM.  Always use your most recent med list.                   Brand Name Dispense Instructions for use Diagnosis    * blood glucose monitoring meter device kit     1 kit    Use to test blood sugars 2 times daily or as directed.    Type 2 diabetes, HbA1C goal < 8% (H)       * blood glucose monitoring meter device kit     1 kit    Use to test blood sugars 4 daily as directed.    Uncomplicated type 2 diabetes mellitus (H)       * blood glucose monitoring test strip    no brand specified    1 Box    1 strip by In Vitro route 2 times daily **now using Yunier Contour Next**    Uncomplicated type 2 diabetes mellitus (H)       * blood glucose monitoring test strip    ACCU-CHEK SANDRA PLUS    200 strip    Use to test blood sugar 2 times daily or as directed.  Ok to substitute alternative if insurance prefers.    Type 2 diabetes mellitus without complication, without long-term current use of insulin (H)       dulaglutide 1.5 MG/0.5ML pen    TRULICITY    2 mL    Inject 1.5 mg Subcutaneous every 7 days    Type 2 diabetes mellitus without complication, without long-term current use of insulin (H)       empagliflozin 25 MG Tabs tablet    JARDIANCE    90 tablet    Take 1 tablet (25 mg) by mouth daily    Type 2 diabetes mellitus with microalbuminuria, without long-term current use of insulin (H)       fish oil-omega-3 fatty acids 1000 MG capsule     360 capsule    Take 2 capsules by mouth 2 times daily.    Hypertriglyceridemia       losartan 25 MG tablet    COZAAR    90 tablet    Take 1 tablet (25 mg) by mouth daily    Essential hypertension with goal blood pressure less than 140/90       metFORMIN 500 MG 24 hr tablet    GLUCOPHAGE-XR    360 tablet    Take 2 tablets (1,000 mg) by mouth 2 times daily (with meals)    Type 2 diabetes mellitus with  microalbuminuria, without long-term current use of insulin (H)       omeprazole 20 MG CR capsule    priLOSEC    90 capsule    TAKE ONE CAPSULE BY MOUTH DAILY. TAKE 30 TO 60 MINUTES BEFORE A MEAL    Cough       simvastatin 40 MG tablet    ZOCOR    90 tablet    Take 1 tablet (40 mg) by mouth At Bedtime    Hyperlipidemia LDL goal <100       * Notice:  This list has 4 medication(s) that are the same as other medications prescribed for you. Read the directions carefully, and ask your doctor or other care provider to review them with you.

## 2018-11-13 NOTE — PROGRESS NOTES
Please SEND LETTER    Notify patient of acceptable results.  Blood sugar is actually pretty good.  You should be fine for the upcoming surgery

## 2018-12-21 DIAGNOSIS — E11.9 TYPE 2 DIABETES MELLITUS WITHOUT COMPLICATION, WITHOUT LONG-TERM CURRENT USE OF INSULIN (H): ICD-10-CM

## 2018-12-21 RX ORDER — DULAGLUTIDE 1.5 MG/.5ML
INJECTION, SOLUTION SUBCUTANEOUS
Qty: 2 ML | Refills: 0 | Status: SHIPPED | OUTPATIENT
Start: 2018-12-21 | End: 2019-01-21

## 2018-12-21 NOTE — TELEPHONE ENCOUNTER
Medication is being filled for 1 time refill only due to:  Patient needs to be seen because She is due for DM recheck..   Appt scheduled for 1-21-18.  Ana STEVENS RN

## 2018-12-21 NOTE — TELEPHONE ENCOUNTER
"Requested Prescriptions   Pending Prescriptions Disp Refills     TRULICITY 1.5 MG/0.5ML pen [Pharmacy Med Name: TRULICITY 1.5MG/0.5ML SOPN] 2 mL 1     Sig: INJECT 1.5MG SUBCUTANEOUSLY EVERY 7 DAYS    GLP-1 Agonists Protocol Passed - 12/21/2018 11:44 AM       Passed - Blood pressure less than 140/90 in past 6 months    BP Readings from Last 3 Encounters:   11/12/18 110/80   08/16/18 102/64   04/06/18 132/78                Passed - LDL on file in past 12 months    Recent Labs   Lab Test 02/06/18  0838   LDL 33            Passed - Microalbumin on file in past 12 months    Recent Labs   Lab Test 02/06/18  0845   MICROL 106   UMALCR 38.41*            Passed - HgbA1C in past 3 or 6 months    If HgbA1C is 8 or greater, it needs to be on file within the past 3 months.  If less than 8, must be on file within the past 6 months.     Recent Labs   Lab Test 10/24/18  1435   A1C 7.5*            Passed - Patient is age 18 or older       Passed - No active pregnancy on record       Passed - Normal serum creatinine on file in past 12 months    Recent Labs   Lab Test 11/12/18  1450   CR 0.60            Passed - No positive pregnancy test in past 12 months       Passed - Recent (6 mo) or future (30 days) visit within the authorizing provider's specialty    Patient had office visit in the last 6 months or has a visit in the next 30 days with authorizing provider.  See \"Patient Info\" tab in inbasket, or \"Choose Columns\" in Meds & Orders section of the refill encounter.              "

## 2019-01-21 ENCOUNTER — OFFICE VISIT (OUTPATIENT)
Dept: FAMILY MEDICINE | Facility: CLINIC | Age: 49
End: 2019-01-21
Payer: COMMERCIAL

## 2019-01-21 VITALS
WEIGHT: 143 LBS | SYSTOLIC BLOOD PRESSURE: 130 MMHG | OXYGEN SATURATION: 99 % | DIASTOLIC BLOOD PRESSURE: 80 MMHG | BODY MASS INDEX: 28.07 KG/M2 | TEMPERATURE: 97.1 F | RESPIRATION RATE: 16 BRPM | HEART RATE: 99 BPM | HEIGHT: 60 IN

## 2019-01-21 DIAGNOSIS — R05.9 COUGH: ICD-10-CM

## 2019-01-21 DIAGNOSIS — I10 HTN, GOAL BELOW 140/90: Primary | ICD-10-CM

## 2019-01-21 DIAGNOSIS — E78.1 HYPERTRIGLYCERIDEMIA: ICD-10-CM

## 2019-01-21 DIAGNOSIS — R80.9 TYPE 2 DIABETES MELLITUS WITH MICROALBUMINURIA, WITHOUT LONG-TERM CURRENT USE OF INSULIN (H): ICD-10-CM

## 2019-01-21 DIAGNOSIS — E78.5 HYPERLIPIDEMIA LDL GOAL <100: ICD-10-CM

## 2019-01-21 DIAGNOSIS — E11.29 TYPE 2 DIABETES MELLITUS WITH MICROALBUMINURIA, WITHOUT LONG-TERM CURRENT USE OF INSULIN (H): ICD-10-CM

## 2019-01-21 DIAGNOSIS — Z12.31 VISIT FOR SCREENING MAMMOGRAM: ICD-10-CM

## 2019-01-21 LAB
ALBUMIN SERPL-MCNC: 4.3 G/DL (ref 3.4–5)
ALP SERPL-CCNC: 60 U/L (ref 40–150)
ALT SERPL W P-5'-P-CCNC: 54 U/L (ref 0–50)
ANION GAP SERPL CALCULATED.3IONS-SCNC: 7 MMOL/L (ref 3–14)
AST SERPL W P-5'-P-CCNC: 30 U/L (ref 0–45)
BILIRUB SERPL-MCNC: 0.4 MG/DL (ref 0.2–1.3)
BUN SERPL-MCNC: 12 MG/DL (ref 7–30)
CALCIUM SERPL-MCNC: 9.3 MG/DL (ref 8.5–10.1)
CHLORIDE SERPL-SCNC: 102 MMOL/L (ref 94–109)
CHOLEST SERPL-MCNC: 184 MG/DL
CO2 SERPL-SCNC: 27 MMOL/L (ref 20–32)
CREAT SERPL-MCNC: 0.61 MG/DL (ref 0.52–1.04)
CREAT UR-MCNC: 81 MG/DL
GFR SERPL CREATININE-BSD FRML MDRD: >90 ML/MIN/{1.73_M2}
GLUCOSE SERPL-MCNC: 152 MG/DL (ref 70–99)
HBA1C MFR BLD: 6.9 % (ref 0–5.6)
HDLC SERPL-MCNC: 45 MG/DL
LDLC SERPL CALC-MCNC: 83 MG/DL
MICROALBUMIN UR-MCNC: 11 MG/L
MICROALBUMIN/CREAT UR: 13.38 MG/G CR (ref 0–25)
NONHDLC SERPL-MCNC: 139 MG/DL
POTASSIUM SERPL-SCNC: 3.9 MMOL/L (ref 3.4–5.3)
PROT SERPL-MCNC: 7.9 G/DL (ref 6.8–8.8)
SODIUM SERPL-SCNC: 136 MMOL/L (ref 133–144)
TRIGL SERPL-MCNC: 280 MG/DL

## 2019-01-21 PROCEDURE — 99207 C FOOT EXAM  NO CHARGE: CPT | Performed by: FAMILY MEDICINE

## 2019-01-21 PROCEDURE — 99214 OFFICE O/P EST MOD 30 MIN: CPT | Performed by: FAMILY MEDICINE

## 2019-01-21 PROCEDURE — 36415 COLL VENOUS BLD VENIPUNCTURE: CPT | Performed by: FAMILY MEDICINE

## 2019-01-21 PROCEDURE — 80061 LIPID PANEL: CPT | Performed by: FAMILY MEDICINE

## 2019-01-21 PROCEDURE — 80053 COMPREHEN METABOLIC PANEL: CPT | Performed by: FAMILY MEDICINE

## 2019-01-21 PROCEDURE — 82043 UR ALBUMIN QUANTITATIVE: CPT | Performed by: FAMILY MEDICINE

## 2019-01-21 PROCEDURE — 83036 HEMOGLOBIN GLYCOSYLATED A1C: CPT | Performed by: FAMILY MEDICINE

## 2019-01-21 RX ORDER — LOSARTAN POTASSIUM 25 MG/1
25 TABLET ORAL DAILY
Qty: 90 TABLET | Refills: 1 | Status: SHIPPED | OUTPATIENT
Start: 2019-01-21 | End: 2019-08-09

## 2019-01-21 RX ORDER — METFORMIN HCL 500 MG
1000 TABLET, EXTENDED RELEASE 24 HR ORAL 2 TIMES DAILY WITH MEALS
Qty: 360 TABLET | Refills: 0 | Status: SHIPPED | OUTPATIENT
Start: 2019-01-21 | End: 2019-08-02

## 2019-01-21 RX ORDER — SIMVASTATIN 40 MG
40 TABLET ORAL AT BEDTIME
Qty: 90 TABLET | Refills: 3 | Status: SHIPPED | OUTPATIENT
Start: 2019-01-21 | End: 2019-08-09

## 2019-01-21 ASSESSMENT — MIFFLIN-ST. JEOR: SCORE: 1182.2

## 2019-01-21 NOTE — PROGRESS NOTES
"  SUBJECTIVE:   May Wade is a 48 year old female who presents to clinic today for the following health issues:      Diabetes Follow-up    Patient is checking blood sugars: 1-2 times daily.    Blood sugar testing frequency justification: Patient modifying lifestyle changes (diet, exercise) with blood sugars  Results are as follows:         am - 100-110         suppertime - 100-120    Diabetic concerns: None     Symptoms of hypoglycemia (low blood sugar): none     Paresthesias (numbness or burning in feet) or sores: No     Date of last diabetic eye exam: lasix this last summer.    Lots of urination with the jardiance.  Drinks a lot of water.     BP Readings from Last 2 Encounters:   01/21/19 130/80   11/12/18 110/80     Hemoglobin A1C (%)   Date Value   10/24/2018 7.5 (H)   05/30/2018 6.7 (H)     LDL Cholesterol Calculated (mg/dL)   Date Value   02/06/2018 33   02/13/2014 43     LDL Cholesterol Direct (mg/dL)   Date Value   10/17/2016 56   10/19/2015 67       Diabetes Management Resources  Hyperlipidemia Follow-Up      Rate your low fat/cholesterol diet?: good    Taking statin?  Yes, no muscle aches from statin    Other lipid medications/supplements?:  Fish oil/Omega 3, dose  without side effects      Amount of exercise or physical activity: 2-3 days/week for an average of greater than 60 minutes    Problems taking medications regularly: No    Medication side effects: none    Diet: low carb    /80 (BP Location: Right arm, Patient Position: Chair, Cuff Size: Adult Regular)   Pulse 99   Temp 97.1  F (36.2  C) (Oral)   Resp 16   Ht 1.511 m (4' 11.5\")   Wt 64.9 kg (143 lb)   LMP 01/21/2012 (Approximate)   SpO2 99%   BMI 28.40 kg/m    EXAM: GENERAL APPEARANCE: Alert, no acute distress  RESP: lungs clear to auscultation   CV: normal rate, regular rhythm, no murmur or gallop  ABDOMEN: soft, no organomegaly, masses or tenderness  MS: extremities normal, no peripheral edema  PSYCH: mentation appears " normal., affect and mood normal    Diabetic Foot Screen:  Any complaints of increased pain or numbness ? No  Is there a foot ulcer now or a history of foot ulcer? No  Does the foot have an abnormal shape? No  Are the nails thick, too long or ingrown? No  Are there any redness or open areas? No         Sensation Testing done at all points on the diagram with monofilament     Right Foot: Sensation Normal at all points  Left Foot: Sensation Normal at all points     Risk Category: 0- No loss of protective sensation  Performed by Julia Reyna MD      ASSESSMENT/PLAN:      ICD-10-CM    1. HTN, goal below 140/90 I10 Comprehensive metabolic panel   2. Type 2 diabetes mellitus with microalbuminuria, without long-term current use of insulin (H) E11.29 Albumin Random Urine Quantitative with Creat Ratio    R80.9 Hemoglobin A1c     Lipid panel reflex to direct LDL Fasting     Comprehensive metabolic panel     FOOT EXAM     empagliflozin (JARDIANCE) 25 MG TABS tablet     metFORMIN (GLUCOPHAGE-XR) 500 MG 24 hr tablet   3. Hyperlipidemia LDL goal <100 E78.5 Lipid panel reflex to direct LDL Fasting     simvastatin (ZOCOR) 40 MG tablet   4. Hypertriglyceridemia E78.1 Lipid panel reflex to direct LDL Fasting   5. Type 2 diabetes mellitus without complication, without long-term current use of insulin (H) E11.9 blood glucose (ACCU-CHEK SANDRA PLUS) test strip     dulaglutide (TRULICITY) 1.5 MG/0.5ML pen   6. Essential hypertension with goal blood pressure less than 140/90 I10 losartan (COZAAR) 25 MG tablet   7. Cough R05 omeprazole (PRILOSEC) 20 MG DR capsule   8. Visit for screening mammogram Z12.31 *MA Screening Digital Bilateral       Patient Instructions       Our Clinic hours are:  Mondays    7:20 am - 7 pm  Tues -  Fri  7:20 am - 5 pm    Clinic Phone: [phone]    The clinic lab opens at 7:30 am Mon - Fri and appointments are required.    [facility]. [phone]  Monday  8 am - 7pm  Tues - Fri 8 am -  5:30 pm

## 2019-01-21 NOTE — PATIENT INSTRUCTIONS
Our Clinic hours are:  Mondays    7:20 am - 7 pm  Tues -  Fri  7:20 am - 5 pm    Clinic Phone: [phone]    The clinic lab opens at 7:30 am Mon - Fri and appointments are required.    [facility]. [phone]  Monday  8 am - 7pm  Tues - Fri 8 am - 5:30 pm

## 2019-01-22 NOTE — RESULT ENCOUNTER NOTE
Please notify pt of normal/acceptable results.  One of the liver enzymes is mildly elevated, this is likely due to the diabetes and fatty liver. Continue to work on lower carbohydrate diet and good diabetes control.    Julia Reyna M.D.

## 2019-02-27 ENCOUNTER — TELEPHONE (OUTPATIENT)
Dept: FAMILY MEDICINE | Facility: CLINIC | Age: 49
End: 2019-02-27

## 2019-02-27 NOTE — TELEPHONE ENCOUNTER
Reason for Call:  Left wrist pain    Detailed comments: patient is calling stating she had wrist Surgery in November and has had no trouble with her left wrist or thump until a week ago, and now she has tremendous pain, which brings her to tears, and it is also swelling. She does have an Ortho Appt. Next Wednesday, but is wondering what to do in the mean time. Very painful. Please advise.    Phone Number Patient can be reached at: Cell number on file:    Telephone Information:   Mobile [phone]       Best Time: any    Can we leave a detailed message on this number? YES   Yaneli Gonzales  Clinic Station [place] [facility]      Call taken on 2/27/2019 at 1:32 PM by Yaneli Gonzales

## 2019-02-27 NOTE — TELEPHONE ENCOUNTER
"S; see note below.   B: called her,   \"I had a cyst removed form my left wrist in the joint kind of and they opened the compartment, and after 2 weeks I was totally fine. \"  \"I had no pain or problems after that. \"    \"prior to the wrist surgery I had cortisone in my thumbs, and they thought the removal of the  cyst would help \"  \"now my right thumb is excruciating pain , if I bump or touch it or grab something, or slam the car door, \"  \"it hurts so bad, \"  \"My left wrist had like a divet in it after surgery and it is swelling slightly again. \"    Hasn't done any shoveling lately.   \"the pain is back and worse than before surgery\"     It is not red, not warm, \"well, slightly more red, maybe along the surgery line. \"  No fever   \"a little lump there too, about an inch below it. \"    \"I went back to my wrist braces so no one touches me. \"    \"I called ortho and they said they would to get me in sooner, and made me an appt for one week. \"  Can move hands and wrist just fine, \"using the thumb is when it hurts\"     She has seen Dr Perez at [facility]  [phone] is the number she has called.   \"I think I will call them back and beg, \"    She is reporting that the pain is \"terrible\" and she is even unable to sleep because of it.   A;Per telephone triage protocols for nurses, fifth edition, [place], hand/ wrist problems guideline, advised her to seek medical care immed/ within 2-4 hours.   Offered ED/ UC as an option. \"I think I will see what ortho recommends, and go from there, thank you. \"    R: Encouraged her to be seen, either at [facility] urgent care or [facility] ED / UC. \"will do , thank you. \"    Mary Magaña RNC            "

## 2019-03-27 ENCOUNTER — ANCILLARY PROCEDURE (OUTPATIENT)
Dept: MAMMOGRAPHY | Facility: CLINIC | Age: 49
End: 2019-03-27
Attending: FAMILY MEDICINE
Payer: COMMERCIAL

## 2019-03-27 DIAGNOSIS — Z12.31 VISIT FOR SCREENING MAMMOGRAM: ICD-10-CM

## 2019-03-27 PROCEDURE — 77067 SCR MAMMO BI INCL CAD: CPT | Mod: TC

## 2019-04-24 ENCOUNTER — TELEPHONE (OUTPATIENT)
Dept: FAMILY MEDICINE | Facility: CLINIC | Age: 49
End: 2019-04-24

## 2019-04-24 DIAGNOSIS — Z12.11 SPECIAL SCREENING FOR MALIGNANT NEOPLASMS, COLON: Primary | ICD-10-CM

## 2019-04-24 NOTE — TELEPHONE ENCOUNTER
Panel Management Review      Patient has the following on her problem list:     Diabetes    ASA: Not Required     Last A1C  Lab Results   Component Value Date    A1C 6.9 01/21/2019    A1C 7.5 10/24/2018    A1C 6.7 05/30/2018    A1C 9.7 02/06/2018    A1C 8.6 10/20/2017     A1C tested: FAILED    Last LDL:    Lab Results   Component Value Date    CHOL 184 01/21/2019     Lab Results   Component Value Date    HDL 45 01/21/2019     Lab Results   Component Value Date    LDL 83 01/21/2019     Lab Results   Component Value Date    TRIG 280 01/21/2019     Lab Results   Component Value Date    CHOLHDLRATIO 5.0 02/13/2014     Lab Results   Component Value Date    NHDL 139 01/21/2019       Is the patient on a Statin? YES             Is the patient on Aspirin? NO    Medications     HMG CoA Reductase Inhibitors     simvastatin (ZOCOR) 40 MG tablet             Last three blood pressure readings:  BP Readings from Last 3 Encounters:   01/21/19 130/80   11/12/18 110/80   08/16/18 102/64       Date of last diabetes office visit: 1/21/19     Tobacco History:     History   Smoking Status     Passive Smoke Exposure - Never Smoker   Smokeless Tobacco     Never Used           Composite cancer screening  Chart review shows that this patient is due/due soon for the following Colonoscopy  Summary:    Patient is due/failing the following:   COLONOSCOPY    Action needed:   Patient needs referral/order: colonoscopy    Type of outreach:    Phone, left message for patient to call back.     Questions for provider review:    None                                                                                                                                    Blanche Ho MA       Chart routed to Care Team .

## 2019-05-09 PROCEDURE — 82274 ASSAY TEST FOR BLOOD FECAL: CPT | Performed by: FAMILY MEDICINE

## 2019-05-12 LAB — HEMOCCULT STL QL IA: POSITIVE

## 2019-05-13 DIAGNOSIS — Z12.11 SPECIAL SCREENING FOR MALIGNANT NEOPLASMS, COLON: ICD-10-CM

## 2019-05-13 DIAGNOSIS — R19.5 POSITIVE FIT (FECAL IMMUNOCHEMICAL TEST): Primary | ICD-10-CM

## 2019-05-30 ENCOUNTER — ANESTHESIA EVENT (OUTPATIENT)
Dept: GASTROENTEROLOGY | Facility: CLINIC | Age: 49
End: 2019-05-30
Payer: COMMERCIAL

## 2019-05-30 NOTE — ANESTHESIA PREPROCEDURE EVALUATION
Anesthesia Pre-Procedure Evaluation    Patient: May Wade   MRN: [id number] : 1970          Preoperative Diagnosis: positive fit test  diagnostic    Procedure(s):  COLONOSCOPY    Past Medical History:   Diagnosis Date     cervical dysplasia     s/p cryo, normal since     Chronic hip pain 10/25/2006    neurontin     gestational diabetes     pregnancy # 2     Migraines     on amitryptiline     Pure hypercholesterolemia     on lipitor     Past Surgical History:   Procedure Laterality Date     CRYOTHERAPY, CERVICAL       HC HYSTEROSCOPY, SURGICAL; W/ ENDOMETRIAL ABLATION, ANY METHOD  08    Novasure     SURGICAL HISTORY OF -   2004    Left wrist dorsal ganglion cyst excision-Removal of the recurrent dorsal boss.     SURGICAL HISTORY OF -       Right Ankle Surgery (Ganglion Cyst)     SURGICAL HISTORY OF -       Breast Reduction     SURGICAL HISTORY OF -       Abdominal Wall Revision     SURGICAL HISTORY OF -   2003    Left Ankle Cyst Removal     SURGICAL HISTORY OF -       Left ankle surgery for ganglion cyst,      SURGICAL HISTORY OF -       Left wrist bone and cyst surgery       Anesthesia Evaluation     . Pt has had prior anesthetic. Type: General and MAC    History of anesthetic complications   - PONV        ROS/MED HX    ENT/Pulmonary:     (+)tobacco use, passive smoker packs/day  , . .    Neurologic:     (+)migraines,     Cardiovascular:     (+) Dyslipidemia, hypertension----. : . . . :. .       METS/Exercise Tolerance:  >4 METS   Hematologic:  - neg hematologic  ROS       Musculoskeletal:   (+) arthritis,  -       GI/Hepatic:     (+) GERD Asymptomatic on medication,       Renal/Genitourinary:  - ROS Renal section negative       Endo:     (+) type II DM Last HgA1c: 6.9 date:  Not using insulin - not using insulin pump Obesity, .      Psychiatric:  - neg psychiatric ROS       Infectious Disease:  - neg infectious disease ROS       Malignancy:      - no malignancy  "  Other:    (+) No chance of pregnancy C-spine cleared: N/A, H/O Chronic Pain,no other significant disability                         Physical Exam  Normal systems: cardiovascular, pulmonary and dental    Airway   Mallampati: I  TM distance: >3 FB  Neck ROM: full    Dental     Cardiovascular       Pulmonary             Lab Results   Component Value Date    WBC 7.2 03/10/2017    HGB 12.8 03/10/2017    HCT 37.8 03/10/2017     03/10/2017     01/21/2019    POTASSIUM 3.9 01/21/2019    CHLORIDE 102 01/21/2019    CO2 27 01/21/2019    BUN 12 01/21/2019    CR 0.61 01/21/2019     (H) 01/21/2019    MARINA 9.3 01/21/2019    ALBUMIN 4.3 01/21/2019    PROTTOTAL 7.9 01/21/2019    ALT 54 (H) 01/21/2019    AST 30 01/21/2019    ALKPHOS 60 01/21/2019    BILITOTAL 0.4 01/21/2019    LIPASE 139 07/22/2009    AMYLASE 64 07/22/2009    TSH 2.98 02/06/2018    HCG  07/02/2010     Negative   This test provides a presumptive diagnosis of pregnancy or non-pregnancy. A   confirmed pregnancy diagnosis should only be made by a physician after all   clinical and laboratory findings have been evaluated.       Preop Vitals  BP Readings from Last 3 Encounters:   01/21/19 130/80   11/12/18 110/80   08/16/18 102/64    Pulse Readings from Last 3 Encounters:   01/21/19 99   11/12/18 80   08/16/18 88      Resp Readings from Last 3 Encounters:   01/21/19 16   11/12/18 16   08/16/18 16    SpO2 Readings from Last 3 Encounters:   01/21/19 99%   11/12/18 96%   06/04/10 95%      Temp Readings from Last 1 Encounters:   01/21/19 36.2  C (97.1  F) (Oral)    Ht Readings from Last 1 Encounters:   01/21/19 1.511 m (4' 11.5\")      Wt Readings from Last 1 Encounters:   01/21/19 64.9 kg (143 lb)    Estimated body mass index is 28.4 kg/m  as calculated from the following:    Height as of 1/21/19: 1.511 m (4' 11.5\").    Weight as of 1/21/19: 64.9 kg (143 lb).       Anesthesia Plan      History & Physical Review  History and physical reviewed and following " examination; no interval change.    ASA Status:  3 .    NPO Status:  > 4 hours    Plan for MAC Maintenance will be Balanced.  Reason for MAC:  Deep or markedly invasive procedure (G8)         Postoperative Care      Consents  Anesthetic plan, risks, benefits and alternatives discussed with:  Patient..                 Alberto Cason, CRNA, APRN CRNA

## 2019-06-03 ENCOUNTER — ANESTHESIA (OUTPATIENT)
Dept: GASTROENTEROLOGY | Facility: CLINIC | Age: 49
End: 2019-06-03
Payer: COMMERCIAL

## 2019-06-03 ENCOUNTER — HOSPITAL ENCOUNTER (OUTPATIENT)
Facility: CLINIC | Age: 49
Discharge: HOME | End: 2019-06-03
Attending: SURGERY | Admitting: SURGERY
Payer: COMMERCIAL

## 2019-06-03 VITALS
HEART RATE: 85 BPM | HEIGHT: 58 IN | RESPIRATION RATE: 20 BRPM | BODY MASS INDEX: 28.34 KG/M2 | WEIGHT: 135 LBS | TEMPERATURE: 98.4 F | DIASTOLIC BLOOD PRESSURE: 78 MMHG | OXYGEN SATURATION: 97 % | SYSTOLIC BLOOD PRESSURE: 120 MMHG

## 2019-06-03 LAB
COLONOSCOPY: NORMAL
GLUCOSE BLDC GLUCOMTR-MCNC: 122 MG/DL (ref 70–99)

## 2019-06-03 PROCEDURE — 25000128 H RX IP 250 OP 636: Performed by: NURSE ANESTHETIST, CERTIFIED REGISTERED

## 2019-06-03 PROCEDURE — 37000008 ZZH ANESTHESIA TECHNICAL FEE, 1ST 30 MIN: Performed by: SURGERY

## 2019-06-03 PROCEDURE — 82962 GLUCOSE BLOOD TEST: CPT

## 2019-06-03 PROCEDURE — 25000125 ZZHC RX 250: Performed by: SURGERY

## 2019-06-03 PROCEDURE — 45385 COLONOSCOPY W/LESION REMOVAL: CPT

## 2019-06-03 PROCEDURE — 45384 COLONOSCOPY W/LESION REMOVAL: CPT | Mod: XU | Performed by: SURGERY

## 2019-06-03 PROCEDURE — 25000125 ZZHC RX 250: Performed by: NURSE ANESTHETIST, CERTIFIED REGISTERED

## 2019-06-03 PROCEDURE — 45385 COLONOSCOPY W/LESION REMOVAL: CPT | Mod: PT | Performed by: SURGERY

## 2019-06-03 PROCEDURE — 88305 TISSUE EXAM BY PATHOLOGIST: CPT | Performed by: SURGERY

## 2019-06-03 PROCEDURE — 45384 COLONOSCOPY W/LESION REMOVAL: CPT | Mod: 59 | Performed by: SURGERY

## 2019-06-03 PROCEDURE — 25800030 ZZH RX IP 258 OP 636: Performed by: SURGERY

## 2019-06-03 PROCEDURE — 45380 COLONOSCOPY AND BIOPSY: CPT | Performed by: SURGERY

## 2019-06-03 PROCEDURE — 88305 TISSUE EXAM BY PATHOLOGIST: CPT | Mod: 26 | Performed by: SURGERY

## 2019-06-03 PROCEDURE — 25000128 H RX IP 250 OP 636: Performed by: SURGERY

## 2019-06-03 RX ORDER — ONDANSETRON 2 MG/ML
4 INJECTION INTRAMUSCULAR; INTRAVENOUS
Status: COMPLETED | OUTPATIENT
Start: 2019-06-03 | End: 2019-06-03

## 2019-06-03 RX ORDER — SODIUM CHLORIDE, SODIUM LACTATE, POTASSIUM CHLORIDE, CALCIUM CHLORIDE 600; 310; 30; 20 MG/100ML; MG/100ML; MG/100ML; MG/100ML
INJECTION, SOLUTION INTRAVENOUS CONTINUOUS
Status: DISCONTINUED | OUTPATIENT
Start: 2019-06-03 | End: 2019-06-03 | Stop reason: HOSPADM

## 2019-06-03 RX ORDER — DEXAMETHASONE SODIUM PHOSPHATE 4 MG/ML
4 INJECTION, SOLUTION INTRA-ARTICULAR; INTRALESIONAL; INTRAMUSCULAR; INTRAVENOUS; SOFT TISSUE ONCE
Status: COMPLETED | OUTPATIENT
Start: 2019-06-03 | End: 2019-06-03

## 2019-06-03 RX ORDER — GLYCOPYRROLATE 0.2 MG/ML
INJECTION, SOLUTION INTRAMUSCULAR; INTRAVENOUS PRN
Status: DISCONTINUED | OUTPATIENT
Start: 2019-06-03 | End: 2019-06-03

## 2019-06-03 RX ORDER — ONDANSETRON 2 MG/ML
INJECTION INTRAMUSCULAR; INTRAVENOUS PRN
Status: DISCONTINUED | OUTPATIENT
Start: 2019-06-03 | End: 2019-06-03

## 2019-06-03 RX ORDER — LIDOCAINE 40 MG/G
CREAM TOPICAL
Status: DISCONTINUED | OUTPATIENT
Start: 2019-06-03 | End: 2019-06-03 | Stop reason: HOSPADM

## 2019-06-03 RX ORDER — PROPOFOL 10 MG/ML
INJECTION, EMULSION INTRAVENOUS CONTINUOUS PRN
Status: DISCONTINUED | OUTPATIENT
Start: 2019-06-03 | End: 2019-06-03

## 2019-06-03 RX ORDER — LIDOCAINE HYDROCHLORIDE 10 MG/ML
INJECTION, SOLUTION INFILTRATION; PERINEURAL PRN
Status: DISCONTINUED | OUTPATIENT
Start: 2019-06-03 | End: 2019-06-03

## 2019-06-03 RX ORDER — PROPOFOL 10 MG/ML
INJECTION, EMULSION INTRAVENOUS PRN
Status: DISCONTINUED | OUTPATIENT
Start: 2019-06-03 | End: 2019-06-03

## 2019-06-03 RX ADMIN — GLYCOPYRROLATE 0.1 MG: 0.2 INJECTION, SOLUTION INTRAMUSCULAR; INTRAVENOUS at 12:27

## 2019-06-03 RX ADMIN — ONDANSETRON 4 MG: 2 INJECTION INTRAMUSCULAR; INTRAVENOUS at 12:29

## 2019-06-03 RX ADMIN — GLYCOPYRROLATE 0.1 MG: 0.2 INJECTION, SOLUTION INTRAMUSCULAR; INTRAVENOUS at 12:28

## 2019-06-03 RX ADMIN — LIDOCAINE HYDROCHLORIDE 30 MG: 10 INJECTION, SOLUTION INFILTRATION; PERINEURAL at 12:28

## 2019-06-03 RX ADMIN — SODIUM CHLORIDE, POTASSIUM CHLORIDE, SODIUM LACTATE AND CALCIUM CHLORIDE: 600; 310; 30; 20 INJECTION, SOLUTION INTRAVENOUS at 12:04

## 2019-06-03 RX ADMIN — PROPOFOL 60 MG: 10 INJECTION, EMULSION INTRAVENOUS at 12:28

## 2019-06-03 RX ADMIN — ONDANSETRON 4 MG: 2 INJECTION INTRAMUSCULAR; INTRAVENOUS at 13:20

## 2019-06-03 RX ADMIN — LIDOCAINE HYDROCHLORIDE 0.3 ML: 10 INJECTION, SOLUTION EPIDURAL; INFILTRATION; INTRACAUDAL; PERINEURAL at 12:05

## 2019-06-03 RX ADMIN — PROPOFOL 200 MCG/KG/MIN: 10 INJECTION, EMULSION INTRAVENOUS at 12:27

## 2019-06-03 RX ADMIN — DEXAMETHASONE SODIUM PHOSPHATE 4 MG: 4 INJECTION, SOLUTION INTRAMUSCULAR; INTRAVENOUS at 13:25

## 2019-06-03 ASSESSMENT — MIFFLIN-ST. JEOR: SCORE: 1122.11

## 2019-06-03 ASSESSMENT — LIFESTYLE VARIABLES: TOBACCO_USE: 1

## 2019-06-03 NOTE — PROGRESS NOTES
Pt had harsh dry heaves post procedure with the passing of large  Amounts of flatus.  Has history of PONV, Zofran given in procedure, orders rec'd for additional dose.  Warm blanket to abdomen, meds given per order, lights out to decrease stimulation.   Continue to monitor.

## 2019-06-03 NOTE — ANESTHESIA CARE TRANSFER NOTE
Patient: May Wade    Procedure(s):  COLONOSCOPY, WITH POLYPECTOMY AND BIOPSY    Diagnosis: positive fit test  diagnostic  Diagnosis Additional Information: No value filed.    Anesthesia Type:   No value filed.     Note:  Airway :Room Air  Patient transferred to:Phase II  Handoff Report: Identifed the Patient, Identified the Reponsible Provider, Reviewed the pertinent medical history, Discussed the surgical course, Reviewed Intra-OP anesthesia mangement and issues during anesthesia, Set expectations for post-procedure period and Allowed opportunity for questions and acknowledgement of understanding      Vitals: (Last set prior to Anesthesia Care Transfer)    CRNA VITALS  6/3/2019 1211 - 6/3/2019 1243      6/3/2019             Pulse:  93    SpO2:  100 %                Electronically Signed By: ASTRID Ross CRNA  Radha 3, 2019  12:43 PM

## 2019-06-03 NOTE — PROGRESS NOTES
Patient refuses urine pregnancy test. States there is no way she could be pregnant. Had an ablation and  had a vasectomy.

## 2019-06-03 NOTE — H&P
"48 year old year old female here for colonoscopy for blood in stool.    Patient Active Problem List   Diagnosis     Hypertriglyceridemia     Hyperlipidemia LDL goal <100     HTN, goal below 140/90     Esophageal reflux     overweight BMI >30     Type 2 diabetes mellitus with microalbuminuria, without long-term current use of insulin (H)       Past Medical History:   Diagnosis Date     cervical dysplasia 1990    s/p cryo, normal since     Chronic hip pain 10/25/2006    neurontin     gestational diabetes     pregnancy # 2     Migraines     on amitryptiline     Pure hypercholesterolemia     on lipitor       Past Surgical History:   Procedure Laterality Date     CRYOTHERAPY, CERVICAL  1990     HC HYSTEROSCOPY, SURGICAL; W/ ENDOMETRIAL ABLATION, ANY METHOD  2/7/08    Novasure     SURGICAL HISTORY OF -   6/17/2004    Left wrist dorsal ganglion cyst excision-Removal of the recurrent dorsal boss.     SURGICAL HISTORY OF -   1994    Right Ankle Surgery (Ganglion Cyst)     SURGICAL HISTORY OF -       Breast Reduction     SURGICAL HISTORY OF -       Abdominal Wall Revision     SURGICAL HISTORY OF -   5/22/2003    Left Ankle Cyst Removal     SURGICAL HISTORY OF -   2001    Left ankle surgery for ganglion cyst,      SURGICAL HISTORY OF -       Left wrist bone and cyst surgery       [facility]    No current outpatient medications on file.       Allergies   Allergen Reactions     Penicillins Nausea and Vomiting     Aspartame Other (See Comments)     Severe pain, passing out       Pt reports that she is a non-smoker but has been exposed to tobacco smoke. She has never used smokeless tobacco. She reports that she drinks alcohol. She reports that she does not use drugs.    Exam:  BP (!) 127/96 (BP Location: Right arm)   Pulse 107   Temp 98.4  F (36.9  C) (Oral)   Resp 20   Ht 1.473 m (4' 10\")   Wt 61.2 kg (135 lb)   SpO2 96%   BMI 28.22 kg/m      Awake, Alert OX3  Lungs - CTA bilaterally  CV - RRR, no murmurs, distal pulses " intact  Abd - soft, non-distended, non-tender, +BS  Extr - No cyanosis or edema    A/P 48 year old year old female in need of colonoscopy for blood in stool. Risks, benefits, alternatives, and complications were discussed including the possibility of perforation and the patient agreed to proceed.    Live Cao MD

## 2019-06-03 NOTE — ANESTHESIA POSTPROCEDURE EVALUATION
Patient: May Wade    Procedure(s):  COLONOSCOPY    Diagnosis:positive fit test  diagnostic  Diagnosis Additional Information: No value filed.    Anesthesia Type:  No value filed.    Note:  Anesthesia Post Evaluation    Patient location during evaluation: Phase 2  Patient participation: Able to fully participate in evaluation  Level of consciousness: awake  Pain management: adequate  Airway patency: patent  Cardiovascular status: acceptable and hemodynamically stable  Respiratory status: acceptable, room air and spontaneous ventilation  Hydration status: acceptable  PONV: none     Anesthetic complications: None          Last vitals:  Vitals:    06/03/19 1134   BP: (!) 127/96   Pulse: 107   Resp: 20   Temp: 36.9  C (98.4  F)   SpO2: 96%         Electronically Signed By: ASTRID Ross CRNA  Radha 3, 2019  12:32 PM

## 2019-06-06 LAB — COPATH REPORT: NORMAL

## 2019-08-02 ENCOUNTER — TELEPHONE (OUTPATIENT)
Dept: FAMILY MEDICINE | Facility: CLINIC | Age: 49
End: 2019-08-02

## 2019-08-02 DIAGNOSIS — E11.29 TYPE 2 DIABETES MELLITUS WITH MICROALBUMINURIA, WITHOUT LONG-TERM CURRENT USE OF INSULIN (H): ICD-10-CM

## 2019-08-02 DIAGNOSIS — R80.9 TYPE 2 DIABETES MELLITUS WITH MICROALBUMINURIA, WITHOUT LONG-TERM CURRENT USE OF INSULIN (H): ICD-10-CM

## 2019-08-02 RX ORDER — METFORMIN HCL 500 MG
TABLET, EXTENDED RELEASE 24 HR ORAL
Qty: 120 TABLET | Refills: 0 | Status: SHIPPED | OUTPATIENT
Start: 2019-08-02 | End: 2019-08-09

## 2019-08-02 RX ORDER — EMPAGLIFLOZIN 25 MG/1
TABLET, FILM COATED ORAL
Qty: 30 TABLET | Refills: 0 | Status: SHIPPED | OUTPATIENT
Start: 2019-08-02 | End: 2019-08-09

## 2019-08-02 NOTE — LETTER
[facility]  [address]  [facility] [address]  [phone]        August 2, 2019  May Wade  [address] [facility]  [place] [address]    Dear May,    I care about your health and have reviewed your health plan. I have reviewed your medical conditions, medication list, and lab results and am making recommendations based on this review, to better manage your health.    You are in particular need of attention regarding:  -Diabetes    I am recommending that you:  -schedule a FOLLOWUP OFFICE APPOINTMENT with me.  I will recheck your: A1c test.  Here is a list of Health Maintenance topics that are due now or due soon:  Health Maintenance Due   Topic Date Due     EYE EXAM  1970     HIV SCREENING  12/12/1983     PREVENTIVE CARE VISIT  02/28/2017     A1C  07/21/2019     ZOSTER IMMUNIZATION (1 of 2) 12/12/2018     Please call us at [phone] (or use QVPN) to address the above recommendations.     Thank you for trusting [facility] and we appreciate the opportunity to serve you.  We look forward to supporting your healthcare needs in the future.    Healthy Regards,    Dr. Julia Reyna/Agnes ROD RN

## 2019-08-02 NOTE — TELEPHONE ENCOUNTER
"Requested Prescriptions   Pending Prescriptions Disp Refills     metFORMIN (GLUCOPHAGE-XR) 500 MG 24 hr tablet [Pharmacy Med Name: METFORMIN HCL ER 500MG TB24] 360 tablet 0     Sig: TAKE TWO TABLETS BY MOUTH TWICE A DAY WITH MEALS       Biguanide Agents Failed - 8/2/2019  6:55 AM        Failed - Patient has documented A1c within the specified period of time.     If HgbA1C is 8 or greater, it needs to be on file within the past 3 months.  If less than 8, must be on file within the past 6 months.     Recent Labs   Lab Test 01/21/19  1416   A1C 6.9*             Failed - Recent (6 mo) or future (30 days) visit within the authorizing provider's specialty     Patient had office visit in the last 6 months or has a visit in the next 30 days with authorizing provider or within the authorizing provider's specialty.  See \"Patient Info\" tab in inbasket, or \"Choose Columns\" in Meds & Orders section of the refill encounter.            Passed - Blood pressure less than 140/90 in past 6 months     BP Readings from Last 3 Encounters:   06/03/19 120/78   01/21/19 130/80   11/12/18 110/80                 Passed - Patient has documented LDL within the past 12 mos.     Recent Labs   Lab Test 01/21/19  1416   LDL 83             Passed - Patient has had a Microalbumin in the past 15 mos.     Recent Labs   Lab Test 01/21/19  1459   MICROL 11   UMALCR 13.38             Passed - Patient is age 10 or older        Passed - Patient's CR is NOT>1.4 OR Patient's EGFR is NOT<45 within past 12 mos.     Recent Labs   Lab Test 01/21/19  1416   GFRESTIMATED >90   GFRESTBLACK >90       Recent Labs   Lab Test 01/21/19  1416   CR 0.61             Passed - Patient does NOT have a diagnosis of CHF.        Passed - Medication is active on med list        Passed - Patient is not pregnant        Passed - Patient has not had a positive pregnancy test within the past 12 mos.    Last Written Prescription Date:  1/21/19  Last Fill Quantity: 360,  # refills: 0 " "  Last office visit: 1/21/2019 with prescribing provider:  Axel   Future Office Visit:         JARDIANCE 25 MG TABS tablet [Pharmacy Med Name: JARDIANCE 25MG TABS] 90 tablet 1     Sig: TAKE ONE TABLET BY MOUTH ONCE DAILY       Sodium Glucose Co-Transport Inhibitor Agents Failed - 8/2/2019  6:55 AM        Failed - Patient has documented A1c within the specified period of time.     If HgbA1C is 8 or greater, it needs to be on file within the past 3 months.  If less than 8, must be on file within the past 6 months.     Recent Labs   Lab Test 01/21/19  1416   A1C 6.9*             Failed - Recent (6 mo) or future (30 days) visit within the authorizing provider's specialty     Patient had office visit in the last 6 months or has a visit in the next 30 days with authorizing provider or within the authorizing provider's specialty.  See \"Patient Info\" tab in inbasket, or \"Choose Columns\" in Meds & Orders section of the refill encounter.            Passed - Blood pressure less than 140/90 in past 6 months     BP Readings from Last 3 Encounters:   06/03/19 120/78   01/21/19 130/80   11/12/18 110/80                 Passed - Patient has documented LDL within the past 12 mos.     Recent Labs   Lab Test 01/21/19  1416   LDL 83             Passed - Patient has had a Microalbumin in the past 15 mos.     Recent Labs   Lab Test 01/21/19  1459   MICROL 11   UMALCR 13.38             Passed - No creatinine >1.4 or GFR <45 within the past 12 mos     Recent Labs   Lab Test 01/21/19  1416   GFRESTIMATED >90   GFRESTBLACK >90       Recent Labs   Lab Test 01/21/19  1416   CR 0.61             Passed - Medication is active on med list        Passed - Patient is age 18 or older        Passed - Patient is not pregnant        Passed - Patient has documented normal Potassium within the last 12 mos.     Recent Labs   Lab Test 01/21/19  1416   POTASSIUM 3.9             Passed - Patient has no positive pregnancy test within the past 12 mos.    "     Last Written Prescription Date:  1/21/19  Last Fill Quantity: 90,  # refills: 1   Last office visit: 1/21/2019 with prescribing provider:  Axel   Future Office Visit:

## 2019-08-02 NOTE — TELEPHONE ENCOUNTER
I left a message for the patient to return my call.  CSS please let know she needs appt. Last OV 1/21/19: Return in about 6 months for diabetes recheck.   Medications are being filled for 1 time refill only due to:  Patient needs to be seen because due for appt.Letter also mailed to pt.    Agnes ROD RN

## 2019-08-02 NOTE — TELEPHONE ENCOUNTER
Aug 09, 2019  1:20 PM CDT  Office Visit with ASTRID Islas CNP  [facility] ([facility]) [address]  [facility] [address] [phone]         Has future appt, needs refill until then.     Nannette KENDALL  Station

## 2019-08-09 ENCOUNTER — OFFICE VISIT (OUTPATIENT)
Dept: FAMILY MEDICINE | Facility: CLINIC | Age: 49
End: 2019-08-09
Payer: COMMERCIAL

## 2019-08-09 VITALS
HEART RATE: 98 BPM | HEIGHT: 58 IN | BODY MASS INDEX: 29.01 KG/M2 | TEMPERATURE: 98.3 F | SYSTOLIC BLOOD PRESSURE: 114 MMHG | WEIGHT: 138.2 LBS | RESPIRATION RATE: 16 BRPM | OXYGEN SATURATION: 98 % | DIASTOLIC BLOOD PRESSURE: 82 MMHG

## 2019-08-09 DIAGNOSIS — E11.29 TYPE 2 DIABETES MELLITUS WITH MICROALBUMINURIA, WITHOUT LONG-TERM CURRENT USE OF INSULIN (H): ICD-10-CM

## 2019-08-09 DIAGNOSIS — R05.9 COUGH: ICD-10-CM

## 2019-08-09 DIAGNOSIS — I10 HTN, GOAL BELOW 140/90: ICD-10-CM

## 2019-08-09 DIAGNOSIS — R80.9 TYPE 2 DIABETES MELLITUS WITH MICROALBUMINURIA, WITHOUT LONG-TERM CURRENT USE OF INSULIN (H): ICD-10-CM

## 2019-08-09 DIAGNOSIS — E78.5 HYPERLIPIDEMIA LDL GOAL <100: ICD-10-CM

## 2019-08-09 LAB
ANION GAP SERPL CALCULATED.3IONS-SCNC: 8 MMOL/L (ref 3–14)
BUN SERPL-MCNC: 11 MG/DL (ref 7–30)
CALCIUM SERPL-MCNC: 9.4 MG/DL (ref 8.5–10.1)
CHLORIDE SERPL-SCNC: 100 MMOL/L (ref 94–109)
CO2 SERPL-SCNC: 26 MMOL/L (ref 20–32)
CREAT SERPL-MCNC: 0.61 MG/DL (ref 0.52–1.04)
GFR SERPL CREATININE-BSD FRML MDRD: >90 ML/MIN/{1.73_M2}
GLUCOSE SERPL-MCNC: 283 MG/DL (ref 70–99)
HBA1C MFR BLD: 6.6 % (ref 0–5.6)
POTASSIUM SERPL-SCNC: 3.8 MMOL/L (ref 3.4–5.3)
SODIUM SERPL-SCNC: 134 MMOL/L (ref 133–144)

## 2019-08-09 PROCEDURE — 99214 OFFICE O/P EST MOD 30 MIN: CPT | Performed by: NURSE PRACTITIONER

## 2019-08-09 PROCEDURE — 80048 BASIC METABOLIC PNL TOTAL CA: CPT | Performed by: NURSE PRACTITIONER

## 2019-08-09 PROCEDURE — 83036 HEMOGLOBIN GLYCOSYLATED A1C: CPT | Performed by: NURSE PRACTITIONER

## 2019-08-09 PROCEDURE — 36415 COLL VENOUS BLD VENIPUNCTURE: CPT | Performed by: NURSE PRACTITIONER

## 2019-08-09 RX ORDER — LOSARTAN POTASSIUM 25 MG/1
25 TABLET ORAL DAILY
Qty: 90 TABLET | Refills: 1 | Status: SHIPPED | OUTPATIENT
Start: 2019-08-09 | End: 2020-02-14

## 2019-08-09 RX ORDER — SIMVASTATIN 40 MG
40 TABLET ORAL AT BEDTIME
Qty: 90 TABLET | Refills: 3 | Status: SHIPPED | OUTPATIENT
Start: 2019-08-09 | End: 2020-09-14

## 2019-08-09 RX ORDER — METFORMIN HCL 500 MG
TABLET, EXTENDED RELEASE 24 HR ORAL
Qty: 180 TABLET | Refills: 1 | Status: SHIPPED | OUTPATIENT
Start: 2019-08-09 | End: 2019-12-12

## 2019-08-09 ASSESSMENT — MIFFLIN-ST. JEOR: SCORE: 1136.62

## 2019-08-09 NOTE — LETTER
[facility]  [address]  [facility] [address]  Phone: [phone]      8/12/2019     May Wade  [address] [facility]  [place] [address]      Dear May:    Thank you for allowing me to participate in your care. Your recent test results were reviewed and listed below.  Glucose is elevated. A1C is at goal ( < 7), decreased some from last check, great management of that! Kidney function and rest of electrolytes are normal.     Your results are provided below for your review  Results for orders placed or performed in visit on 08/09/19   Basic metabolic panel  (Ca, Cl, CO2, Creat, Gluc, K, Na, BUN)   Result Value Ref Range    Sodium 134 133 - 144 mmol/L    Potassium 3.8 3.4 - 5.3 mmol/L    Chloride 100 94 - 109 mmol/L    Carbon Dioxide 26 20 - 32 mmol/L    Anion Gap 8 3 - 14 mmol/L    Glucose 283 (H) 70 - 99 mg/dL    Urea Nitrogen 11 7 - 30 mg/dL    Creatinine 0.61 0.52 - 1.04 mg/dL    GFR Estimate >90 >60 mL/min/[1.73_m2]    GFR Estimate If Black >90 >60 mL/min/[1.73_m2]    Calcium 9.4 8.5 - 10.1 mg/dL   Hemoglobin A1c   Result Value Ref Range    Hemoglobin A1C 6.6 (H) 0 - 5.6 %   Thank you for choosing [facility]. As a result, please continue with the treatment plan discussed in the office. Return as discussed or sooner if symptoms worsen or fail to improve.     If you have any further questions or concerns, please do not hesitate to contact us.      Sincerely,        ASTRID Pascual CNP

## 2019-08-09 NOTE — PROGRESS NOTES
Subjective     May Wade is a 48 year old female who presents to clinic today for the following health issues:    HPI   Diabetes Follow-up      How often are you checking your blood sugar? One time daily    What time of day are you checking your blood sugars (select all that apply)?  - in the am before breakfast     Have you had any blood sugars above 200?  No    Have you had any blood sugars below 70?  No    What symptoms do you notice when your blood sugar is low?  None    What concerns do you have today about your diabetes? None     Do you have any of these symptoms? (Select all that apply)  Weight loss     Have you had a diabetic eye exam in the last 12 months? Yes- Date of last eye exam: unsure      Health Maintenance reviewed - patient asked to schedule diabetic eye exam.      Diabetes Management Resources    Hyperlipidemia Follow-Up      Are you having any of the following symptoms? (Select all that apply)  No complaints of shortness of breath, chest pain or pressure.  No increased sweating or nausea with activity.  No left-sided neck or arm pain.  No complaints of pain in calves when walking 1-2 blocks.    Are you regularly taking any medication or supplement to lower your cholesterol?   Yes- simvastatin and fish oil     Are you having muscle aches or other side effects that you think could be caused by your cholesterol lowering medication?  No    Hypertension Follow-up      Do you check your blood pressure regularly outside of the clinic? No     Are you following a low salt diet? Yes    Are your blood pressures ever more than 140 on the top number (systolic) OR more   than 90 on the bottom number (diastolic), for example 140/90? Not checked but always normal in clinic     BP Readings from Last 2 Encounters:   08/09/19 114/82   06/03/19 120/78     Hemoglobin A1C (%)   Date Value   08/09/2019 6.6 (H)   01/21/2019 6.9 (H)     LDL Cholesterol Calculated (mg/dL)   Date Value   01/21/2019 83   02/06/2018 33          How many servings of fruits and vegetables do you eat daily?  4 or more    On average, how many sweetened beverages do you drink each day (soda, juice, sweet tea, etc)?   None     How many days per week do you miss taking your medication? 0      Patient Active Problem List   Diagnosis     Hypertriglyceridemia     Hyperlipidemia LDL goal <100     HTN, goal below 140/90     Esophageal reflux     overweight BMI >30     Type 2 diabetes mellitus with microalbuminuria, without long-term current use of insulin (H)     Past Surgical History:   Procedure Laterality Date     COLONOSCOPY N/A 6/3/2019    Procedure: COLONOSCOPY, WITH POLYPECTOMY AND BIOPSY;  Surgeon: Live Cao MD;  Location: [facility]     CRYOTHERAPY, CERVICAL       HC HYSTEROSCOPY, SURGICAL; W/ ENDOMETRIAL ABLATION, ANY METHOD  08    Novasure     SURGICAL HISTORY OF -   2004    Left wrist dorsal ganglion cyst excision-Removal of the recurrent dorsal boss.     SURGICAL HISTORY OF -       Right Ankle Surgery (Ganglion Cyst)     SURGICAL HISTORY OF -       Breast Reduction     SURGICAL HISTORY OF -       Abdominal Wall Revision     SURGICAL HISTORY OF -   2003    Left Ankle Cyst Removal     SURGICAL HISTORY OF -       Left ankle surgery for ganglion cyst,      SURGICAL HISTORY OF -       Left wrist bone and cyst surgery       Social History     Tobacco Use     Smoking status: Passive Smoke Exposure - Never Smoker     Smokeless tobacco: Never Used   Substance Use Topics     Alcohol use: Yes     Comment: 1 drink every 2 weeks     Family History   Problem Relation Age of Onset     C.A.D. Father          age 51     Respiratory Father         smoker     Cerebrovascular Disease Maternal Grandmother      Hypertension Maternal Grandmother      C.A.D. Paternal Grandfather      Asthma No family hx of      Diabetes No family hx of      Breast Cancer No family hx of      Cancer - colorectal No family hx of      Prostate Cancer No family  "hx of              Reviewed and updated as needed this visit by Provider         Review of Systems   ROS COMP: Constitutional, HEENT, cardiovascular, pulmonary, gi and gu systems are negative, except as otherwise noted.      Objective    /82 (BP Location: Right arm, Patient Position: Sitting, Cuff Size: Adult Regular)   Pulse 98   Temp 98.3  F (36.8  C) (Tympanic)   Resp 16   Ht 1.473 m (4' 10\")   Wt 62.7 kg (138 lb 3.2 oz)   SpO2 98%   BMI 28.88 kg/m    Body mass index is 28.88 kg/m .  Physical Exam   GENERAL: healthy, alert and no distress  NECK: no adenopathy, no asymmetry, masses, or scars and thyroid normal to palpation  RESP: lungs clear to auscultation - no rales, rhonchi or wheezes  CV: regular rate and rhythm, normal S1 S2, no S3 or S4, no murmur, click or rub, no peripheral edema and peripheral pulses strong  MS: no gross musculoskeletal defects noted, no edema  NEURO: Normal strength and tone, mentation intact and speech normal    Diagnostic Test Results:  Labs reviewed in Epic  Results for orders placed or performed in visit on 08/09/19 (from the past 24 hour(s))   Hemoglobin A1c   Result Value Ref Range    Hemoglobin A1C 6.6 (H) 0 - 5.6 %           Assessment & Plan       ICD-10-CM    1. Type 2 diabetes mellitus with microalbuminuria, without long-term current use of insulin (H) E11.29 empagliflozin (JARDIANCE) 25 MG TABS tablet    R80.9 metFORMIN (GLUCOPHAGE-XR) 500 MG 24 hr tablet     dulaglutide (TRULICITY) 1.5 MG/0.5ML pen     losartan (COZAAR) 25 MG tablet     Hemoglobin A1c   2. HTN, goal below 140/90 I10 losartan (COZAAR) 25 MG tablet     Basic metabolic panel  (Ca, Cl, CO2, Creat, Gluc, K, Na, BUN)   3. Hyperlipidemia LDL goal <100 E78.5 simvastatin (ZOCOR) 40 MG tablet   4. Cough R05 omeprazole (PRILOSEC) 20 MG DR capsule        BMI:   Estimated body mass index is 28.88 kg/m  as calculated from the following:    Height as of this encounter: 1.473 m (4' 10\").    Weight as of this " encounter: 62.7 kg (138 lb 3.2 oz).   Weight management plan: Discussed healthy diet and exercise guidelines        FUTURE APPOINTMENTS:       - Follow-up visit in 6 months.     Patient Instructions       Patient Education     Managing Diabetes: The A1C Test       Healthy red blood cells have some glucose stuck to them. A high A1C means that unhealthy amounts of glucose are stuck to the cells.   What is the A1C test?  Using your meter helps you track your blood sugar every day. But your glucose meter tells you the value at the time of testing only. You also need to know if your treatment plan is keeping you healthy over time. The hemoglobin A1C (or glycated hemoglobin) test can help. This test measures your average blood sugar level over a few months. A higher A1C result means that you have a higher risk of developing complications.  The A1C test  The A1C is a blood test done by your healthcare provider. You will likely have an A1C test every 3 to 6 months.  Your blood glucose goal  A1C has been shown as a percentage. But it can also be shown as a number representing the estimated Average Glucose (eAG). Unlike the A1C percentage, eAG is a number similar to the numbers listed on your daily glucose monitor. Both A1C and eAG measure the amount of glucose stuck to a protein called hemoglobin in red blood cells. Your healthcare provider will help you figure out what your ideal A1C or eAG should be. Your target number will depend on your age, general health, and other factors. If your current number is too high, your treatment plan may need changes, such as different medicines.  Sample results  Most people aim for an A1c lower than 7%. That s an eAG less than 154 mg/dL. Or, your healthcare provider may want you to aim for an A1C of 6%. That s an eAG of 126 mg/dL.     Glucose calculator  Visit http://professional.diabetes.org/diapro/glucose_calc for a chart that helps convert your A1C percentages into eAG numbers.   Date  Last Reviewed: 6/1/2016 2000-2018 [employer] [address] [place] [address], [place] [address]. All rights reserved. This information is not intended as a substitute for professional medical care. Always follow your healthcare professional's instructions.           Patient Education     Diabetic Foot Care  Diabetes can lead to a number of foot complications. Fortunately, you can prevent most of these with a little extra foot care. If diabetes is not well controlled, it can cause damage to blood vessels and result in poor circulation to the foot. When the skin does not get enough blood flow, it becomes prone to pressure sores and ulcers, which heal slowly.  Diabetes can also damage nerves, interfering with the ability to feel pain and pressure. When you can t feel your foot normally, it is easy to injure your skin, bones, and joints without knowing it. For these reasons diabetes increases the risk of fungal infections, bunions, and ulcers. An ulcer is a sore or break in the skin. With ulcers, often the skin seems to have worn away. Deep ulcers can lead to bone infection.  Gangrene is the most serious foot complication of diabetes. It usually occurs on the tips of the toes as blackened areas of skin. The black area is dead tissue. In severe cases, gangrene spreads to involve the entire toe, other toes, and the entire foot. Foot or toe amputation may be required. Good foot care and blood sugar control can prevent this.  Home care    Wear comfortable, well-fitting shoes.    Wash your feet daily with warm water and mild soap.    After drying, apply a moisturizing cream or lotion to the top and bottom of your feet. Don't put lotion between toes.    Check your feet daily for skin breaks, blisters, swelling, or redness. Look between your toes as well. If you cannot see the bottoms of your feet, ask someone to look or use a mirror.    Wear cotton socks and change them every day.    Trim toenails carefully, and do  not cut your cuticles.    Strive to keep your blood sugar under control with a combination of medicines, diet, and activity.    If you smoke and have diabetes, it is very important that you stop. Smoking reduces blood flow to your foot.    Schedule foot exams at least every year, or more often if you have foot problems.    Put your feet up when sitting, wiggle toes, and move ankles to help improve blood flow.  Avoid activities that increase your risk of foot injury:    Do not walk barefoot.    Do not use heating pads or hot water bottles on your feet.    Do not put your foot in a hot tub without first checking the temperature with your hand.  Follow-up care  Follow up with your healthcare provider, or as advised. Be sure to take off your shoes and socks before your appointment starts so your healthcare provider will be sure to check your feet. Report any cut, puncture, scrape, blister, or other injury to your foot. Also report if you have a bunion, hammertoes, ingrown toenail, or ulcer on your foot.  When to seek medical advice  Call your healthcare provider right away if any of these occur:    Black skin color anywhere on the foot    Open ulcer with pus draining from the wound    Increasing foot or leg pain    New areas of redness or swelling or tender areas of the foot    Fever of 100.4 F (38 C) or greater  Date Last Reviewed: 5/25/2016 2000-2018 [employer]. [address] [place] [address]. All rights reserved. This information is not intended as a substitute for professional medical care. Always follow your healthcare professional's instructions.               No follow-ups on file.    ASTRID Islas [facility]

## 2019-08-09 NOTE — PATIENT INSTRUCTIONS
Patient Education     Managing Diabetes: The A1C Test       Healthy red blood cells have some glucose stuck to them. A high A1C means that unhealthy amounts of glucose are stuck to the cells.   What is the A1C test?  Using your meter helps you track your blood sugar every day. But your glucose meter tells you the value at the time of testing only. You also need to know if your treatment plan is keeping you healthy over time. The hemoglobin A1C (or glycated hemoglobin) test can help. This test measures your average blood sugar level over a few months. A higher A1C result means that you have a higher risk of developing complications.  The A1C test  The A1C is a blood test done by your healthcare provider. You will likely have an A1C test every 3 to 6 months.  Your blood glucose goal  A1C has been shown as a percentage. But it can also be shown as a number representing the estimated Average Glucose (eAG). Unlike the A1C percentage, eAG is a number similar to the numbers listed on your daily glucose monitor. Both A1C and eAG measure the amount of glucose stuck to a protein called hemoglobin in red blood cells. Your healthcare provider will help you figure out what your ideal A1C or eAG should be. Your target number will depend on your age, general health, and other factors. If your current number is too high, your treatment plan may need changes, such as different medicines.  Sample results  Most people aim for an A1c lower than 7%. That s an eAG less than 154 mg/dL. Or, your healthcare provider may want you to aim for an A1C of 6%. That s an eAG of 126 mg/dL.     Glucose calculator  Visit http://professional.diabetes.org/diapro/glucose_calc for a chart that helps convert your A1C percentages into eAG numbers.   Date Last Reviewed: 6/1/2016 2000-2018 [employer]. [address] [place] [address], [place] [address]. All rights reserved. This information is not intended as a substitute for professional medical  care. Always follow your healthcare professional's instructions.           Patient Education     Diabetic Foot Care  Diabetes can lead to a number of foot complications. Fortunately, you can prevent most of these with a little extra foot care. If diabetes is not well controlled, it can cause damage to blood vessels and result in poor circulation to the foot. When the skin does not get enough blood flow, it becomes prone to pressure sores and ulcers, which heal slowly.  Diabetes can also damage nerves, interfering with the ability to feel pain and pressure. When you can t feel your foot normally, it is easy to injure your skin, bones, and joints without knowing it. For these reasons diabetes increases the risk of fungal infections, bunions, and ulcers. An ulcer is a sore or break in the skin. With ulcers, often the skin seems to have worn away. Deep ulcers can lead to bone infection.  Gangrene is the most serious foot complication of diabetes. It usually occurs on the tips of the toes as blackened areas of skin. The black area is dead tissue. In severe cases, gangrene spreads to involve the entire toe, other toes, and the entire foot. Foot or toe amputation may be required. Good foot care and blood sugar control can prevent this.  Home care    Wear comfortable, well-fitting shoes.    Wash your feet daily with warm water and mild soap.    After drying, apply a moisturizing cream or lotion to the top and bottom of your feet. Don't put lotion between toes.    Check your feet daily for skin breaks, blisters, swelling, or redness. Look between your toes as well. If you cannot see the bottoms of your feet, ask someone to look or use a mirror.    Wear cotton socks and change them every day.    Trim toenails carefully, and do not cut your cuticles.    Strive to keep your blood sugar under control with a combination of medicines, diet, and activity.    If you smoke and have diabetes, it is very important that you stop.  Smoking reduces blood flow to your foot.    Schedule foot exams at least every year, or more often if you have foot problems.    Put your feet up when sitting, wiggle toes, and move ankles to help improve blood flow.  Avoid activities that increase your risk of foot injury:    Do not walk barefoot.    Do not use heating pads or hot water bottles on your feet.    Do not put your foot in a hot tub without first checking the temperature with your hand.  Follow-up care  Follow up with your healthcare provider, or as advised. Be sure to take off your shoes and socks before your appointment starts so your healthcare provider will be sure to check your feet. Report any cut, puncture, scrape, blister, or other injury to your foot. Also report if you have a bunion, hammertoes, ingrown toenail, or ulcer on your foot.  When to seek medical advice  Call your healthcare provider right away if any of these occur:    Black skin color anywhere on the foot    Open ulcer with pus draining from the wound    Increasing foot or leg pain    New areas of redness or swelling or tender areas of the foot    Fever of 100.4 F (38 C) or greater  Date Last Reviewed: 5/25/2016 2000-2018 [employer]. [address] [place] [address], [place] [address]. All rights reserved. This information is not intended as a substitute for professional medical care. Always follow your healthcare professional's instructions.

## 2019-08-10 NOTE — RESULT ENCOUNTER NOTE
Please send patient letter notifying of labs and provider message. Glucose is elevated. A1C is at goal ( < 7), decreased some from last check, great management of that! Kidney function and rest of electrolytes are normal.    Thanks,  ASTRID Pascual CNP

## 2019-09-05 ENCOUNTER — OFFICE VISIT (OUTPATIENT)
Dept: FAMILY MEDICINE | Facility: CLINIC | Age: 49
End: 2019-09-05
Payer: COMMERCIAL

## 2019-09-05 VITALS
SYSTOLIC BLOOD PRESSURE: 96 MMHG | TEMPERATURE: 98.2 F | BODY MASS INDEX: 28.76 KG/M2 | WEIGHT: 137 LBS | HEART RATE: 97 BPM | HEIGHT: 58 IN | DIASTOLIC BLOOD PRESSURE: 66 MMHG | OXYGEN SATURATION: 98 % | RESPIRATION RATE: 15 BRPM

## 2019-09-05 DIAGNOSIS — E11.29 TYPE 2 DIABETES MELLITUS WITH MICROALBUMINURIA, WITHOUT LONG-TERM CURRENT USE OF INSULIN (H): ICD-10-CM

## 2019-09-05 DIAGNOSIS — M67.431 GANGLION CYST OF WRIST, RIGHT: ICD-10-CM

## 2019-09-05 DIAGNOSIS — R80.9 TYPE 2 DIABETES MELLITUS WITH MICROALBUMINURIA, WITHOUT LONG-TERM CURRENT USE OF INSULIN (H): ICD-10-CM

## 2019-09-05 DIAGNOSIS — Z01.818 PREOP GENERAL PHYSICAL EXAM: Primary | ICD-10-CM

## 2019-09-05 PROCEDURE — 99214 OFFICE O/P EST MOD 30 MIN: CPT | Performed by: NURSE PRACTITIONER

## 2019-09-05 ASSESSMENT — MIFFLIN-ST. JEOR: SCORE: 1131.18

## 2019-09-05 NOTE — PATIENT INSTRUCTIONS
Before Your Surgery      Call your surgeon if there is any change in your health. This includes signs of a cold or flu (such as a sore throat, runny nose, cough, rash or fever).    Do not smoke, drink alcohol or take over the counter medicine (unless your surgeon or primary care doctor tells you to) for the 24 hours before and after surgery.    If you take prescribed drugs: Follow your doctor s orders about which medicines to take and which to stop until after surgery.    Hold diabetic medications the morning of surgery.    Eating and drinking prior to surgery: follow the instructions from your surgeon    Take a shower or bath the night before surgery. Use the soap your surgeon gave you to gently clean your skin. If you do not have soap from your surgeon, use your regular soap. Do not shave or scrub the surgery site.  Wear clean pajamas and have clean sheets on your bed.

## 2019-09-05 NOTE — PROGRESS NOTES
[facility]– [place]  [address]  [facility] [address]  [phone]  Dept: [phone]    PRE-OP EVALUATION:  Today's date: 2019    May Wade (: 1970) presents for pre-operative evaluation assessment as requested by Dr. Jeong.  She requires evaluation and anesthesia risk assessment prior to undergoing surgery/procedure for treatment of Right Wrist Cyst removal, compartmental release and vein repair.    Proposed Surgery/ Procedure: Cyst removal of right wrist  Date of Surgery/ Procedure: 2019  Time of Surgery/ Procedure: [facility]  Hospital/Surgical Facility: [facility]  Fax number for surgical facility: [phone]  Primary Physician: Julia Reyna  Type of Anesthesia Anticipated: Local with MAC    Patient has a Health Care Directive or Living Will:  NO    1. NO - Do you have a history of heart attack, stroke, stent, bypass or surgery on an artery in the head, neck, heart or legs?  2. NO - Do you ever have any pain or discomfort in your chest?  3. NO - Do you have a history of  Heart Failure?  4. NO - Are you troubled by shortness of breath when: walking on the level, up a slight hill or at night?  5. NO - Do you currently have a cold, bronchitis or other respiratory infection?  6. NO - Do you have a cough, shortness of breath or wheezing?  7. NO - Do you sometimes get pains in the calves of your legs when you walk?  8. NO - Do you or anyone in your family have previous history of blood clots?  9. NO - Do you or does anyone in your family have a serious bleeding problem such as prolonged bleeding following surgeries or cuts?  10. NO - Have you ever had problems with anemia or been told to take iron pills?  11. NO - Have you had any abnormal blood loss such as black, tarry or bloody stools, or abnormal vaginal bleeding?  12. NO - Have you ever had a blood transfusion?  13. NO - Have you or any of your relatives ever had problems with anesthesia?  14. NO - Do  you have sleep apnea, excessive snoring or daytime drowsiness?  15. NO - Do you have any prosthetic heart valves?  16. NO - Do you have prosthetic joints?  17. NO - Is there any chance that you may be pregnant?      HPI:     HPI related to upcoming procedure: Hx of left wrist cyst removal that was not complete which caused her to over use right wrist and now has compartment swelling and cyst in right radial wrist.  She also had damage to vein in this hand with IV start and the surgeon will be repairing this also per patient.      DIABETES - Patient has a longstanding history of DiabetesType Type II . Patient is being treated with diet, oral agents and Trulicity and denies significant side effects. Control has been good. Complicating factors include but are not limited to: hyperlipidemia.     HYPERLIPIDEMIA - Patient has a long history of significant Hyperlipidemia requiring medication for treatment with recent good control. Patient reports no problems or side effects with the medication.       MEDICAL HISTORY:     Patient Active Problem List    Diagnosis Date Noted     Type 2 diabetes mellitus with microalbuminuria, without long-term current use of insulin (H) 02/07/2018     Priority: Medium     overweight BMI >30 10/18/2015     Priority: Medium     Esophageal reflux 02/17/2014     Priority: Medium     HTN, goal below 140/90 12/09/2013     Priority: Medium     Hypertriglyceridemia 10/26/2010     Priority: Medium     Hyperlipidemia LDL goal <100 10/26/2010     Priority: Medium      Past Medical History:   Diagnosis Date     cervical dysplasia 1990    s/p cryo, normal since     Chronic hip pain 10/25/2006    neurontin     gestational diabetes     pregnancy # 2     Migraines     on amitryptiline     Pure hypercholesterolemia     on lipitor     Past Surgical History:   Procedure Laterality Date     COLONOSCOPY N/A 6/3/2019    Procedure: COLONOSCOPY, WITH POLYPECTOMY AND BIOPSY;  Surgeon: Live Cao MD;  Location: [facility]  GI     CRYOTHERAPY, CERVICAL  1990      HYSTEROSCOPY, SURGICAL; W/ ENDOMETRIAL ABLATION, ANY METHOD  2/7/08    [facility]     SURGICAL HISTORY OF -   6/17/2004    Left wrist dorsal ganglion cyst excision-Removal of the recurrent dorsal boss.     SURGICAL HISTORY OF -   1994    Right Ankle Surgery (Ganglion Cyst)     SURGICAL HISTORY OF -       Breast Reduction     SURGICAL HISTORY OF -       Abdominal Wall Revision     SURGICAL HISTORY OF -   5/22/2003    Left Ankle Cyst Removal     SURGICAL HISTORY OF -   2001    Left ankle surgery for ganglion cyst,      SURGICAL HISTORY OF -       Left wrist bone and cyst surgery     Current Outpatient Medications   Medication Sig Dispense Refill     blood glucose (ACCU-CHEK SANDRA PLUS) test strip Use to test blood sugar 2 times daily or as directed.  Ok to substitute alternative if insurance prefers. 200 strip 11     blood glucose monitoring (YUNIER CONTOUR MONITOR) meter device kit Use to test blood sugars 2 times daily or as directed. 1 kit 0     blood glucose monitoring (NO BRAND SPECIFIED) test strip 1 strip by In Vitro route 2 times daily **now using Yunier Contour Next** 1 Box 11     blood glucose monitoring (ONE TOUCH ULTRA 2) meter device kit Use to test blood sugars 4 daily as directed. 1 kit 0     dulaglutide (TRULICITY) 1.5 MG/0.5ML pen Inject 1.5 mg Subcutaneous every 7 days 8 mL 1     empagliflozin (JARDIANCE) 25 MG TABS tablet Take 1 tablet (25 mg) by mouth daily 90 tablet 1     fish oil-omega-3 fatty acids (OMEGA 3) 1000 MG capsule Take 2 capsules by mouth 2 times daily. 360 capsule 3     losartan (COZAAR) 25 MG tablet Take 1 tablet (25 mg) by mouth daily 90 tablet 1     metFORMIN (GLUCOPHAGE-XR) 500 MG 24 hr tablet TAKE TWO TABLETS BY MOUTH TWICE A DAY WITH MEALS 180 tablet 1     omeprazole (PRILOSEC) 20 MG DR capsule TAKE ONE CAPSULE BY MOUTH DAILY. TAKE 30 TO 60 MINUTES BEFORE A MEAL 90 capsule 3     simvastatin (ZOCOR) 40 MG tablet Take 1 tablet (40 mg) by mouth  "At Bedtime 90 tablet 3     OTC products: None, except as noted above    Allergies   Allergen Reactions     Penicillins Nausea and Vomiting     Aspartame Other (See Comments)     Severe pain, passing out      Latex Allergy: NO    Social History     Tobacco Use     Smoking status: Passive Smoke Exposure - Never Smoker     Smokeless tobacco: Never Used   Substance Use Topics     Alcohol use: Yes     Comment: 1 drink every 2 weeks     History   Drug Use No       REVIEW OF SYSTEMS:   CONSTITUTIONAL: NEGATIVE for fever, chills, change in weight  INTEGUMENTARY/SKIN: NEGATIVE for worrisome rashes, moles or lesions  EYES: NEGATIVE for vision changes or irritation  ENT/MOUTH: NEGATIVE for ear, mouth and throat problems  RESP: NEGATIVE for significant cough or SOB  BREAST: NEGATIVE for masses, tenderness or discharge  CV: NEGATIVE for chest pain, palpitations or peripheral edema  GI: NEGATIVE for nausea, abdominal pain, heartburn, or change in bowel habits  : NEGATIVE for frequency, dysuria, or hematuria  MUSCULOSKELETAL:POSITIVE  for right hand and wrist weakness and pain  NEURO: NEGATIVE for weakness, dizziness or paresthesias  ENDOCRINE: NEGATIVE for temperature intolerance, skin/hair changes  HEME: NEGATIVE for bleeding problems  PSYCHIATRIC: NEGATIVE for changes in mood or affect    EXAM:   BP 96/66   Pulse 97   Temp 98.2  F (36.8  C) (Tympanic)   Resp 15   Ht 1.473 m (4' 10\")   Wt 62.1 kg (137 lb)   SpO2 98%   BMI 28.63 kg/m      GENERAL APPEARANCE: healthy, alert and no distress     EYES: EOMI, PERRL     HENT: ear canals and TM's normal and nose and mouth without ulcers or lesions     NECK: no adenopathy, no asymmetry, masses, or scars and thyroid normal to palpation     RESP: lungs clear to auscultation - no rales, rhonchi or wheezes     CV: regular rates and rhythm, normal S1 S2, no S3 or S4 and no murmur, click or rub     ABDOMEN:  soft, nontender, no HSM or masses and bowel sounds normal     MS: extremities " normal- no gross deformities noted, no evidence of inflammation in joints, FROM in all extremities.  Right wrist has limited ROM due to pain, unable to pinch fingers     SKIN: no suspicious lesions or rashes     NEURO: Normal strength and tone, sensory exam grossly normal, mentation intact and speech normal     PSYCH: mentation appears normal. and affect normal/bright     LYMPHATICS: No cervical adenopathy    DIAGNOSTICS:   EKG: Not indicated due to non-vascular surgery and low risk of event (age <65 and without cardiac risk factors)  BMP last done on 8/9/19 and can be repeated at time of surgery if needed since it will be over 7 days from now.    Recent Labs   Lab Test 08/09/19  1335 01/21/19  1416  03/10/17  1201   HGB  --   --   --  12.8   PLT  --   --   --  170    136   < > 136   POTASSIUM 3.8 3.9   < > 3.9   CR 0.61 0.61   < > 0.58   A1C 6.6* 6.9*   < >  --     < > = values in this interval not displayed.      IMPRESSION:   Reason for surgery/procedure: Right wrist cyst, compartment pressure and vein injury  Diagnosis/reason for consult: Pre-operative H&P    The proposed surgical procedure is considered INTERMEDIATE risk.    REVISED CARDIAC RISK INDEX  The patient has the following serious cardiovascular risks for perioperative complications such as (MI, PE, VFib and 3  AV Block):  No serious cardiac risks  INTERPRETATION: 0 risks: Class I (very low risk - 0.4% complication rate)    The patient has the following additional risks for perioperative complications:  No identified additional risks      ICD-10-CM    1. Preop general physical exam Z01.818        RECOMMENDATIONS:       Cardiovascular Risk  Performs 4 METs exercise without symptoms (Light housework (dusting, washing dishes) and Climb a flight of stairs) .         Diabetes Medication Use  -----Hold usual oral and non-insulin diabetic meds (e.g. Metformin, Actos, Glipizide) while NPO.     ACE Inhibitor or Angiotensin Receptor Blocker (ARB) Use  Ace  inhibitor or Angiotensin Receptor Blocker (ARB) and should HOLD this medication for the 24 hours prior to surgery.    APPROVAL GIVEN to proceed with proposed procedure, without further diagnostic evaluation       Signed Electronically by: Yaneli Nice NP    Copy of this evaluation report is provided to requesting physician.

## 2019-10-10 ENCOUNTER — OFFICE VISIT (OUTPATIENT)
Dept: FAMILY MEDICINE | Facility: CLINIC | Age: 49
End: 2019-10-10
Payer: COMMERCIAL

## 2019-10-10 VITALS
SYSTOLIC BLOOD PRESSURE: 112 MMHG | TEMPERATURE: 98 F | BODY MASS INDEX: 29.18 KG/M2 | OXYGEN SATURATION: 97 % | WEIGHT: 139 LBS | HEART RATE: 90 BPM | HEIGHT: 58 IN | DIASTOLIC BLOOD PRESSURE: 70 MMHG

## 2019-10-10 DIAGNOSIS — L23.5 ALLERGIC DERMATITIS DUE TO OTHER CHEMICAL PRODUCT: Primary | ICD-10-CM

## 2019-10-10 PROCEDURE — 99213 OFFICE O/P EST LOW 20 MIN: CPT | Performed by: FAMILY MEDICINE

## 2019-10-10 RX ORDER — TRIAMCINOLONE ACETONIDE 1 MG/G
CREAM TOPICAL 3 TIMES DAILY
Qty: 80 G | Refills: 0 | Status: SHIPPED | OUTPATIENT
Start: 2019-10-10 | End: 2020-02-14

## 2019-10-10 ASSESSMENT — MIFFLIN-ST. JEOR: SCORE: 1140.25

## 2019-10-10 NOTE — PROGRESS NOTES
"Subjective     May Wade is a 48 year old female who presents to clinic today for the following health issues:  Chief Complaint   Patient presents with     Allergic Reaction     Reaction to Adhesive on the tape     HPI   Rash  Onset: Tuesday afternoon was when the tape from a hand surgery was removed and the burn was found. The new rash around the area started last night     Description: New rash around the adhesive burn. Spreading, swollen and itchy   Location: right hand and wrist   Character: raised, bumpy, swollen, oozing and spreading   Itching (Pruritis): YES    Progression of Symptoms:  worsening    Accompanying Signs & Symptoms:  Fever: no   Body aches or joint pain: no   Sore throat symptoms: no   Recent cold symptoms: no     History:   Previous similar rash: no     Precipitating factors:   Exposure to similar rash: no   New exposures: Tape from a recent surgery    Recent travel: no     Alleviating factors:  None    Therapies Tried and outcome: Neosporin - made it worse. Alocaine - No change         Reviewed and updated as needed this visit by Provider         Review of Systems   ROS COMP: Constitutional, HEENT, cardiovascular, pulmonary, gi and gu systems are negative, except as otherwise noted.      Objective    /70   Pulse 90   Temp 98  F (36.7  C) (Tympanic)   Ht 1.473 m (4' 10\")   Wt 63 kg (139 lb)   SpO2 97%   BMI 29.05 kg/m    Body mass index is 29.05 kg/m .  Physical Exam   GENERAL: healthy, alert and no distress  SKIN: right wrist chemical dermatitis/blistering along either side of three different scars.  She shows me a picture of the \"tape\" that was on her wrist which was steri strips.  The steri strip distribution makes it unlikely that she reacted to that, but it appears to have a distribution where the tincture of benzoin would have been placed to hold the steri strips in place.      There is a milder maculopapular rash surrounding this area (?neosporin vs alocaine " spray)    Diagnostic Test Results:  Labs reviewed in Epic        Assessment & Plan       ICD-10-CM    1. Allergic dermatitis due to other chemical product L23.5 triamcinolone (KENALOG) 0.1 % external cream     This is a chemical burn/contact dermatitis to tincture of benzion which was added to her allergy list.     Topical steroid.  Avoid neosporin.             Return in about 3 months (around 1/10/2020) for diabetes recheck.    Julia Reyna MD  [facility]

## 2019-10-29 ENCOUNTER — HOSPITAL ENCOUNTER (EMERGENCY)
Facility: CLINIC | Age: 49
Discharge: HOME | End: 2019-10-29
Attending: EMERGENCY MEDICINE | Admitting: EMERGENCY MEDICINE
Payer: COMMERCIAL

## 2019-10-29 VITALS
TEMPERATURE: 98.1 F | SYSTOLIC BLOOD PRESSURE: 128 MMHG | HEIGHT: 59 IN | OXYGEN SATURATION: 98 % | DIASTOLIC BLOOD PRESSURE: 84 MMHG | RESPIRATION RATE: 18 BRPM | BODY MASS INDEX: 26.61 KG/M2 | WEIGHT: 132 LBS

## 2019-10-29 DIAGNOSIS — S01.81XA LACERATION OF BROW WITHOUT COMPLICATION, INITIAL ENCOUNTER: ICD-10-CM

## 2019-10-29 PROCEDURE — 12013 RPR F/E/E/N/L/M 2.6-5.0 CM: CPT

## 2019-10-29 PROCEDURE — 99283 EMERGENCY DEPT VISIT LOW MDM: CPT

## 2019-10-29 PROCEDURE — 27210282 ZZH ADHESIVE DERMABOND SKIN

## 2019-10-29 ASSESSMENT — MIFFLIN-ST. JEOR: SCORE: 1116.44

## 2019-10-29 ASSESSMENT — ENCOUNTER SYMPTOMS
WOUND: 1
NECK PAIN: 0
HEADACHES: 0

## 2019-10-29 NOTE — DISCHARGE INSTRUCTIONS
Ice, Motrin as needed.  Use sunscreen for a year.  Mederma scar cream as directed after the glue and scab falls off.  Ok to shower but no soap and try to keep area dry.  Recheck at any time if you have problems.

## 2019-10-29 NOTE — ED PROVIDER NOTES
History     Chief Complaint:  Laceration     HPI  May Wade is a 48 year old female who presents with a laceration. The patient received a laceration just superior to her right eyebrow upon attempting to pull a shaq out of the back of a vehicle. She has since had no headache. Here, she states she is allergic to what is believed to be tincture benzoin as she had a reaction to this in the past. She denies any neck pain. Her last tetanus was in 2012.     Allergies:  Penicillins; Aspartame; and Benzoin    Medications:    blood glucose (ACCU-CHEK SANDRA PLUS) test strip  blood glucose monitoring (CECILY CONTOUR MONITOR) meter device kit  blood glucose monitoring (NO BRAND SPECIFIED) test strip  blood glucose monitoring (ONE TOUCH ULTRA 2) meter device kit  dulaglutide (TRULICITY) 1.5 MG/0.5ML pen  empagliflozin (JARDIANCE) 25 MG TABS tablet  fish oil-omega-3 fatty acids (OMEGA 3) 1000 MG capsule  losartan (COZAAR) 25 MG tablet  metFORMIN (GLUCOPHAGE-XR) 500 MG 24 hr tablet  omeprazole (PRILOSEC) 20 MG DR capsule  simvastatin (ZOCOR) 40 MG tablet  triamcinolone (KENALOG) 0.1 % external cream    Past Medical History:    Cervical dysplasia   Chronic hip pain   Gestational diabetes mellitus   Migraines   Hyperlipidemia    Gastro esophageal reflux disease   Diabetes mellitus II  Hypertension     Past Surgical History:    Colonoscopy   Cryotherapy   Hysterectomy   Left wrist dorsal ganglion cyst excision   Right ankle surgery   Breast reduction   Abdominal wall revision   Left ankle cyst removal      Family History:    Coronary artery disease   Cerebrovascular disease   Hypertension     Social History:  Marital Status:     Smoker:   Passive smoke exposure, otherwise never   Smokeless:   Never   Alcohol:   Yes, 1 drink every 2 weeks   Drugs:   No     Review of Systems   Musculoskeletal: Negative for neck pain.   Skin: Positive for wound.   Neurological: Negative for headaches.   All other systems reviewed and are  "negative.    Physical Exam     Patient Vitals for the past 24 hrs:   BP Temp Temp src Heart Rate Resp SpO2 Height Weight   10/29/19 1426 128/84 98.1  F (36.7  C) Oral 94 18 98 % 1.486 m (4' 10.5\") 59.9 kg (132 lb)     Physical Exam  Nursing note and vitals reviewed.  Constitutional:  Alert.  Appears well-developed and well-nourished, comfortable.   HENT:   Nose:    Nose no blood  Eyes:    Conjunctivae are normal.  Extraocular movements are intact and pupils are equal.     Right eye exhibits no discharge. Left eye exhibits no discharge.   Neurological:   Alert and appropriate. No focal weakness.  GCS of 15.  Skin:    Skin is warm and dry. No rash noted. No diaphoresis.      No erythema. No pallor.  There is a 2.8 cm laceration through the eyebrow on the right, barely full-thickness.   Psychiatric:   Behavior is normal. Judgment and thought content normal.     Emergency Department Course   Procedures:    Procedure: Laceration Repair        LACERATION:  A simple clean 2.8 cm laceration.      LOCATION:  Right eyebrow      FUNCTION: Periorbital muscles appear to work normally.      ANESTHESIA:  LET - Topical      PREPARATION:  Irrigation and Scrubbing with Normal Saline and Shur Clens      DEBRIDEMENT:  no debridement      CLOSURE:  Wound was closed with skin adhesive.             Emergency Department Course:  1500 I performed an exam of the patient as documented above.   1515 I rechecked the patient and discussed the results of their workup thus far.     Findings and plan explained. Patient discharged home with instructions regarding supportive care and reasons to return. The importance of close follow-up was reviewed. I personally answered all related questions prior to discharge.    Impression & Plan    Medical Decision Making:  May Wade is a 48 year old years old female who presents for evaluation of a cut over her right eyebrow.  It is partial-thickness and clean.  It was cleansed, let was applied, and I was " able to use skin adhesive with excellent results.  She did not sustain any significant head injury with this or neck pain.  Routine instructions were given.    Diagnosis:    ICD-10-CM    1. Laceration of brow without complication, initial encounter S01.81XA      Disposition:  discharged to home. Ice, Motrin as needed.  Use sunscreen for a year.  Mederma scar cream as directed after the glue and scab falls off.  Ok to shower but no soap and try to keep area dry.  Recheck at any time if you have problems.    Scribe Disclosure:  I, Ronald Rollins, am serving as a scribe on 10/29/2019 at 3:00 PM to personally document services performed by Tanya Dave MD based on my observations and the provider's statements to me.      Tanya Dave MD  10/29/19 2661

## 2019-10-29 NOTE — ED AVS SNAPSHOT
Emergency Department  [address] [place] [address]  Phone:  [phone]  Fax:  [phone]                                    May Wade   MRN: [id number]    Department:   Emergency Department   Date of Visit:  10/29/2019           After Visit Summary Signature Page    I have received my discharge instructions, and my questions have been answered. I have discussed any challenges I see with this plan with the nurse or doctor.    ..........................................................................................................................................  Patient/Patient Representative Signature      ..........................................................................................................................................  Patient Representative Print Name and Relationship to Patient    ..................................................               ................................................  Date                                   Time    ..........................................................................................................................................  Reviewed by Signature/Title    ...................................................              ..............................................  Date                                               Time          22EPIC Rev 08/18

## 2019-12-12 DIAGNOSIS — E11.29 TYPE 2 DIABETES MELLITUS WITH MICROALBUMINURIA, WITHOUT LONG-TERM CURRENT USE OF INSULIN (H): ICD-10-CM

## 2019-12-12 DIAGNOSIS — R80.9 TYPE 2 DIABETES MELLITUS WITH MICROALBUMINURIA, WITHOUT LONG-TERM CURRENT USE OF INSULIN (H): ICD-10-CM

## 2019-12-12 NOTE — TELEPHONE ENCOUNTER
"Requested Prescriptions   Pending Prescriptions Disp Refills     metFORMIN (GLUCOPHAGE-XR) 500 MG 24 hr tablet [Pharmacy Med Name: METFORMIN HCL ER 500MG TB24] 180 tablet 1     Sig: TAKE TWO TABLETS BY MOUTH TWICE A DAY WITH MEALS       Biguanide Agents Passed - 12/12/2019  3:06 PM        Passed - Blood pressure less than 140/90 in past 6 months     BP Readings from Last 3 Encounters:   10/29/19 128/84   10/10/19 112/70   09/05/19 96/66                 Passed - Patient has documented LDL within the past 12 mos.     Recent Labs   Lab Test 01/21/19  1416   LDL 83             Passed - Patient has had a Microalbumin in the past 15 mos.     Recent Labs   Lab Test 01/21/19  1459   MICROL 11   UMALCR 13.38             Passed - Patient is age 10 or older        Passed - Patient has documented A1c within the specified period of time.     If HgbA1C is 8 or greater, it needs to be on file within the past 3 months.  If less than 8, must be on file within the past 6 months.     Recent Labs   Lab Test 08/09/19  1335   A1C 6.6*             Passed - Patient's CR is NOT>1.4 OR Patient's EGFR is NOT<45 within past 12 mos.     Recent Labs   Lab Test 08/09/19  1335   GFRESTIMATED >90   GFRESTBLACK >90       Recent Labs   Lab Test 08/09/19  1335   CR 0.61             Passed - Patient does NOT have a diagnosis of CHF.        Passed - Medication is active on med list        Passed - Patient is not pregnant        Passed - Patient has not had a positive pregnancy test within the past 12 mos.         Passed - Recent (6 mo) or future (30 days) visit within the authorizing provider's specialty     Patient had office visit in the last 6 months or has a visit in the next 30 days with authorizing provider or within the authorizing provider's specialty.  See \"Patient Info\" tab in inbasket, or \"Choose Columns\" in Meds & Orders section of the refill encounter.            Last Written Prescription Date:  8/9/2019  Last Fill Quantity: 180,  # " refills: 1   Last office visit: 10/10/2019 with prescribing provider:  Buchanan    Future Office Visit:

## 2019-12-13 RX ORDER — METFORMIN HCL 500 MG
TABLET, EXTENDED RELEASE 24 HR ORAL
Qty: 180 TABLET | Refills: 0 | Status: SHIPPED | OUTPATIENT
Start: 2019-12-13 | End: 2020-02-03

## 2020-01-20 ENCOUNTER — TELEPHONE (OUTPATIENT)
Dept: EDUCATION SERVICES | Facility: CLINIC | Age: 50
End: 2020-01-20

## 2020-01-20 DIAGNOSIS — E11.29 TYPE 2 DIABETES MELLITUS WITH MICROALBUMINURIA, WITHOUT LONG-TERM CURRENT USE OF INSULIN (H): Primary | ICD-10-CM

## 2020-01-20 DIAGNOSIS — R80.9 TYPE 2 DIABETES MELLITUS WITH MICROALBUMINURIA, WITHOUT LONG-TERM CURRENT USE OF INSULIN (H): Primary | ICD-10-CM

## 2020-01-20 NOTE — TELEPHONE ENCOUNTER
Diabetes education contact:    Dr. Axel Olvera wants to switch over to ozempic instead of trulicity due to studies show better wt loss data.    They recommend that a pt start on 0.25 mg weekly for 4 week then go to 0.50 mg weekly.  If still not at goal can go up to 1.0 mg weekly.    I suggest she do the 0.25 mg for two weeks, if feeling fine then go up to 0.5 mg dose since she is already taking the trulicity.    Are you ok with this plan?    Thanks! Julia Parks RD, CDE

## 2020-01-31 DIAGNOSIS — R80.9 TYPE 2 DIABETES MELLITUS WITH MICROALBUMINURIA, WITHOUT LONG-TERM CURRENT USE OF INSULIN (H): ICD-10-CM

## 2020-01-31 DIAGNOSIS — E11.29 TYPE 2 DIABETES MELLITUS WITH MICROALBUMINURIA, WITHOUT LONG-TERM CURRENT USE OF INSULIN (H): ICD-10-CM

## 2020-01-31 NOTE — LETTER
[facility]  [address]  [facility] [address]  [phone]        February 3, 2020  May Wade  [address] [address] [address]    Dear May,    I care about your health and have reviewed your health plan. I have reviewed your medical conditions, medication list, and lab results and am making recommendations based on this review, to better manage your health.    You are in particular need of attention regarding:  -Diabetes    I am recommending that you:  -schedule a FOLLOWUP OFFICE APPOINTMENT with me. Please come fasting for blood work. No food or drink other than water for 10-12 hours prior to appointment.     Please call us at [phone] (or use [facility]) to address the above recommendations.     Thank you for trusting [facility] and we appreciate the opportunity to serve you.  We look forward to supporting your healthcare needs in the future.    Healthy Regards,    Julia Reyna MD/Agnes ROD RN

## 2020-01-31 NOTE — TELEPHONE ENCOUNTER
Requested Prescriptions   Pending Prescriptions Disp Refills     metFORMIN (GLUCOPHAGE-XR) 500 MG 24 hr tablet [Pharmacy Med Name: METFORMIN HCL ER 500MG TB24]  Last Written Prescription Date:  12/13/19  Last Fill Quantity: 180,  # refills: 0   Last Office Visit with ZHANE, SHEILA or [facility] prescribing provider:  10/10/19   Future Office Visit:      180 tablet 0     Sig: TAKE TWO TABLETS BY MOUTH TWICE A DAY WITH MEALS       Biguanide Agents Failed - 1/31/2020  4:40 PM        Failed - Patient has documented LDL within the past 12 mos.     Recent Labs   Lab Test 01/21/19  1416   LDL 83             Passed - Blood pressure less than 140/90 in past 6 months     BP Readings from Last 3 Encounters:   10/29/19 128/84   10/10/19 112/70   09/05/19 96/66                 Passed - Patient has had a Microalbumin in the past 15 mos.     Recent Labs   Lab Test 01/21/19  1459   MICROL 11   UMALCR 13.38             Passed - Patient is age 10 or older        Passed - Patient has documented A1c within the specified period of time.     If HgbA1C is 8 or greater, it needs to be on file within the past 3 months.  If less than 8, must be on file within the past 6 months.     Recent Labs   Lab Test 08/09/19  1335   A1C 6.6*             Passed - Patient's CR is NOT>1.4 OR Patient's EGFR is NOT<45 within past 12 mos.     Recent Labs   Lab Test 08/09/19  1335   GFRESTIMATED >90   GFRESTBLACK >90       Recent Labs   Lab Test 08/09/19  1335   CR 0.61             Passed - Patient does NOT have a diagnosis of CHF.        Passed - Medication is active on med list        Passed - Patient is not pregnant        Passed - Patient has not had a positive pregnancy test within the past 12 mos.         Passed - Recent (6 mo) or future (30 days) visit within the authorizing provider's specialty     Patient had office visit in the last 6 months or has a visit in the next 30 days with authorizing provider or within the authorizing provider's specialty.  See  "\"Patient Info\" tab in inbasket, or \"Choose Columns\" in Meds & Orders section of the refill encounter.              "

## 2020-02-03 RX ORDER — METFORMIN HCL 500 MG
TABLET, EXTENDED RELEASE 24 HR ORAL
Qty: 60 TABLET | Refills: 0 | Status: SHIPPED | OUTPATIENT
Start: 2020-02-03 | End: 2020-02-14

## 2020-02-03 NOTE — TELEPHONE ENCOUNTER
Routing refill request to provider for review/approval because:  Labs not current:  Lipids  Patient needs to be seen because:  Last OV 10/10/19: Return in about 3 months (around 1/10/2020) for diabetes recheck.    Letter mailed to alex ROD RN

## 2020-02-14 ENCOUNTER — OFFICE VISIT (OUTPATIENT)
Dept: FAMILY MEDICINE | Facility: CLINIC | Age: 50
End: 2020-02-14
Payer: COMMERCIAL

## 2020-02-14 VITALS
TEMPERATURE: 98.4 F | SYSTOLIC BLOOD PRESSURE: 114 MMHG | DIASTOLIC BLOOD PRESSURE: 60 MMHG | OXYGEN SATURATION: 99 % | BODY MASS INDEX: 26.41 KG/M2 | HEIGHT: 59 IN | HEART RATE: 83 BPM | WEIGHT: 131 LBS | RESPIRATION RATE: 18 BRPM

## 2020-02-14 DIAGNOSIS — R80.9 TYPE 2 DIABETES MELLITUS WITH MICROALBUMINURIA, WITHOUT LONG-TERM CURRENT USE OF INSULIN (H): Primary | ICD-10-CM

## 2020-02-14 DIAGNOSIS — I10 HTN, GOAL BELOW 140/90: ICD-10-CM

## 2020-02-14 DIAGNOSIS — E11.29 TYPE 2 DIABETES MELLITUS WITH MICROALBUMINURIA, WITHOUT LONG-TERM CURRENT USE OF INSULIN (H): Primary | ICD-10-CM

## 2020-02-14 LAB
ALBUMIN SERPL-MCNC: 4 G/DL (ref 3.4–5)
ALP SERPL-CCNC: 73 U/L (ref 40–150)
ALT SERPL W P-5'-P-CCNC: 46 U/L (ref 0–50)
ANION GAP SERPL CALCULATED.3IONS-SCNC: 5 MMOL/L (ref 3–14)
AST SERPL W P-5'-P-CCNC: 22 U/L (ref 0–45)
BILIRUB SERPL-MCNC: 0.6 MG/DL (ref 0.2–1.3)
BUN SERPL-MCNC: 11 MG/DL (ref 7–30)
CALCIUM SERPL-MCNC: 8.9 MG/DL (ref 8.5–10.1)
CHLORIDE SERPL-SCNC: 102 MMOL/L (ref 94–109)
CHOLEST SERPL-MCNC: 123 MG/DL
CO2 SERPL-SCNC: 28 MMOL/L (ref 20–32)
CREAT SERPL-MCNC: 0.66 MG/DL (ref 0.52–1.04)
CREAT UR-MCNC: 89 MG/DL
GFR SERPL CREATININE-BSD FRML MDRD: >90 ML/MIN/{1.73_M2}
GLUCOSE SERPL-MCNC: 143 MG/DL (ref 70–99)
HBA1C MFR BLD: 6.1 % (ref 0–5.6)
HDLC SERPL-MCNC: 47 MG/DL
LDLC SERPL CALC-MCNC: 38 MG/DL
MICROALBUMIN UR-MCNC: 10 MG/L
MICROALBUMIN/CREAT UR: 10.85 MG/G CR (ref 0–25)
NONHDLC SERPL-MCNC: 76 MG/DL
POTASSIUM SERPL-SCNC: 4.1 MMOL/L (ref 3.4–5.3)
PROT SERPL-MCNC: 7.9 G/DL (ref 6.8–8.8)
SODIUM SERPL-SCNC: 135 MMOL/L (ref 133–144)
TRIGL SERPL-MCNC: 189 MG/DL

## 2020-02-14 PROCEDURE — 83036 HEMOGLOBIN GLYCOSYLATED A1C: CPT | Performed by: FAMILY MEDICINE

## 2020-02-14 PROCEDURE — 99214 OFFICE O/P EST MOD 30 MIN: CPT | Performed by: FAMILY MEDICINE

## 2020-02-14 PROCEDURE — 36415 COLL VENOUS BLD VENIPUNCTURE: CPT | Performed by: FAMILY MEDICINE

## 2020-02-14 PROCEDURE — 82043 UR ALBUMIN QUANTITATIVE: CPT | Performed by: FAMILY MEDICINE

## 2020-02-14 PROCEDURE — 80061 LIPID PANEL: CPT | Performed by: FAMILY MEDICINE

## 2020-02-14 PROCEDURE — 80053 COMPREHEN METABOLIC PANEL: CPT | Performed by: FAMILY MEDICINE

## 2020-02-14 PROCEDURE — 99207 C FOOT EXAM  NO CHARGE: CPT | Performed by: FAMILY MEDICINE

## 2020-02-14 RX ORDER — LOSARTAN POTASSIUM 25 MG/1
25 TABLET ORAL DAILY
Qty: 90 TABLET | Refills: 1 | Status: SHIPPED | OUTPATIENT
Start: 2020-02-14 | End: 2020-09-14

## 2020-02-14 RX ORDER — METFORMIN HCL 500 MG
1000 TABLET, EXTENDED RELEASE 24 HR ORAL 2 TIMES DAILY WITH MEALS
Qty: 360 TABLET | Refills: 1 | Status: SHIPPED | OUTPATIENT
Start: 2020-02-14 | End: 2020-09-08

## 2020-02-14 ASSESSMENT — PAIN SCALES - GENERAL: PAINLEVEL: NO PAIN (0)

## 2020-02-14 ASSESSMENT — MIFFLIN-ST. JEOR: SCORE: 1106.9

## 2020-02-14 NOTE — PATIENT INSTRUCTIONS
Our Clinic hours are:  Mondays    7:20 am - 7 pm  Tues -  Fri  7:20 am - 5 pm    Clinic Phone: [phone]    The clinic lab opens at 7:30 am Mon - Fri and appointments are required.    [facility]. [phone]  Monday  8 am - 7pm  Tues - Fri 8 am - 5:30 pm

## 2020-02-14 NOTE — PROGRESS NOTES
Subjective     May Wade is a 49 year old female who presents to clinic today for the following health issues:    HPI   Diabetes Follow-up      How often are you checking your blood sugar? Not at all    What concerns do you have today about your diabetes? Low blood sugar when she doesn't eat every 3 hours     Do you have any of these symptoms? (Select all that apply)  No numbness or tingling in feet.  No redness, sores or blisters on feet.  No complaints of excessive thirst.  No reports of blurry vision.  No significant changes to weight.    Have you had a diabetic eye exam in the last 12 months? No- lasix done last year          Hyperlipidemia Follow-Up      Are you regularly taking any medication or supplement to lower your cholesterol?   Yes- simvastatin    Are you having muscle aches or other side effects that you think could be caused by your cholesterol lowering medication?  No    Hypertension Follow-up      Do you check your blood pressure regularly outside of the clinic? No     Are you following a low salt diet? No    Are your blood pressures ever more than 140 on the top number (systolic) OR more   than 90 on the bottom number (diastolic), for example 140/90? No    BP Readings from Last 2 Encounters:   02/14/20 114/60   10/29/19 128/84     Hemoglobin A1C (%)   Date Value   02/14/2020 6.1 (H)   08/09/2019 6.6 (H)     LDL Cholesterol Calculated (mg/dL)   Date Value   01/21/2019 83   02/06/2018 33         How many servings of fruits and vegetables do you eat daily?  4 or more    On average, how many sweetened beverages do you drink each day (Examples: soda, juice, sweet tea, etc.  Do NOT count diet or artificially sweetened beverages)?   0    How many days per week do you exercise enough to make your heart beat faster? 3 or less    How many minutes a day do you exercise enough to make your heart beat faster? 60 or more    How many days per week do you miss taking your medication? 0    Reviewed and  "updated as needed this visit by Provider         Review of Systems   ROS COMP: Constitutional, HEENT, cardiovascular, pulmonary, gi and gu systems are negative, except as otherwise noted.      Objective    /60   Pulse 83   Temp 98.4  F (36.9  C) (Tympanic)   Resp 18   Ht 1.486 m (4' 10.5\")   Wt 59.4 kg (131 lb)   SpO2 99%   Breastfeeding No   BMI 26.91 kg/m    Body mass index is 26.91 kg/m .  Physical Exam   GENERAL: healthy, alert and no distress  NECK: no adenopathy, no asymmetry, masses, or scars and thyroid normal to palpation  RESP: lungs clear to auscultation - no rales, rhonchi or wheezes  CV: regular rate and rhythm, normal S1 S2, no S3 or S4, no murmur, click or rub, no peripheral edema and peripheral pulses strong  ABDOMEN: soft, nontender, no hepatosplenomegaly, no masses and bowel sounds normal  MS: no gross musculoskeletal defects noted, no edema    Diabetic Foot Screen:  Any complaints of increased pain or numbness ? No  Is there a foot ulcer now or a history of foot ulcer? No  Does the foot have an abnormal shape? No  Are the nails thick, too long or ingrown? No  Are there any redness or open areas? No         Sensation Testing done at all points on the diagram with monofilament     Right Foot: Sensation Normal at all points  Left Foot: Sensation Normal at all points     Risk Category: 0- No loss of protective sensation  Performed by Julia Reyna MD        Diagnostic Test Results:  Labs reviewed in Epic  Results for orders placed or performed in visit on 02/14/20 (from the past 24 hour(s))   Hemoglobin A1c   Result Value Ref Range    Hemoglobin A1C 6.1 (H) 0 - 5.6 %           Assessment & Plan     1. Type 2 diabetes mellitus with microalbuminuria, without long-term current use of insulin (H)   well controlled, recheck 6 months  - Comprehensive metabolic panel  - Lipid panel reflex to direct LDL Fasting  - Hemoglobin A1c  - Albumin Random Urine Quantitative with Creat Ratio  - FOOT " "EXAM  - losartan (COZAAR) 25 MG tablet; Take 1 tablet (25 mg) by mouth daily  Dispense: 90 tablet; Refill: 1  - empagliflozin (JARDIANCE) 25 MG TABS tablet; Take 1 tablet (25 mg) by mouth daily  Dispense: 90 tablet; Refill: 1  - metFORMIN (GLUCOPHAGE-XR) 500 MG 24 hr tablet; Take 2 tablets (1,000 mg) by mouth 2 times daily (with meals)  Dispense: 360 tablet; Refill: 1    2. HTN, goal below 140/90     - losartan (COZAAR) 25 MG tablet; Take 1 tablet (25 mg) by mouth daily  Dispense: 90 tablet; Refill: 1     BMI:   Estimated body mass index is 26.91 kg/m  as calculated from the following:    Height as of this encounter: 1.486 m (4' 10.5\").    Weight as of this encounter: 59.4 kg (131 lb).   Weight management plan: Discussed healthy diet and exercise guidelines            No follow-ups on file.    Julia Reyna MD  [facility]      "

## 2020-02-14 NOTE — LETTER
[facility]  [address]  [facility] [address]  Phone: [phone]      2/17/2020     May Wade  [id number] [address] [facility]  [place] [address]      Dear May:    Thank you for allowing me to participate in your care. Your recent test results were reviewed and listed below.          Your results are provided below for your review  Results for orders placed or performed in visit on 02/14/20   Comprehensive metabolic panel     Status: Abnormal   Result Value Ref Range    Sodium 135 133 - 144 mmol/L    Potassium 4.1 3.4 - 5.3 mmol/L    Chloride 102 94 - 109 mmol/L    Carbon Dioxide 28 20 - 32 mmol/L    Anion Gap 5 3 - 14 mmol/L    Glucose 143 (H) 70 - 99 mg/dL    Urea Nitrogen 11 7 - 30 mg/dL    Creatinine 0.66 0.52 - 1.04 mg/dL    GFR Estimate >90 >60 mL/min/[1.73_m2]    GFR Estimate If Black >90 >60 mL/min/[1.73_m2]    Calcium 8.9 8.5 - 10.1 mg/dL    Bilirubin Total 0.6 0.2 - 1.3 mg/dL    Albumin 4.0 3.4 - 5.0 g/dL    Protein Total 7.9 6.8 - 8.8 g/dL    Alkaline Phosphatase 73 40 - 150 U/L    ALT 46 0 - 50 U/L    AST 22 0 - 45 U/L   Lipid panel reflex to direct LDL Fasting     Status: Abnormal   Result Value Ref Range    Cholesterol 123 <200 mg/dL    Triglycerides 189 (H) <150 mg/dL    HDL Cholesterol 47 (L) >49 mg/dL    LDL Cholesterol Calculated 38 <100 mg/dL    Non HDL Cholesterol 76 <130 mg/dL   Hemoglobin A1c     Status: Abnormal   Result Value Ref Range    Hemoglobin A1C 6.1 (H) 0 - 5.6 %   Albumin Random Urine Quantitative with Creat Ratio     Status: None   Result Value Ref Range    Creatinine Urine 89 mg/dL    Albumin Urine mg/L 10 mg/L    Albumin Urine mg/g Cr 10.85 0 - 25 mg/g Cr                 Thank you for choosing [facility]. As a result, please continue with the treatment plan discussed in the office. Return as discussed or sooner if symptoms worsen or fail to improve.     If you have any further questions or concerns, please do not hesitate to contact  us.      Sincerely,        Dr. Julia Reyna/[facility]

## 2020-03-22 ENCOUNTER — HEALTH MAINTENANCE LETTER (OUTPATIENT)
Age: 50
End: 2020-03-22

## 2020-06-22 ENCOUNTER — ANCILLARY PROCEDURE (OUTPATIENT)
Dept: MAMMOGRAPHY | Facility: CLINIC | Age: 50
End: 2020-06-22
Attending: FAMILY MEDICINE
Payer: COMMERCIAL

## 2020-06-22 DIAGNOSIS — Z12.31 VISIT FOR SCREENING MAMMOGRAM: ICD-10-CM

## 2020-06-22 PROCEDURE — 77067 SCR MAMMO BI INCL CAD: CPT | Mod: TC

## 2020-06-22 PROCEDURE — 77063 BREAST TOMOSYNTHESIS BI: CPT | Mod: TC

## 2020-08-18 ENCOUNTER — NURSE TRIAGE (OUTPATIENT)
Dept: NURSING | Facility: CLINIC | Age: 50
End: 2020-08-18

## 2020-08-18 NOTE — TELEPHONE ENCOUNTER
Caller states that she is having a surgery next week Thursday, called to request an order for a COVID-19 testing.  Advised to call the surgeon or primary care providerfor this.  Latasha Haynes RN    Reason for Disposition    [1] Caller requesting NON-URGENT health information AND [2] PCP's office is the best resource    Additional Information    Negative: [1] Caller is not with the adult (patient) AND [2] reporting urgent symptoms    Negative: Lab result questions    Negative: Medication questions    Negative: Caller can't be reached by phone    Negative: Caller has already spoken to PCP or another triager    Negative: RN needs further essential information from caller in order to complete triage    Negative: Requesting regular office appointment    Protocols used: INFORMATION ONLY CALL-A-

## 2020-08-24 DIAGNOSIS — R80.9 TYPE 2 DIABETES MELLITUS WITH MICROALBUMINURIA, WITHOUT LONG-TERM CURRENT USE OF INSULIN (H): ICD-10-CM

## 2020-08-24 DIAGNOSIS — E11.29 TYPE 2 DIABETES MELLITUS WITH MICROALBUMINURIA, WITHOUT LONG-TERM CURRENT USE OF INSULIN (H): ICD-10-CM

## 2020-08-25 RX ORDER — EMPAGLIFLOZIN 25 MG/1
TABLET, FILM COATED ORAL
Qty: 90 TABLET | Refills: 1 | Status: SHIPPED | OUTPATIENT
Start: 2020-08-25 | End: 2021-02-09

## 2020-08-25 NOTE — TELEPHONE ENCOUNTER
"Requested Prescriptions   Pending Prescriptions Disp Refills     JARDIANCE 25 MG TABS tablet [Pharmacy Med Name: JARDIANCE 25MG TABS] 90 tablet 1     Sig: TAKE ONE TABLET BY MOUTH ONCE DAILY       Sodium Glucose Co-Transport Inhibitor Agents Failed - 8/24/2020  7:04 PM        Failed - Patient has documented A1c within the specified period of time.     If HgbA1C is 8 or greater, it needs to be on file within the past 3 months.  If less than 8, must be on file within the past 6 months.     Recent Labs   Lab Test 02/14/20  1037   A1C 6.1*             Passed - No creatinine >1.4 or GFR <45 within the past 12 mos     Recent Labs   Lab Test 02/14/20  1037   GFRESTIMATED >90   GFRESTBLACK >90       Recent Labs   Lab Test 02/14/20  1037   CR 0.66             Passed - Medication is active on med list        Passed - Patient is age 18 or older        Passed - Patient is not pregnant        Passed - Patient has documented normal Potassium within the last 12 mos.     Recent Labs   Lab Test 02/14/20  1037   POTASSIUM 4.1             Passed - Patient has no positive pregnancy test within the past 12 mos.        Passed - Recent (6 mo) or future (30 days) visit within the authorizing provider's specialty     Patient had office visit in the last 6 months or has a visit in the next 30 days with authorizing provider or within the authorizing provider's specialty.  See \"Patient Info\" tab in inbasket, or \"Choose Columns\" in Meds & Orders section of the refill encounter.                 "

## 2020-08-25 NOTE — TELEPHONE ENCOUNTER
Patient has an appt tomorrow with Yoselyn Richards for preop.   Will need Hgb A1c for this refill, please.   Mary Magaña RNC

## 2020-08-26 ENCOUNTER — OFFICE VISIT (OUTPATIENT)
Dept: FAMILY MEDICINE | Facility: CLINIC | Age: 50
End: 2020-08-26
Payer: COMMERCIAL

## 2020-08-26 VITALS
HEIGHT: 59 IN | HEART RATE: 88 BPM | WEIGHT: 134 LBS | SYSTOLIC BLOOD PRESSURE: 102 MMHG | DIASTOLIC BLOOD PRESSURE: 70 MMHG | OXYGEN SATURATION: 99 % | RESPIRATION RATE: 16 BRPM | BODY MASS INDEX: 27.01 KG/M2 | TEMPERATURE: 97.9 F

## 2020-08-26 DIAGNOSIS — E11.29 TYPE 2 DIABETES MELLITUS WITH MICROALBUMINURIA, WITHOUT LONG-TERM CURRENT USE OF INSULIN (H): ICD-10-CM

## 2020-08-26 DIAGNOSIS — Z01.818 PRE-OPERATIVE EXAMINATION: Primary | ICD-10-CM

## 2020-08-26 DIAGNOSIS — R80.9 TYPE 2 DIABETES MELLITUS WITH MICROALBUMINURIA, WITHOUT LONG-TERM CURRENT USE OF INSULIN (H): ICD-10-CM

## 2020-08-26 DIAGNOSIS — M25.511 RIGHT SHOULDER PAIN, UNSPECIFIED CHRONICITY: ICD-10-CM

## 2020-08-26 LAB
ANION GAP SERPL CALCULATED.3IONS-SCNC: 5 MMOL/L (ref 3–14)
BUN SERPL-MCNC: 6 MG/DL (ref 7–30)
CALCIUM SERPL-MCNC: 8.9 MG/DL (ref 8.5–10.1)
CHLORIDE SERPL-SCNC: 105 MMOL/L (ref 94–109)
CO2 SERPL-SCNC: 28 MMOL/L (ref 20–32)
CREAT SERPL-MCNC: 0.62 MG/DL (ref 0.52–1.04)
ERYTHROCYTE [DISTWIDTH] IN BLOOD BY AUTOMATED COUNT: 13 % (ref 10–15)
GFR SERPL CREATININE-BSD FRML MDRD: >90 ML/MIN/{1.73_M2}
GLUCOSE SERPL-MCNC: 134 MG/DL (ref 70–99)
HBA1C MFR BLD: 6.6 % (ref 0–5.6)
HCT VFR BLD AUTO: 39.7 % (ref 35–47)
HGB BLD-MCNC: 12.8 G/DL (ref 11.7–15.7)
MCH RBC QN AUTO: 28.2 PG (ref 26.5–33)
MCHC RBC AUTO-ENTMCNC: 32.2 G/DL (ref 31.5–36.5)
MCV RBC AUTO: 87 FL (ref 78–100)
PLATELET # BLD AUTO: 205 10E9/L (ref 150–450)
POTASSIUM SERPL-SCNC: 3.9 MMOL/L (ref 3.4–5.3)
RBC # BLD AUTO: 4.54 10E12/L (ref 3.8–5.2)
SODIUM SERPL-SCNC: 138 MMOL/L (ref 133–144)
WBC # BLD AUTO: 6.3 10E9/L (ref 4–11)

## 2020-08-26 PROCEDURE — 83036 HEMOGLOBIN GLYCOSYLATED A1C: CPT | Performed by: NURSE PRACTITIONER

## 2020-08-26 PROCEDURE — 99214 OFFICE O/P EST MOD 30 MIN: CPT | Performed by: NURSE PRACTITIONER

## 2020-08-26 PROCEDURE — 80048 BASIC METABOLIC PNL TOTAL CA: CPT | Performed by: NURSE PRACTITIONER

## 2020-08-26 PROCEDURE — 85027 COMPLETE CBC AUTOMATED: CPT | Performed by: NURSE PRACTITIONER

## 2020-08-26 PROCEDURE — 36415 COLL VENOUS BLD VENIPUNCTURE: CPT | Performed by: NURSE PRACTITIONER

## 2020-08-26 ASSESSMENT — MIFFLIN-ST. JEOR: SCORE: 1120.51

## 2020-08-26 NOTE — PATIENT INSTRUCTIONS

## 2020-08-26 NOTE — LETTER
August 27, 2020      May Wade  [address] [facility] [address]        Dear ,    We are writing to inform you of your test results.    Labs stable    Resulted Orders   Hemoglobin A1c   Result Value Ref Range    Hemoglobin A1C 6.6 (H) 0 - 5.6 %      Comment:      Normal <5.7% Prediabetes 5.7-6.4%  Diabetes 6.5% or higher - adopted from ADA   consensus guidelines.     Basic metabolic panel  (Ca, Cl, CO2, Creat, Gluc, K, Na, BUN)   Result Value Ref Range    Sodium 138 133 - 144 mmol/L    Potassium 3.9 3.4 - 5.3 mmol/L    Chloride 105 94 - 109 mmol/L    Carbon Dioxide 28 20 - 32 mmol/L    Anion Gap 5 3 - 14 mmol/L    Glucose 134 (H) 70 - 99 mg/dL    Urea Nitrogen 6 (L) 7 - 30 mg/dL    Creatinine 0.62 0.52 - 1.04 mg/dL    GFR Estimate >90 >60 mL/min/[1.73_m2]      Comment:      Non  GFR Calc  Starting 12/18/2018, serum creatinine based estimated GFR (eGFR) will be   calculated using the Chronic Kidney Disease Epidemiology Collaboration   (CKD-EPI) equation.      GFR Estimate If Black >90 >60 mL/min/[1.73_m2]      Comment:       GFR Calc  Starting 12/18/2018, serum creatinine based estimated GFR (eGFR) will be   calculated using the Chronic Kidney Disease Epidemiology Collaboration   (CKD-EPI) equation.      Calcium 8.9 8.5 - 10.1 mg/dL   CBC with platelets   Result Value Ref Range    WBC 6.3 4.0 - 11.0 10e9/L    RBC Count 4.54 3.8 - 5.2 10e12/L    Hemoglobin 12.8 11.7 - 15.7 g/dL    Hematocrit 39.7 35.0 - 47.0 %    MCV 87 78 - 100 fl    MCH 28.2 26.5 - 33.0 pg    MCHC 32.2 31.5 - 36.5 g/dL    RDW 13.0 10.0 - 15.0 %    Platelet Count 205 150 - 450 10e9/L       If you have any questions or concerns, please call the clinic at the number listed above.       Sincerely,        ASTRID Martin CNP

## 2020-08-26 NOTE — PROGRESS NOTES
[facility]  [address]  [facility] [address]  Phone: [phone]  Primary Provider: Julia Reyna  Pre-op Performing Provider: ROBERT ROCA    PREOPERATIVE EVALUATION:  Today's date: 8/26/2020    May Wade is a 49 year old female who presents for a preoperative evaluation.    Surgical Information:  Surgery Details 8/26/2020   Surgery/Procedure: Right shoulder arthroscopy, subachomial decompression, distal clavicle excision   Surgery Location: [facility]   Surgeon: Dr. David Perez   Surgery Date: 8/27/2020   Time of Surgery: 6am   Where patient plans to recover: At home with family   Additional recovery plan details: N/A     Fax number for surgical facility: [phone]  Type of Anesthesia Anticipated: General    Subjective     HPI related to upcoming procedure: has had ongoing and worsening right shoulder pain.    Preop Questions 8/26/2020   Have you ever had a heart attack or stroke? No   Have you ever had surgery on your heart or blood vessels, such as a stent placement, a coronary artery bypass, or surgery on an artery in your head, neck, heart, or legs? No   Do you have chest pain with activity? No   Do you have a history of  heart failure? No   Do you currently have a cold, bronchitis or symptoms of other infection? No   Do you have a cough, shortness of breath, or wheezing? No   Do you or anyone in your family have previous history of blood clots? No   Do you or does anyone in your family have a serious bleeding problem such as prolonged bleeding following surgeries or cuts? No   Have you ever had problems with anemia or been told to take iron pills? No   Have you had any abnormal blood loss such as black, tarry or bloody stools, or abnormal vaginal bleeding? No   Have you ever had a blood transfusion? No   Are you willing to have a blood transfusion if it is medically needed before, during, or after your surgery? Yes   Have you or any of your  relatives ever had problems with anesthesia? No   Do you have sleep apnea, excessive snoring or daytime drowsiness? No   Do you have any artifical heart valves or other implanted medical devices like a pacemaker, defibrillator, or continuous glucose monitor? No   Do you have artificial joints? No   Are you allergic to latex? No   Is there any chance that you may be pregnant? No     Patient does not have a Health Care Directive or Living Will:     RX monitoring program ([place]) reviewed:     See problem list for active medical problems.  Problems all longstanding and stable, except as noted/documented.  See ROS for pertinent symptoms related to these conditions.      Review of Systems  CONSTITUTIONAL: NEGATIVE for fever, chills, change in weight  INTEGUMENTARY/SKIN: NEGATIVE for worrisome rashes, moles or lesions  EYES: NEGATIVE for vision changes or irritation  ENT/MOUTH: NEGATIVE for ear, mouth and throat problems  RESP: NEGATIVE for significant cough or SOB  CV: NEGATIVE for chest pain, palpitations or peripheral edema  GI: NEGATIVE for nausea, abdominal pain, heartburn, or change in bowel habits  : NEGATIVE for frequency, dysuria, or hematuria  MUSCULOSKELETAL: NEGATIVE for significant arthralgias or myalgia POSITIVE for right shoulder pain  NEURO: NEGATIVE for weakness, dizziness or paresthesias  ENDOCRINE: NEGATIVE for temperature intolerance, skin/hair changes  HEME: NEGATIVE for bleeding problems  PSYCHIATRIC: NEGATIVE for changes in mood or affect    Patient Active Problem List    Diagnosis Date Noted     Type 2 diabetes mellitus with microalbuminuria, without long-term current use of insulin (H) 02/07/2018     Priority: Medium     overweight BMI >30 10/18/2015     Priority: Medium     Esophageal reflux 02/17/2014     Priority: Medium     HTN, goal below 140/90 12/09/2013     Priority: Medium     Hypertriglyceridemia 10/26/2010     Priority: Medium     Hyperlipidemia LDL goal <100 10/26/2010     Priority:  Medium      Past Medical History:   Diagnosis Date     cervical dysplasia 1990    s/p cryo, normal since     Chronic hip pain 10/25/2006    neurontin     gestational diabetes     pregnancy # 2     Migraines     on amitryptiline     Pure hypercholesterolemia     on lipitor     Past Surgical History:   Procedure Laterality Date     COLONOSCOPY N/A 6/3/2019    Procedure: COLONOSCOPY, WITH POLYPECTOMY AND BIOPSY;  Surgeon: Live Cao MD;  Location: [facility]     CRYOTHERAPY, CERVICAL  1990     HC HYSTEROSCOPY, SURGICAL; W/ ENDOMETRIAL ABLATION, ANY METHOD  2/7/08    Novasure     SURGICAL HISTORY OF -   6/17/2004    Left wrist dorsal ganglion cyst excision-Removal of the recurrent dorsal boss.     SURGICAL HISTORY OF -   1994    Right Ankle Surgery (Ganglion Cyst)     SURGICAL HISTORY OF -       Breast Reduction     SURGICAL HISTORY OF -       Abdominal Wall Revision     SURGICAL HISTORY OF -   5/22/2003    Left Ankle Cyst Removal     SURGICAL HISTORY OF -   2001    Left ankle surgery for ganglion cyst,      SURGICAL HISTORY OF -       Left wrist bone and cyst surgery     Current Outpatient Medications   Medication Sig Dispense Refill     blood glucose (ACCU-CHEK SANDRA PLUS) test strip Use to test blood sugar 2 times daily or as directed.  Ok to substitute alternative if insurance prefers. 200 strip 11     blood glucose monitoring (YUNIER CONTOUR MONITOR) meter device kit Use to test blood sugars 2 times daily or as directed. 1 kit 0     blood glucose monitoring (NO BRAND SPECIFIED) test strip 1 strip by In Vitro route 2 times daily **now using Yunier Contour Next** 1 Box 11     blood glucose monitoring (ONE TOUCH ULTRA 2) meter device kit Use to test blood sugars 4 daily as directed. 1 kit 0     fish oil-omega-3 fatty acids (OMEGA 3) 1000 MG capsule Take 2 capsules by mouth 2 times daily. 360 capsule 3     JARDIANCE 25 MG TABS tablet TAKE ONE TABLET BY MOUTH ONCE DAILY 90 tablet 1     losartan (COZAAR) 25 MG tablet Take 1  "tablet (25 mg) by mouth daily 90 tablet 1     metFORMIN (GLUCOPHAGE-XR) 500 MG 24 hr tablet Take 2 tablets (1,000 mg) by mouth 2 times daily (with meals) 360 tablet 1     omeprazole (PRILOSEC) 20 MG DR capsule TAKE ONE CAPSULE BY MOUTH DAILY. TAKE 30 TO 60 MINUTES BEFORE A MEAL 90 capsule 3     OZEMPIC, 0.25 OR 0.5 MG/DOSE, 2 MG/1.5ML SOPN INJECT 0.25MG SUBCUTANEOUSLY WEEKLY FOR TWO WEEKS, THEN INCREASE TO INJECT 0.5MG SUBCUTANEOUSLY WEEKLY (Patient taking differently: Inject 0.5 mg Subcutaneous once a week ) 4.5 mL 1     simvastatin (ZOCOR) 40 MG tablet Take 1 tablet (40 mg) by mouth At Bedtime 90 tablet 3       Allergies   Allergen Reactions     Penicillins Nausea and Vomiting     Aspartame Other (See Comments)     Severe pain, passing out     Benzoin Dermatitis     Tincture of benzoin - skin burn/rash         Social History     Tobacco Use     Smoking status: Passive Smoke Exposure - Never Smoker     Smokeless tobacco: Never Used   Substance Use Topics     Alcohol use: Yes     Comment: 1 drink every 2 weeks     Family History   Problem Relation Age of Onset     C.A.D. Father          age 51     Respiratory Father         smoker     Cerebrovascular Disease Maternal Grandmother      Hypertension Maternal Grandmother      C.A.D. Paternal Grandfather      Asthma No family hx of      Diabetes No family hx of      Breast Cancer No family hx of      Cancer - colorectal No family hx of      Prostate Cancer No family hx of      History   Drug Use No            Objective   /70 (BP Location: Right arm, Patient Position: Chair, Cuff Size: Adult Regular)   Pulse 88   Temp 97.9  F (36.6  C) (Tympanic)   Resp 16   Ht 1.486 m (4' 10.5\")   Wt 60.8 kg (134 lb)   LMP  (LMP Unknown)   SpO2 99%   BMI 27.53 kg/m    Physical Exam    GENERAL APPEARANCE: healthy, alert and no distress     EYES: grossly normal     NECK: no adenopathy, no asymmetry, masses, or scars and thyroid normal to palpation     RESP: lungs clear to " auscultation - no rales, rhonchi or wheezes     CV: regular rates and rhythm, normal S1 S2, no S3 or S4 and no murmur, click or rub     ABDOMEN:  soft, nontender, no HSM or masses and bowel sounds normal     MS: extremities normal- no gross deformities noted     SKIN: no suspicious lesions or rashes     NEURO: Normal strength and tone, sensory exam grossly normal, mentation intact and speech normal     PSYCH: mentation appears normal. and affect normal/bright     LYMPHATICS: No cervical adenopathy    Recent Labs   Lab Test 02/14/20  1037 08/09/19  1335    134   POTASSIUM 4.1 3.8   CR 0.66 0.61   A1C 6.1* 6.6*        PRE-OP Diagnostics:  Recent Results (from the past 24 hour(s))   Hemoglobin A1c    Collection Time: 08/26/20 11:37 AM   Result Value Ref Range    Hemoglobin A1C 6.6 (H) 0 - 5.6 %   CBC with platelets    Collection Time: 08/26/20 11:37 AM   Result Value Ref Range    WBC 6.3 4.0 - 11.0 10e9/L    RBC Count 4.54 3.8 - 5.2 10e12/L    Hemoglobin 12.8 11.7 - 15.7 g/dL    Hematocrit 39.7 35.0 - 47.0 %    MCV 87 78 - 100 fl    MCH 28.2 26.5 - 33.0 pg    MCHC 32.2 31.5 - 36.5 g/dL    RDW 13.0 10.0 - 15.0 %    Platelet Count 205 150 - 450 10e9/L     No EKG required, no history of coronary heart disease, significant arrhythmia, peripheral arterial disease or other structural heart disease.         Assessment & Plan   The proposed surgical procedure is considered INTERMEDIATE risk.    REVISED CARDIAC RISK INDEX  The patient has the following serious cardiovascular risks for perioperative complications:  No serious cardiac risks = 0 points    INTERPRETATION: 0 points: Class I (very low risk - 0.4% complication rate)       1. Pre-operative examination    - Basic metabolic panel  (Ca, Cl, CO2, Creat, Gluc, K, Na, BUN)  - CBC with platelets    2. Right shoulder pain, unspecified chronicity      3. Type 2 diabetes mellitus with microalbuminuria, without long-term current use of insulin (H)    - Hemoglobin  A1c  Controlled.    The patient has the following additional risks and recommendations for perioperative complications:     - Consult Hospitalist / IM to assist with post-op medical management     MEDICATION INSTRUCTIONS:    DIABETIC Medications:   - METFORMIN: HOLD day of surgery.    RECOMMENDATION:  APPROVAL GIVEN to proceed with proposed procedure, without further diagnostic evaluation.    Return in about 6 months (around 2/26/2021) for or sooner if symptoms persist or worsen, Routine Visit, Med Check, Lab Work.    Signed Electronically by: ASTRID Martin CNP    Copy of this evaluation report is provided to requesting physician.    [facility] Preop Guidelines    Revised Cardiac Risk Index

## 2020-09-05 DIAGNOSIS — R80.9 TYPE 2 DIABETES MELLITUS WITH MICROALBUMINURIA, WITHOUT LONG-TERM CURRENT USE OF INSULIN (H): ICD-10-CM

## 2020-09-05 DIAGNOSIS — E11.29 TYPE 2 DIABETES MELLITUS WITH MICROALBUMINURIA, WITHOUT LONG-TERM CURRENT USE OF INSULIN (H): ICD-10-CM

## 2020-09-08 RX ORDER — METFORMIN HCL 500 MG
TABLET, EXTENDED RELEASE 24 HR ORAL
Qty: 360 TABLET | Refills: 1 | Status: SHIPPED | OUTPATIENT
Start: 2020-09-08 | End: 2021-03-19

## 2020-09-13 DIAGNOSIS — R05.9 COUGH: ICD-10-CM

## 2020-09-13 DIAGNOSIS — R80.9 TYPE 2 DIABETES MELLITUS WITH MICROALBUMINURIA, WITHOUT LONG-TERM CURRENT USE OF INSULIN (H): ICD-10-CM

## 2020-09-13 DIAGNOSIS — E78.5 HYPERLIPIDEMIA LDL GOAL <100: ICD-10-CM

## 2020-09-13 DIAGNOSIS — E11.29 TYPE 2 DIABETES MELLITUS WITH MICROALBUMINURIA, WITHOUT LONG-TERM CURRENT USE OF INSULIN (H): ICD-10-CM

## 2020-09-13 DIAGNOSIS — I10 HTN, GOAL BELOW 140/90: ICD-10-CM

## 2020-09-14 RX ORDER — SIMVASTATIN 40 MG
TABLET ORAL
Qty: 90 TABLET | Refills: 1 | Status: SHIPPED | OUTPATIENT
Start: 2020-09-14 | End: 2021-03-15

## 2020-09-14 RX ORDER — LOSARTAN POTASSIUM 25 MG/1
TABLET ORAL
Qty: 90 TABLET | Refills: 1 | Status: SHIPPED | OUTPATIENT
Start: 2020-09-14 | End: 2021-03-15

## 2020-09-14 NOTE — TELEPHONE ENCOUNTER
"Requested Prescriptions   Pending Prescriptions Disp Refills     losartan (COZAAR) 25 MG tablet [Pharmacy Med Name: LOSARTAN POTASSIUM 25MG TABS] 90 tablet 1     Sig: TAKE ONE TABLET BY MOUTH ONCE DAILY       Angiotensin-II Receptors Passed - 9/13/2020  9:56 PM        Passed - Last blood pressure under 140/90 in past 12 months     BP Readings from Last 3 Encounters:   08/26/20 102/70   02/14/20 114/60   10/29/19 128/84                 Passed - Recent (12 mo) or future (30 days) visit within the authorizing provider's specialty     Patient has had an office visit with the authorizing provider or a provider within the authorizing providers department within the previous 12 mos or has a future within next 30 days. See \"Patient Info\" tab in inbasket, or \"Choose Columns\" in Meds & Orders section of the refill encounter.              Passed - Medication is active on med list        Passed - Patient is age 18 or older        Passed - No active pregnancy on record        Passed - Normal serum creatinine on file in past 12 months     Recent Labs   Lab Test 08/26/20  1137   CR 0.62       Ok to refill medication if creatinine is low          Passed - Normal serum potassium on file in past 12 months     Recent Labs   Lab Test 08/26/20  1137   POTASSIUM 3.9                    Passed - No positive pregnancy test in past 12 months             "

## 2020-09-14 NOTE — TELEPHONE ENCOUNTER
Prescription approved per [facility] [place].    Agnes ROD RN, BSN

## 2020-09-14 NOTE — TELEPHONE ENCOUNTER
"Requested Prescriptions   Pending Prescriptions Disp Refills     simvastatin (ZOCOR) 40 MG tablet [Pharmacy Med Name: SIMVASTATIN 40MG TABS] 90 tablet 3     Sig: TAKE ONE TABLET BY MOUTH AT BEDTIME       Statins Protocol Passed - 9/13/2020  9:56 PM        Passed - LDL on file in past 12 months     Recent Labs   Lab Test 02/14/20  1037   LDL 38             Passed - No abnormal creatine kinase in past 12 months     No lab results found.             Passed - Recent (12 mo) or future (30 days) visit within the authorizing provider's specialty     Patient has had an office visit with the authorizing provider or a provider within the authorizing providers department within the previous 12 mos or has a future within next 30 days. See \"Patient Info\" tab in inInvesharesket, or \"Choose Columns\" in Meds & Orders section of the refill encounter.              Passed - Medication is active on med list        Passed - Patient is age 18 or older        Passed - No active pregnancy on record        Passed - No positive pregnancy test in past 12 months           omeprazole (PRILOSEC) 20 MG DR capsule [Pharmacy Med Name: OMEPRAZOLE 20MG CPDR] 90 capsule 3     Sig: TAKE ONE CAPSULE BY MOUTH ONCE DAILY 30 TO 60 MINUTES BEFORE A MEAL       PPI Protocol Passed - 9/13/2020  9:56 PM        Passed - Not on Clopidogrel (unless Pantoprazole ordered)        Passed - No diagnosis of osteoporosis on record        Passed - Recent (12 mo) or future (30 days) visit within the authorizing provider's specialty     Patient has had an office visit with the authorizing provider or a provider within the authorizing providers department within the previous 12 mos or has a future within next 30 days. See \"Patient Info\" tab in inInvesharesket, or \"Choose Columns\" in Meds & Orders section of the refill encounter.              Passed - Medication is active on med list        Passed - Patient is age 18 or older        Passed - No active pregnacy on record        Passed - No " positive pregnancy test in past 12 months

## 2020-11-09 ENCOUNTER — VIRTUAL VISIT (OUTPATIENT)
Dept: FAMILY MEDICINE | Facility: CLINIC | Age: 50
End: 2020-11-09
Payer: COMMERCIAL

## 2020-11-09 DIAGNOSIS — Z20.822 ENCOUNTER FOR LABORATORY TESTING FOR COVID-19 VIRUS: Primary | ICD-10-CM

## 2020-11-09 DIAGNOSIS — Z20.822 EXPOSURE TO COVID-19 VIRUS: ICD-10-CM

## 2020-11-09 PROCEDURE — 99213 OFFICE O/P EST LOW 20 MIN: CPT | Mod: TEL | Performed by: NURSE PRACTITIONER

## 2020-11-09 NOTE — PROGRESS NOTES
"May Wade is a 49 year old female who is being evaluated via a billable telephone visit.      The patient has been notified of following:     \"This telephone visit will be conducted via a call between you and your physician/provider. We have found that certain health care needs can be provided without the need for a physical exam.  This service lets us provide the care you need with a short phone conversation.  If a prescription is necessary we can send it directly to your pharmacy.  If lab work is needed we can place an order for that and you can then stop by our lab to have the test done at a later time.    Telephone visits are billed at different rates depending on your insurance coverage. During this emergency period, for some insurers they may be billed the same as an in-person visit.  Please reach out to your insurance provider with any questions.    If during the course of the call the physician/provider feels a telephone visit is not appropriate, you will not be charged for this service.\"    Patient has given verbal consent for Telephone visit?  Yes    What phone number would you like to be contacted at? [phone]    How would you like to obtain your AVS? Teresa    Subjective     May Wade is a 49 year old female who presents via phone visit today for the following health issues:    HPI     Requesting COVID testing due to an exposure. Unable to go back to work until she is tested.    Blanche Lindsey, [facility]    Additional provider notes: She was exposed last Monday and Tuesday at work. Boss tested positive over the weekend. They were all eating together and didn't have masks on. Denies symptoms. Requesting testing for her whole family. Discussed that testing is only needed for those with actual exposure to COVID + person. If she comes up positive, then family will be able to be tested. Referred to [facility] website for other testing options if she still wants family tested.     Review of Systems " "  All other systems reviewed and are negative.            Objective          Vitals:  No vitals were obtained today due to virtual visit.    healthy, alert, no distress and cooperative  PSYCH: Alert and oriented times 3; coherent speech, normal   rate and volume, able to articulate logical thoughts, able   to abstract reason, no tangential thoughts, no hallucinations   or delusions  Her affect is normal and pleasant  RESP: No cough, no audible wheezing, able to talk in full sentences  Remainder of exam unable to be completed due to telephone visits            Assessment/Plan:    Assessment & Plan     Encounter for laboratory testing for COVID-19 virus  Exposure consistent with [facility] testing guidelines. Discussed quarantining recommendations. Discussed process for scheduling COVID testing. Recommended ER visit if symptoms worsen and/or can't be managed at home.     - Asymptomatic COVID-19 Virus (Coronavirus) by PCR; Future    Exposure to COVID-19 virus  Exposure was 6-7 days ago.     - Asymptomatic COVID-19 Virus (Coronavirus) by PCR; Future     BMI:   Estimated body mass index is 27.53 kg/m  as calculated from the following:    Height as of 8/26/20: 1.486 m (4' 10.5\").    Weight as of 8/26/20: 60.8 kg (134 lb).            Patient Instructions   COVID testing ordered today. You will receive a call from a blocked number to schedule that appointment.     Please quarantine until you receive your results.     If the results are negative, you need to be fever free for 24 hours before ending quarantine.     If your results are negative, but you have had exposure with a COVID positive person, you need to quarantine for 14 days from the date of your last exposure to that person.     If results are positive, you need to quarantine for 10 days from start of symptoms AND you need to be fever free (without the use of fever reducing medications) for 24 hours before ending quarantine.     If results are positive, asymptomatic " household contacts will need to quarantine for 14 days.     If you develop worsening symptoms or difficulty breathing, please go to ER.        Return if symptoms worsen or fail to improve.    ASTRID Corado CNP  [facility]    Phone call duration:  9 minutes

## 2020-11-09 NOTE — PATIENT INSTRUCTIONS
COVID testing ordered today. You will receive a call from a blocked number to schedule that appointment.     Please quarantine until you receive your results.     If the results are negative, you need to be fever free for 24 hours before ending quarantine.     If your results are negative, but you have had exposure with a COVID positive person, you need to quarantine for 14 days from the date of your last exposure to that person.     If results are positive, you need to quarantine for 10 days from start of symptoms AND you need to be fever free (without the use of fever reducing medications) for 24 hours before ending quarantine.     If results are positive, asymptomatic household contacts will need to quarantine for 14 days.     If you develop worsening symptoms or difficulty breathing, please go to ER.

## 2021-02-08 DIAGNOSIS — R80.9 TYPE 2 DIABETES MELLITUS WITH MICROALBUMINURIA, WITHOUT LONG-TERM CURRENT USE OF INSULIN (H): ICD-10-CM

## 2021-02-08 DIAGNOSIS — E11.29 TYPE 2 DIABETES MELLITUS WITH MICROALBUMINURIA, WITHOUT LONG-TERM CURRENT USE OF INSULIN (H): ICD-10-CM

## 2021-02-09 RX ORDER — EMPAGLIFLOZIN 25 MG/1
TABLET, FILM COATED ORAL
Qty: 90 TABLET | Refills: 0 | Status: SHIPPED | OUTPATIENT
Start: 2021-02-09 | End: 2021-03-19

## 2021-02-09 NOTE — TELEPHONE ENCOUNTER
"Requested Prescriptions   Pending Prescriptions Disp Refills     JARDIANCE 25 MG TABS tablet [Pharmacy Med Name: JARDIANCE 25MG TABS] 90 tablet 1     Sig: TAKE ONE TABLET BY MOUTH ONCE DAILY       Sodium Glucose Co-Transport Inhibitor Agents Passed - 2/8/2021  6:03 PM        Passed - Patient has documented A1c within the specified period of time.     If HgbA1C is 8 or greater, it needs to be on file within the past 3 months.  If less than 8, must be on file within the past 6 months.     Recent Labs   Lab Test 08/26/20  1137   A1C 6.6*             Passed - No creatinine >1.4 or GFR <45 within the past 12 mos     Recent Labs   Lab Test 08/26/20  1137   GFRESTIMATED >90   GFRESTBLACK >90       Recent Labs   Lab Test 08/26/20  1137   CR 0.62             Passed - Medication is active on med list        Passed - Patient is age 18 or older        Passed - Patient is not pregnant        Passed - Patient has documented normal Potassium within the last 12 mos.     Recent Labs   Lab Test 08/26/20  1137   POTASSIUM 3.9             Passed - Patient has no positive pregnancy test within the past 12 mos.        Passed - Recent (6 mo) or future (30 days) visit within the authorizing provider's specialty     Patient had office visit in the last 6 months or has a visit in the next 30 days with authorizing provider or within the authorizing provider's specialty.  See \"Patient Info\" tab in inbasket, or \"Choose Columns\" in Meds & Orders section of the refill encounter.                 "

## 2021-03-12 DIAGNOSIS — R05.9 COUGH: ICD-10-CM

## 2021-03-12 DIAGNOSIS — E78.5 HYPERLIPIDEMIA LDL GOAL <100: ICD-10-CM

## 2021-03-12 DIAGNOSIS — R80.9 TYPE 2 DIABETES MELLITUS WITH MICROALBUMINURIA, WITHOUT LONG-TERM CURRENT USE OF INSULIN (H): ICD-10-CM

## 2021-03-12 DIAGNOSIS — I10 HTN, GOAL BELOW 140/90: ICD-10-CM

## 2021-03-12 DIAGNOSIS — E11.29 TYPE 2 DIABETES MELLITUS WITH MICROALBUMINURIA, WITHOUT LONG-TERM CURRENT USE OF INSULIN (H): ICD-10-CM

## 2021-03-15 RX ORDER — LOSARTAN POTASSIUM 25 MG/1
TABLET ORAL
Qty: 30 TABLET | Refills: 0 | Status: SHIPPED | OUTPATIENT
Start: 2021-03-15 | End: 2021-03-19

## 2021-03-15 RX ORDER — SIMVASTATIN 40 MG
TABLET ORAL
Qty: 30 TABLET | Refills: 0 | Status: SHIPPED | OUTPATIENT
Start: 2021-03-15 | End: 2021-03-19

## 2021-03-15 NOTE — TELEPHONE ENCOUNTER
Medications are being filled for 1 time refill only due to:  Patient needs to be seen because needs to be seen for several things..   MyChart sent.    Agnes ROD RN, BSN

## 2021-03-15 NOTE — TELEPHONE ENCOUNTER
"Requested Prescriptions   Pending Prescriptions Disp Refills     losartan (COZAAR) 25 MG tablet [Pharmacy Med Name: LOSARTAN POTASSIUM 25MG TABS] 90 tablet 1     Sig: TAKE ONE TABLET BY MOUTH ONCE DAILY       Angiotensin-II Receptors Passed - 3/12/2021  8:47 PM        Passed - Last blood pressure under 140/90 in past 12 months     BP Readings from Last 3 Encounters:   08/26/20 102/70   02/14/20 114/60   10/29/19 128/84                 Passed - Recent (12 mo) or future (30 days) visit within the authorizing provider's specialty     Patient has had an office visit with the authorizing provider or a provider within the authorizing providers department within the previous 12 mos or has a future within next 30 days. See \"Patient Info\" tab in inbasket, or \"Choose Columns\" in Meds & Orders section of the refill encounter.              Passed - Medication is active on med list        Passed - Patient is age 18 or older        Passed - No active pregnancy on record        Passed - Normal serum creatinine on file in past 12 months     Recent Labs   Lab Test 08/26/20  1137   CR 0.62       Ok to refill medication if creatinine is low          Passed - Normal serum potassium on file in past 12 months     Recent Labs   Lab Test 08/26/20  1137   POTASSIUM 3.9                    Passed - No positive pregnancy test in past 12 months           simvastatin (ZOCOR) 40 MG tablet [Pharmacy Med Name: SIMVASTATIN 40MG TABS] 90 tablet 1     Sig: TAKE ONE TABLET BY MOUTH EVERY NIGHT AT BEDTIME       Statins Protocol Failed - 3/12/2021  8:47 PM        Failed - LDL on file in past 12 months     Recent Labs   Lab Test 02/14/20  1037   LDL 38             Passed - No abnormal creatine kinase in past 12 months     No lab results found.             Passed - Recent (12 mo) or future (30 days) visit within the authorizing provider's specialty     Patient has had an office visit with the authorizing provider or a provider within the authorizing " "providers department within the previous 12 mos or has a future within next 30 days. See \"Patient Info\" tab in inbasket, or \"Choose Columns\" in Meds & Orders section of the refill encounter.              Passed - Medication is active on med list        Passed - Patient is age 18 or older        Passed - No active pregnancy on record        Passed - No positive pregnancy test in past 12 months           omeprazole (PRILOSEC) 20 MG DR capsule [Pharmacy Med Name: OMEPRAZOLE 20MG CPDR] 90 capsule 1     Sig: TAKE ONE CAPSULE BY MOUTH ONCE DAILY (TAKE 30 TO 60 MINUTES BEFORE A MEAL)       PPI Protocol Passed - 3/12/2021  8:47 PM        Passed - Not on Clopidogrel (unless Pantoprazole ordered)        Passed - No diagnosis of osteoporosis on record        Passed - Recent (12 mo) or future (30 days) visit within the authorizing provider's specialty     Patient has had an office visit with the authorizing provider or a provider within the authorizing providers department within the previous 12 mos or has a future within next 30 days. See \"Patient Info\" tab in inbasket, or \"Choose Columns\" in Meds & Orders section of the refill encounter.              Passed - Medication is active on med list        Passed - Patient is age 18 or older        Passed - No active pregnacy on record        Passed - No positive pregnancy test in past 12 months             "

## 2021-03-17 ENCOUNTER — TELEPHONE (OUTPATIENT)
Dept: EDUCATION SERVICES | Facility: CLINIC | Age: 51
End: 2021-03-17

## 2021-03-17 DIAGNOSIS — E11.29 TYPE 2 DIABETES MELLITUS WITH MICROALBUMINURIA, WITHOUT LONG-TERM CURRENT USE OF INSULIN (H): ICD-10-CM

## 2021-03-17 DIAGNOSIS — R80.9 TYPE 2 DIABETES MELLITUS WITH MICROALBUMINURIA, WITHOUT LONG-TERM CURRENT USE OF INSULIN (H): ICD-10-CM

## 2021-03-17 RX ORDER — SEMAGLUTIDE 1.34 MG/ML
0.5 INJECTION, SOLUTION SUBCUTANEOUS WEEKLY
Qty: 4.5 ML | Refills: 0 | Status: SHIPPED | OUTPATIENT
Start: 2021-03-17 | End: 2021-03-19

## 2021-03-17 NOTE — TELEPHONE ENCOUNTER
"Requested Prescriptions   Pending Prescriptions Disp Refills     OZEMPIC (0.25 OR 0.5 MG/DOSE) 2 MG/1.5ML SOPN [Pharmacy Med Name: OZEMPIC 0.25 OR 0.5MG/DOSE (1 PEN/BOX)]  1     Sig: INJECT 0.5 MG SUBCUTANEOUS ONCE A WEEK       GLP-1 Agonists Protocol Failed - 3/17/2021 11:31 AM        Failed - HgbA1C in past 3 or 6 months     If HgbA1C is 8 or greater, it needs to be on file within the past 3 months.  If less than 8, must be on file within the past 6 months.     Recent Labs   Lab Test 08/26/20  1137   A1C 6.6*             Failed - Recent (6 mo) or future (30 days) visit within the authorizing provider's specialty     Patient had office visit in the last 6 months or has a visit in the next 30 days with authorizing provider.  See \"Patient Info\" tab in inbasket, or \"Choose Columns\" in Meds & Orders section of the refill encounter.            Passed - Medication is active on med list        Passed - Patient is age 18 or older        Passed - No active pregnancy on record        Passed - Normal serum creatinine on file in past 12 months     Recent Labs   Lab Test 08/26/20  1137   CR 0.62       Ok to refill medication if creatinine is low          Passed - No positive pregnancy test in past 12 months             "

## 2021-03-19 ENCOUNTER — TELEPHONE (OUTPATIENT)
Dept: FAMILY MEDICINE | Facility: CLINIC | Age: 51
End: 2021-03-19

## 2021-03-19 ENCOUNTER — OFFICE VISIT (OUTPATIENT)
Dept: FAMILY MEDICINE | Facility: CLINIC | Age: 51
End: 2021-03-19
Payer: COMMERCIAL

## 2021-03-19 VITALS
DIASTOLIC BLOOD PRESSURE: 68 MMHG | RESPIRATION RATE: 16 BRPM | HEART RATE: 92 BPM | HEIGHT: 59 IN | WEIGHT: 139 LBS | TEMPERATURE: 97.8 F | SYSTOLIC BLOOD PRESSURE: 100 MMHG | BODY MASS INDEX: 28.02 KG/M2 | OXYGEN SATURATION: 100 %

## 2021-03-19 DIAGNOSIS — E11.29 TYPE 2 DIABETES MELLITUS WITH MICROALBUMINURIA, WITHOUT LONG-TERM CURRENT USE OF INSULIN (H): Primary | ICD-10-CM

## 2021-03-19 DIAGNOSIS — R80.9 TYPE 2 DIABETES MELLITUS WITH MICROALBUMINURIA, WITHOUT LONG-TERM CURRENT USE OF INSULIN (H): Primary | ICD-10-CM

## 2021-03-19 DIAGNOSIS — R05.9 COUGH: ICD-10-CM

## 2021-03-19 DIAGNOSIS — I10 HTN, GOAL BELOW 140/90: ICD-10-CM

## 2021-03-19 DIAGNOSIS — E78.5 HYPERLIPIDEMIA LDL GOAL <100: ICD-10-CM

## 2021-03-19 LAB
ANION GAP SERPL CALCULATED.3IONS-SCNC: 7 MMOL/L (ref 3–14)
BUN SERPL-MCNC: 13 MG/DL (ref 7–30)
CALCIUM SERPL-MCNC: 9 MG/DL (ref 8.5–10.1)
CHLORIDE SERPL-SCNC: 104 MMOL/L (ref 94–109)
CHOLEST SERPL-MCNC: 142 MG/DL
CO2 SERPL-SCNC: 29 MMOL/L (ref 20–32)
CREAT SERPL-MCNC: 0.64 MG/DL (ref 0.52–1.04)
CREAT UR-MCNC: 57 MG/DL
GFR SERPL CREATININE-BSD FRML MDRD: >90 ML/MIN/{1.73_M2}
GLUCOSE SERPL-MCNC: 222 MG/DL (ref 70–99)
HBA1C MFR BLD: 6.6 % (ref 0–5.6)
HDLC SERPL-MCNC: 48 MG/DL
LDLC SERPL CALC-MCNC: 42 MG/DL
MICROALBUMIN UR-MCNC: 6 MG/L
MICROALBUMIN/CREAT UR: 11.41 MG/G CR (ref 0–25)
NONHDLC SERPL-MCNC: 94 MG/DL
POTASSIUM SERPL-SCNC: 4 MMOL/L (ref 3.4–5.3)
SODIUM SERPL-SCNC: 140 MMOL/L (ref 133–144)
TRIGL SERPL-MCNC: 259 MG/DL

## 2021-03-19 PROCEDURE — 82043 UR ALBUMIN QUANTITATIVE: CPT | Performed by: FAMILY MEDICINE

## 2021-03-19 PROCEDURE — 80061 LIPID PANEL: CPT | Performed by: FAMILY MEDICINE

## 2021-03-19 PROCEDURE — 99214 OFFICE O/P EST MOD 30 MIN: CPT | Performed by: FAMILY MEDICINE

## 2021-03-19 PROCEDURE — 83036 HEMOGLOBIN GLYCOSYLATED A1C: CPT | Performed by: FAMILY MEDICINE

## 2021-03-19 PROCEDURE — 80048 BASIC METABOLIC PNL TOTAL CA: CPT | Performed by: FAMILY MEDICINE

## 2021-03-19 PROCEDURE — 36415 COLL VENOUS BLD VENIPUNCTURE: CPT | Performed by: FAMILY MEDICINE

## 2021-03-19 RX ORDER — SIMVASTATIN 40 MG
TABLET ORAL
Qty: 90 TABLET | Refills: 3 | Status: SHIPPED | OUTPATIENT
Start: 2021-03-19 | End: 2022-03-23

## 2021-03-19 RX ORDER — LOSARTAN POTASSIUM 25 MG/1
25 TABLET ORAL DAILY
Qty: 90 TABLET | Refills: 3 | Status: SHIPPED | OUTPATIENT
Start: 2021-03-19 | End: 2022-03-23

## 2021-03-19 RX ORDER — METFORMIN HCL 500 MG
TABLET, EXTENDED RELEASE 24 HR ORAL
Qty: 360 TABLET | Refills: 1 | Status: SHIPPED | OUTPATIENT
Start: 2021-03-19 | End: 2022-02-04

## 2021-03-19 RX ORDER — SEMAGLUTIDE 1.34 MG/ML
0.5 INJECTION, SOLUTION SUBCUTANEOUS WEEKLY
Qty: 4.5 ML | Refills: 1 | Status: SHIPPED | OUTPATIENT
Start: 2021-03-19 | End: 2021-12-07

## 2021-03-19 ASSESSMENT — PAIN SCALES - GENERAL: PAINLEVEL: NO PAIN (0)

## 2021-03-19 ASSESSMENT — MIFFLIN-ST. JEOR: SCORE: 1138.19

## 2021-03-19 NOTE — PATIENT INSTRUCTIONS
Health Maintenance reviewed and plan for update discussed.  Patient asked to schedule her pap smear  Patient asked to schedule her mammogram         Our Clinic hours are:  Mondays    7:20 am - 7 pm  Tues -  Fri  7:20 am - 5 pm    Clinic Phone: [phone]    The clinic lab opens at 7:30 am Mon - Fri and appointments are required.    [facility]  Ph. [phone]  Monday  8 am - 7pm  Tues - Fri 8 am - 5:30 pm

## 2021-03-19 NOTE — TELEPHONE ENCOUNTER
Good Afternoon,    We received a prescription for test strips for patient and unfortunately insurance is no longer covering the contour next testing supplies. They will cover Accu-Chek Guide, wondering if we are able to get new perscriptions for a new meter and lancets.     Thank You,  Madie Tucker PhT  [facility] [phone]

## 2021-03-19 NOTE — PROGRESS NOTES
"    Assessment & Plan     Type 2 diabetes mellitus with microalbuminuria, without long-term current use of insulin (H)   well controlled  - Albumin Random Urine Quantitative with Creat Ratio  - Lipid panel reflex to direct LDL Fasting  - Hemoglobin A1c  - Basic metabolic panel  - losartan (COZAAR) 25 MG tablet; Take 1 tablet (25 mg) by mouth daily  - Semaglutide,0.25 or 0.5MG/DOS, (OZEMPIC, 0.25 OR 0.5 MG/DOSE,) 2 MG/1.5ML SOPN; Inject 0.5 mg Subcutaneous once a week  - empagliflozin (JARDIANCE) 25 MG TABS tablet; Take 1 tablet (25 mg) by mouth daily  - metFORMIN (GLUCOPHAGE-XR) 500 MG 24 hr tablet; TAKE TWO TABLETS BY MOUTH TWICE A DAY WITH MEALS  - blood glucose (NO BRAND SPECIFIED) test strip; 1 strip by In Vitro route 2 times daily **now using [employer] Contour Next**    HTN, goal below 140/90  Well controlled  - losartan (COZAAR) 25 MG tablet; Take 1 tablet (25 mg) by mouth daily    Hyperlipidemia LDL goal <100     - simvastatin (ZOCOR) 40 MG tablet; TAKE ONE TABLET BY MOUTH EVERY NIGHT AT BEDTIME    Cough     - omeprazole (PRILOSEC) 20 MG DR capsule; TAKE ONE CAPSULE BY MOUTH ONCE DAILY (TAKE 30 TO 60 MINUTES BEFORE A MEAL)             BMI:   Estimated body mass index is 28.56 kg/m  as calculated from the following:    Height as of this encounter: 1.486 m (4' 10.5\").    Weight as of this encounter: 63 kg (139 lb).           No follow-ups on file.    Julia Reyna MD  [facility]    Rey Olvera is a 50 year old who presents for the following health issues  accompanied by her self:    HPI     Diabetes Follow-up    How often are you checking your blood sugar? One time daily  What time of day are you checking your blood sugars (select all that apply)?  Before and after meals  Have you had any blood sugars above 200?  No  Have you had any blood sugars below 70?  No    What symptoms do you notice when your blood sugar is low?  Shaky and Weak    What concerns do you have today about " "your diabetes? None     Do you have any of these symptoms? (Select all that apply)  No numbness or tingling in feet.  No redness, sores or blisters on feet.  No complaints of excessive thirst.  No reports of blurry vision.  No significant changes to weight.    Have you had a diabetic eye exam in the last 12 months? No                Hyperlipidemia Follow-Up      Are you regularly taking any medication or supplement to lower your cholesterol?   Yes- simvastatin    Are you having muscle aches or other side effects that you think could be caused by your cholesterol lowering medication?  No    Hypertension Follow-up      Do you check your blood pressure regularly outside of the clinic? No     Are you following a low salt diet? Yes    Are your blood pressures ever more than 140 on the top number (systolic) OR more   than 90 on the bottom number (diastolic), for example 140/90? No    BP Readings from Last 2 Encounters:   03/19/21 100/68   08/26/20 102/70     Hemoglobin A1C (%)   Date Value   03/19/2021 6.6 (H)   08/26/2020 6.6 (H)     LDL Cholesterol Calculated (mg/dL)   Date Value   02/14/2020 38   01/21/2019 83         How many servings of fruits and vegetables do you eat daily?  4 or more    On average, how many sweetened beverages do you drink each day (Examples: soda, juice, sweet tea, etc.  Do NOT count diet or artificially sweetened beverages)?   0    How many days per week do you exercise enough to make your heart beat faster? 3 or less    How many minutes a day do you exercise enough to make your heart beat faster? 60 or more    How many days per week do you miss taking your medication? 0        Review of Systems   Constitutional, HEENT, cardiovascular, pulmonary, gi and gu systems are negative, except as otherwise noted.      Objective    /68   Pulse 92   Temp 97.8  F (36.6  C) (Tympanic)   Resp 16   Ht 1.486 m (4' 10.5\")   Wt 63 kg (139 lb)   SpO2 100%   Breastfeeding No   BMI 28.56 kg/m    Body " mass index is 28.56 kg/m .  Physical Exam   GENERAL: healthy, alert and no distress  NECK: no adenopathy, no asymmetry, masses, or scars and thyroid normal to palpation  RESP: lungs clear to auscultation - no rales, rhonchi or wheezes  CV: regular rate and rhythm, normal S1 S2, no S3 or S4, no murmur, click or rub, no peripheral edema and peripheral pulses strong  ABDOMEN: soft, nontender, no hepatosplenomegaly, no masses and bowel sounds normal  MS: no gross musculoskeletal defects noted, no edema    Results for orders placed or performed in visit on 03/19/21   Hemoglobin A1c     Status: Abnormal   Result Value Ref Range    Hemoglobin A1C 6.6 (H) 0 - 5.6 %

## 2021-03-22 RX ORDER — BLOOD SUGAR DIAGNOSTIC
STRIP MISCELLANEOUS
Qty: 100 STRIP | Refills: 4 | Status: SHIPPED | OUTPATIENT
Start: 2021-03-22 | End: 2023-05-25

## 2021-03-22 RX ORDER — BLOOD-GLUCOSE METER
1 EACH MISCELLANEOUS PRN
Qty: 1 KIT | Refills: 0 | Status: SHIPPED | OUTPATIENT
Start: 2021-03-22

## 2021-03-22 NOTE — RESULT ENCOUNTER NOTE
May,    All of the labs were normal or acceptable.    Please contact my office if you have questions.    Julia Reyna M.D.

## 2021-03-26 DIAGNOSIS — R80.9 TYPE 2 DIABETES MELLITUS WITH MICROALBUMINURIA, WITHOUT LONG-TERM CURRENT USE OF INSULIN (H): Primary | ICD-10-CM

## 2021-03-26 DIAGNOSIS — E11.29 TYPE 2 DIABETES MELLITUS WITH MICROALBUMINURIA, WITHOUT LONG-TERM CURRENT USE OF INSULIN (H): Primary | ICD-10-CM

## 2021-03-26 NOTE — TELEPHONE ENCOUNTER
Patient is requesting a prescription for accu check softclix lancets. Order pended. Routed to provider.  Luma Burrell RN

## 2021-05-12 ENCOUNTER — TELEPHONE (OUTPATIENT)
Dept: FAMILY MEDICINE | Facility: CLINIC | Age: 51
End: 2021-05-12

## 2021-05-12 NOTE — TELEPHONE ENCOUNTER
Patient Quality Outreach Summary      Summary:    Patient is due/failing the following:   Cervical Cancer Screening - PAP Needed    Type of outreach:    Sent [place] message.    Questions for provider review:    None                                                                                                                    Blanche Ho MA       Chart routed to Care Team.

## 2021-05-15 ENCOUNTER — HEALTH MAINTENANCE LETTER (OUTPATIENT)
Age: 51
End: 2021-05-15

## 2021-05-17 ENCOUNTER — TELEPHONE (OUTPATIENT)
Dept: FAMILY MEDICINE | Facility: CLINIC | Age: 51
End: 2021-05-17

## 2021-05-17 ENCOUNTER — MYC MEDICAL ADVICE (OUTPATIENT)
Dept: FAMILY MEDICINE | Facility: CLINIC | Age: 51
End: 2021-05-17

## 2021-05-17 DIAGNOSIS — Z12.11 SPECIAL SCREENING FOR MALIGNANT NEOPLASMS, COLON: Primary | ICD-10-CM

## 2021-05-17 NOTE — TELEPHONE ENCOUNTER
Reason for Call: Request for an order or referral:    Order or referral being requested: Pt calling to ask that an order be placed for a Colonoscopy - She states that she was told to have one every 2 years and last colonoscopy was June 2019.  Please call patient and advise.      Date needed: at your convenience    Has the patient been seen by the PCP for this problem? NO    Additional comments:     Phone number Patient can be reached at:  Cell number on file:    Telephone Information:   Mobile [phone]       Best Time:  any    Can we leave a detailed message on this number?  YES    Call taken on 5/17/2021 at 3:46 PM by Nayana Cisneros

## 2021-05-19 DIAGNOSIS — Z11.59 ENCOUNTER FOR SCREENING FOR OTHER VIRAL DISEASES: ICD-10-CM

## 2021-06-08 DIAGNOSIS — Z11.59 ENCOUNTER FOR SCREENING FOR OTHER VIRAL DISEASES: ICD-10-CM

## 2021-06-08 LAB
SARS-COV-2 RNA RESP QL NAA+PROBE: NORMAL
SPECIMEN SOURCE: NORMAL

## 2021-06-08 PROCEDURE — U0003 INFECTIOUS AGENT DETECTION BY NUCLEIC ACID (DNA OR RNA); SEVERE ACUTE RESPIRATORY SYNDROME CORONAVIRUS 2 (SARS-COV-2) (CORONAVIRUS DISEASE [COVID-19]), AMPLIFIED PROBE TECHNIQUE, MAKING USE OF HIGH THROUGHPUT TECHNOLOGIES AS DESCRIBED BY CMS-2020-01-R: HCPCS | Performed by: SURGERY

## 2021-06-08 PROCEDURE — U0005 INFEC AGEN DETEC AMPLI PROBE: HCPCS | Performed by: SURGERY

## 2021-06-09 ENCOUNTER — ANESTHESIA EVENT (OUTPATIENT)
Dept: GASTROENTEROLOGY | Facility: CLINIC | Age: 51
End: 2021-06-09
Payer: COMMERCIAL

## 2021-06-09 LAB
LABORATORY COMMENT REPORT: NORMAL
SARS-COV-2 RNA RESP QL NAA+PROBE: NEGATIVE
SPECIMEN SOURCE: NORMAL

## 2021-06-09 ASSESSMENT — LIFESTYLE VARIABLES: TOBACCO_USE: 1

## 2021-06-09 NOTE — ANESTHESIA PREPROCEDURE EVALUATION
Anesthesia Pre-Procedure Evaluation    Patient: May Wade   MRN: [id number] : 1970        Preoperative Diagnosis: Family history of colonic polyps [Z83.71]   Procedure : Procedure(s):  COLONOSCOPY     Past Medical History:   Diagnosis Date     cervical dysplasia     s/p cryo, normal since     Chronic hip pain 10/25/2006    neurontin     gestational diabetes     pregnancy # 2     Migraines     on amitryptiline     Pure hypercholesterolemia     on lipitor      Past Surgical History:   Procedure Laterality Date     COLONOSCOPY N/A 6/3/2019    Procedure: COLONOSCOPY, WITH POLYPECTOMY AND BIOPSY;  Surgeon: Live Cao MD;  Location: [facility]     CRYOTHERAPY, CERVICAL       HC HYSTEROSCOPY, SURGICAL; W/ ENDOMETRIAL ABLATION, ANY METHOD  08    Novasure     SURGICAL HISTORY OF -   2004    Left wrist dorsal ganglion cyst excision-Removal of the recurrent dorsal boss.     SURGICAL HISTORY OF -       Right Ankle Surgery (Ganglion Cyst)     SURGICAL HISTORY OF -       Breast Reduction     SURGICAL HISTORY OF -       Abdominal Wall Revision     SURGICAL HISTORY OF -   2003    Left Ankle Cyst Removal     SURGICAL HISTORY OF -       Left ankle surgery for ganglion cyst,      SURGICAL HISTORY OF -       Left wrist bone and cyst surgery      Allergies   Allergen Reactions     Pcn [Penicillins] Nausea and Vomiting     Aspartame Other (See Comments)     Severe pain, passing out     Benzoin Dermatitis     Tincture of benzoin - skin burn/rash       Social History     Tobacco Use     Smoking status: Passive Smoke Exposure - Never Smoker     Smokeless tobacco: Never Used   Substance Use Topics     Alcohol use: Yes     Comment: 1 drink every 2 weeks      Wt Readings from Last 1 Encounters:   21 63 kg (139 lb)        Anesthesia Evaluation   Pt has had prior anesthetic. Type: General and MAC.    History of anesthetic complications  - PONV.      ROS/MED HX  ENT/Pulmonary:     (+) tobacco use,      Neurologic:     (+) migraines,     Cardiovascular:     (+) Dyslipidemia hypertension-----    METS/Exercise Tolerance: >4 METS    Hematologic:  - neg hematologic  ROS     Musculoskeletal:   (+) arthritis,     GI/Hepatic:     (+) GERD, Asymptomatic on medication,     Renal/Genitourinary:  - neg Renal ROS     Endo:     (+) type II DM, Last HgA1c: 6.6, date: 3/19/21, Obesity,     Psychiatric/Substance Use:  - neg psychiatric ROS     Infectious Disease:  - neg infectious disease ROS     Malignancy:  - neg malignancy ROS     Other:  - neg other ROS          Physical Exam    Airway        Mallampati: II   TM distance: > 3 FB   Neck ROM: full   Mouth opening: > 3 cm    Respiratory Devices and Support         Dental  no notable dental history         Cardiovascular   cardiovascular exam normal          Pulmonary   pulmonary exam normal                OUTSIDE LABS:  CBC:   Lab Results   Component Value Date    WBC 6.3 08/26/2020    WBC 7.2 03/10/2017    HGB 12.8 08/26/2020    HGB 12.8 03/10/2017    HCT 39.7 08/26/2020    HCT 37.8 03/10/2017     08/26/2020     03/10/2017     BMP:   Lab Results   Component Value Date     03/19/2021     08/26/2020    POTASSIUM 4.0 03/19/2021    POTASSIUM 3.9 08/26/2020    CHLORIDE 104 03/19/2021    CHLORIDE 105 08/26/2020    CO2 29 03/19/2021    CO2 28 08/26/2020    BUN 13 03/19/2021    BUN 6 (L) 08/26/2020    CR 0.64 03/19/2021    CR 0.62 08/26/2020     (H) 03/19/2021     (H) 08/26/2020     COAGS: No results found for: PTT, INR, FIBR  POC:   Lab Results   Component Value Date     (H) 06/03/2019    HCG  07/02/2010     Negative   This test provides a presumptive diagnosis of pregnancy or non-pregnancy. A   confirmed pregnancy diagnosis should only be made by a physician after all   clinical and laboratory findings have been evaluated.     HEPATIC:   Lab Results   Component Value Date    ALBUMIN 4.0 02/14/2020    PROTTOTAL 7.9 02/14/2020    ALT 46  02/14/2020    AST 22 02/14/2020    ALKPHOS 73 02/14/2020    BILITOTAL 0.6 02/14/2020     OTHER:   Lab Results   Component Value Date    A1C 6.6 (H) 03/19/2021    MARINA 9.0 03/19/2021    LIPASE 139 07/22/2009    AMYLASE 64 07/22/2009    TSH 2.98 02/06/2018       Anesthesia Plan    ASA Status:  2      Anesthesia Type: General.   Induction: Intravenous, Propofol.   Maintenance: TIVA.        Consents    Anesthesia Plan(s) and associated risks, benefits, and realistic alternatives discussed. Questions answered and patient/representative(s) expressed understanding.     - Discussed with:  Patient         Postoperative Care            Comments:                Alberto Cason CRNA, APRN CRNA

## 2021-06-11 ENCOUNTER — ANESTHESIA (OUTPATIENT)
Dept: GASTROENTEROLOGY | Facility: CLINIC | Age: 51
End: 2021-06-11
Payer: COMMERCIAL

## 2021-06-11 ENCOUNTER — HOSPITAL ENCOUNTER (OUTPATIENT)
Facility: CLINIC | Age: 51
Discharge: HOME | End: 2021-06-11
Attending: SURGERY | Admitting: SURGERY
Payer: COMMERCIAL

## 2021-06-11 VITALS
RESPIRATION RATE: 16 BRPM | BODY MASS INDEX: 27.29 KG/M2 | TEMPERATURE: 98.1 F | OXYGEN SATURATION: 97 % | SYSTOLIC BLOOD PRESSURE: 110 MMHG | WEIGHT: 130 LBS | HEIGHT: 58 IN | DIASTOLIC BLOOD PRESSURE: 73 MMHG

## 2021-06-11 LAB
COLONOSCOPY: NORMAL
GLUCOSE BLDC GLUCOMTR-MCNC: 174 MG/DL (ref 70–99)

## 2021-06-11 PROCEDURE — 82962 GLUCOSE BLOOD TEST: CPT

## 2021-06-11 PROCEDURE — 250N000009 HC RX 250: Performed by: NURSE ANESTHETIST, CERTIFIED REGISTERED

## 2021-06-11 PROCEDURE — 88305 TISSUE EXAM BY PATHOLOGIST: CPT | Mod: 26 | Performed by: PATHOLOGY

## 2021-06-11 PROCEDURE — 250N000011 HC RX IP 250 OP 636: Performed by: NURSE ANESTHETIST, CERTIFIED REGISTERED

## 2021-06-11 PROCEDURE — 258N000003 HC RX IP 258 OP 636: Performed by: SURGERY

## 2021-06-11 PROCEDURE — 45385 COLONOSCOPY W/LESION REMOVAL: CPT | Mod: PT | Performed by: SURGERY

## 2021-06-11 PROCEDURE — 370N000017 HC ANESTHESIA TECHNICAL FEE, PER MIN: Performed by: SURGERY

## 2021-06-11 PROCEDURE — 45382 COLONOSCOPY W/CONTROL BLEED: CPT | Performed by: SURGERY

## 2021-06-11 PROCEDURE — 45380 COLONOSCOPY AND BIOPSY: CPT | Mod: 59 | Performed by: SURGERY

## 2021-06-11 PROCEDURE — 250N000009 HC RX 250: Performed by: SURGERY

## 2021-06-11 PROCEDURE — 88305 TISSUE EXAM BY PATHOLOGIST: CPT | Mod: TC | Performed by: SURGERY

## 2021-06-11 PROCEDURE — 45380 COLONOSCOPY AND BIOPSY: CPT | Performed by: SURGERY

## 2021-06-11 PROCEDURE — 250N000011 HC RX IP 250 OP 636: Performed by: SURGERY

## 2021-06-11 RX ORDER — NALOXONE HYDROCHLORIDE 0.4 MG/ML
0.2 INJECTION, SOLUTION INTRAMUSCULAR; INTRAVENOUS; SUBCUTANEOUS
Status: CANCELLED | OUTPATIENT
Start: 2021-06-11

## 2021-06-11 RX ORDER — FLUMAZENIL 0.1 MG/ML
0.2 INJECTION, SOLUTION INTRAVENOUS
Status: CANCELLED | OUTPATIENT
Start: 2021-06-11 | End: 2021-06-11

## 2021-06-11 RX ORDER — NALOXONE HYDROCHLORIDE 0.4 MG/ML
0.4 INJECTION, SOLUTION INTRAMUSCULAR; INTRAVENOUS; SUBCUTANEOUS
Status: CANCELLED | OUTPATIENT
Start: 2021-06-11

## 2021-06-11 RX ORDER — GLYCOPYRROLATE 0.2 MG/ML
INJECTION, SOLUTION INTRAMUSCULAR; INTRAVENOUS PRN
Status: DISCONTINUED | OUTPATIENT
Start: 2021-06-11 | End: 2021-06-11

## 2021-06-11 RX ORDER — LIDOCAINE 40 MG/G
CREAM TOPICAL
Status: DISCONTINUED | OUTPATIENT
Start: 2021-06-11 | End: 2021-06-11 | Stop reason: HOSPADM

## 2021-06-11 RX ORDER — PROPOFOL 10 MG/ML
INJECTION, EMULSION INTRAVENOUS PRN
Status: DISCONTINUED | OUTPATIENT
Start: 2021-06-11 | End: 2021-06-11

## 2021-06-11 RX ORDER — ONDANSETRON 2 MG/ML
4 INJECTION INTRAMUSCULAR; INTRAVENOUS
Status: COMPLETED | OUTPATIENT
Start: 2021-06-11 | End: 2021-06-11

## 2021-06-11 RX ORDER — LIDOCAINE HYDROCHLORIDE 10 MG/ML
INJECTION, SOLUTION EPIDURAL; INFILTRATION; INTRACAUDAL; PERINEURAL PRN
Status: DISCONTINUED | OUTPATIENT
Start: 2021-06-11 | End: 2021-06-11

## 2021-06-11 RX ORDER — PROPOFOL 10 MG/ML
INJECTION, EMULSION INTRAVENOUS CONTINUOUS PRN
Status: DISCONTINUED | OUTPATIENT
Start: 2021-06-11 | End: 2021-06-11

## 2021-06-11 RX ORDER — SCOLOPAMINE TRANSDERMAL SYSTEM 1 MG/1
1 PATCH, EXTENDED RELEASE TRANSDERMAL
Status: DISCONTINUED | OUTPATIENT
Start: 2021-06-11 | End: 2021-06-11 | Stop reason: HOSPADM

## 2021-06-11 RX ORDER — SODIUM CHLORIDE, SODIUM LACTATE, POTASSIUM CHLORIDE, CALCIUM CHLORIDE 600; 310; 30; 20 MG/100ML; MG/100ML; MG/100ML; MG/100ML
INJECTION, SOLUTION INTRAVENOUS CONTINUOUS
Status: DISCONTINUED | OUTPATIENT
Start: 2021-06-11 | End: 2021-06-11 | Stop reason: HOSPADM

## 2021-06-11 RX ADMIN — ONDANSETRON 4 MG: 2 INJECTION INTRAMUSCULAR; INTRAVENOUS at 07:29

## 2021-06-11 RX ADMIN — SODIUM CHLORIDE, POTASSIUM CHLORIDE, SODIUM LACTATE AND CALCIUM CHLORIDE: 600; 310; 30; 20 INJECTION, SOLUTION INTRAVENOUS at 07:29

## 2021-06-11 RX ADMIN — GLYCOPYRROLATE 0.2 MG: 0.2 INJECTION, SOLUTION INTRAMUSCULAR; INTRAVENOUS at 08:16

## 2021-06-11 RX ADMIN — PROPOFOL 200 MCG/KG/MIN: 10 INJECTION, EMULSION INTRAVENOUS at 08:16

## 2021-06-11 RX ADMIN — SCOPALAMINE 1 PATCH: 1 PATCH, EXTENDED RELEASE TRANSDERMAL at 07:12

## 2021-06-11 RX ADMIN — LIDOCAINE HYDROCHLORIDE 1 ML: 10 INJECTION, SOLUTION EPIDURAL; INFILTRATION; INTRACAUDAL; PERINEURAL at 07:29

## 2021-06-11 RX ADMIN — LIDOCAINE HYDROCHLORIDE 50 MG: 10 INJECTION, SOLUTION EPIDURAL; INFILTRATION; INTRACAUDAL; PERINEURAL at 08:16

## 2021-06-11 RX ADMIN — PROPOFOL 100 MG: 10 INJECTION, EMULSION INTRAVENOUS at 08:16

## 2021-06-11 ASSESSMENT — MIFFLIN-ST. JEOR: SCORE: 1089.43

## 2021-06-11 NOTE — ANESTHESIA CARE TRANSFER NOTE
Patient: May Wade    Procedure(s):  COLONOSCOPY, WITH POLYPECTOMY AND BIOPSY  Colonoscopy, With Hemorrhage Control    Diagnosis: Family history of colonic polyps [Z83.71]  Diagnosis Additional Information: No value filed.    Anesthesia Type:   General     Note:    Oropharynx: oropharynx clear of all foreign objects  Level of Consciousness: awake  Oxygen Supplementation: room air    Independent Airway: airway patency satisfactory and stable  Dentition: dentition unchanged      Patient transferred to: Phase II    Handoff Report: Identifed the Patient, Identified the Reponsible Provider, Reviewed the pertinent medical history, Discussed the surgical course, Reviewed Intra-OP anesthesia mangement and issues during anesthesia, Set expectations for post-procedure period and Allowed opportunity for questions and acknowledgement of understanding      Vitals: (Last set prior to Anesthesia Care Transfer)  CRNA VITALS  6/11/2021 0807 - 6/11/2021 0838      6/11/2021             Pulse:  90    Ht Rate:  91    SpO2:  100 %    Resp Rate (observed):  21        Electronically Signed By: ASTRID Aviles CRNA  June 11, 2021  8:38 AM

## 2021-06-11 NOTE — H&P
50 year old year old female here for colonoscopy for personal history of polyps.  Last colonoscopy was 2 years ago, multiple polyps were found.  Patient denies any changes in stool caliber or blood in stool. Patient denies family history of colon cancer.    Patient Active Problem List   Diagnosis     Hypertriglyceridemia     Hyperlipidemia LDL goal <100     HTN, goal below 140/90     Esophageal reflux     overweight BMI >30     Type 2 diabetes mellitus with microalbuminuria, without long-term current use of insulin (H)       Past Medical History:   Diagnosis Date     cervical dysplasia     s/p cryo, normal since     Chronic hip pain 10/25/2006    neurontin     gestational diabetes     pregnancy # 2     Migraines     on amitryptiline     Pure hypercholesterolemia     on lipitor       Past Surgical History:   Procedure Laterality Date     COLONOSCOPY N/A 6/3/2019    Procedure: COLONOSCOPY, WITH POLYPECTOMY AND BIOPSY;  Surgeon: Live Cao MD;  Location: [facility]     CRYOTHERAPY, CERVICAL       HC HYSTEROSCOPY, SURGICAL; W/ ENDOMETRIAL ABLATION, ANY METHOD  08    Novasure     SURGICAL HISTORY OF -   2004    Left wrist dorsal ganglion cyst excision-Removal of the recurrent dorsal boss.     SURGICAL HISTORY OF -       Right Ankle Surgery (Ganglion Cyst)     SURGICAL HISTORY OF -       Breast Reduction     SURGICAL HISTORY OF -       Abdominal Wall Revision     SURGICAL HISTORY OF -   2003    Left Ankle Cyst Removal     SURGICAL HISTORY OF -       Left ankle surgery for ganglion cyst,      SURGICAL HISTORY OF -       Left wrist bone and cyst surgery       Family History   Problem Relation Age of Onset     C.A.D. Father          age 51     Respiratory Father         smoker     Cerebrovascular Disease Maternal Grandmother      Hypertension Maternal Grandmother      C.A.D. Paternal Grandfather      Asthma No family hx of      Diabetes No family hx of      Breast Cancer No family hx of       "Cancer - colorectal No family hx of      Prostate Cancer No family hx of        No current outpatient medications on file.       Allergies   Allergen Reactions     Pcn [Penicillins] Nausea and Vomiting     Aspartame Other (See Comments)     Severe pain, passing out     Benzoin Dermatitis     Tincture of benzoin - skin burn/rash        Pt reports that she is a non-smoker but has been exposed to tobacco smoke. She has never used smokeless tobacco. She reports current alcohol use. She reports that she does not use drugs.    Exam:  /89   Temp 98.1  F (36.7  C) (Oral)   Resp 18   Ht 1.473 m (4' 10\")   Wt 59 kg (130 lb)   SpO2 96%   BMI 27.17 kg/m      Awake, Alert OX3  Lungs - CTA bilaterally  CV - RRR, no murmurs, distal pulses intact  Abd - soft, non-distended, non-tender, +BS  Extr - No cyanosis or edema    A/P 50 year old year old female in need of colonoscopy for personal history of polyps. Risks, benefits, alternatives, and complications were discussed including the possibility of perforation, bleeding, missed lesion and the patient agreed to proceed.    Billy Rivera, DO on 6/11/2021 at 8:04 AM      "

## 2021-06-11 NOTE — ANESTHESIA POSTPROCEDURE EVALUATION
Patient: May Wade    Procedure(s):  COLONOSCOPY, WITH POLYPECTOMY AND BIOPSY  Colonoscopy, With Hemorrhage Control    Diagnosis:Family history of colonic polyps [Z83.71]  Diagnosis Additional Information: No value filed.    Anesthesia Type:  General    Note:  Disposition: Outpatient   Postop Pain Control: Uneventful            Sign Out: Well controlled pain   PONV: No   Neuro/Psych: Uneventful            Sign Out: Acceptable/Baseline neuro status   Airway/Respiratory: Uneventful            Sign Out: Acceptable/Baseline resp. status   CV/Hemodynamics: Uneventful            Sign Out: Acceptable CV status; No obvious hypovolemia; No obvious fluid overload   Other NRE: NONE   DID A NON-ROUTINE EVENT OCCUR?            Last vitals:  Vitals:    06/11/21 0702   BP: 126/89   Resp: 18   Temp: 36.7  C (98.1  F)   SpO2: 96%       Last vitals prior to Anesthesia Care Transfer:  CRNA VITALS  6/11/2021 0807 - 6/11/2021 0838      6/11/2021             Pulse:  90    Ht Rate:  91    SpO2:  100 %    Resp Rate (observed):  21          Electronically Signed By: ASTRID Aviles CRNA  June 11, 2021  8:38 AM

## 2021-06-14 LAB — COPATH REPORT: NORMAL

## 2021-09-04 ENCOUNTER — HEALTH MAINTENANCE LETTER (OUTPATIENT)
Age: 51
End: 2021-09-04

## 2021-10-30 ENCOUNTER — HEALTH MAINTENANCE LETTER (OUTPATIENT)
Age: 51
End: 2021-10-30

## 2022-03-15 ENCOUNTER — OFFICE VISIT (OUTPATIENT)
Dept: FAMILY MEDICINE | Facility: CLINIC | Age: 52
End: 2022-03-15
Payer: COMMERCIAL

## 2022-03-15 VITALS
RESPIRATION RATE: 18 BRPM | HEART RATE: 100 BPM | OXYGEN SATURATION: 98 % | SYSTOLIC BLOOD PRESSURE: 118 MMHG | TEMPERATURE: 97.5 F | HEIGHT: 58 IN | WEIGHT: 140 LBS | BODY MASS INDEX: 29.39 KG/M2 | DIASTOLIC BLOOD PRESSURE: 82 MMHG

## 2022-03-15 DIAGNOSIS — Z01.812 ENCOUNTER FOR PREOPERATIVE SCREENING LABORATORY TESTING FOR COVID-19 VIRUS: ICD-10-CM

## 2022-03-15 DIAGNOSIS — Z11.52 ENCOUNTER FOR PREOPERATIVE SCREENING LABORATORY TESTING FOR COVID-19 VIRUS: ICD-10-CM

## 2022-03-15 DIAGNOSIS — M19.012 ARTHROPATHY OF LEFT SHOULDER: ICD-10-CM

## 2022-03-15 DIAGNOSIS — Z01.818 PREOP GENERAL PHYSICAL EXAM: Primary | ICD-10-CM

## 2022-03-15 PROCEDURE — U0003 INFECTIOUS AGENT DETECTION BY NUCLEIC ACID (DNA OR RNA); SEVERE ACUTE RESPIRATORY SYNDROME CORONAVIRUS 2 (SARS-COV-2) (CORONAVIRUS DISEASE [COVID-19]), AMPLIFIED PROBE TECHNIQUE, MAKING USE OF HIGH THROUGHPUT TECHNOLOGIES AS DESCRIBED BY CMS-2020-01-R: HCPCS | Performed by: FAMILY MEDICINE

## 2022-03-15 PROCEDURE — U0005 INFEC AGEN DETEC AMPLI PROBE: HCPCS | Performed by: FAMILY MEDICINE

## 2022-03-15 PROCEDURE — 99214 OFFICE O/P EST MOD 30 MIN: CPT | Performed by: FAMILY MEDICINE

## 2022-03-15 ASSESSMENT — PAIN SCALES - GENERAL: PAINLEVEL: SEVERE PAIN (6)

## 2022-03-15 NOTE — PROGRESS NOTES
[facility]  [address]  [facility] [address]  Phone: [phone]  Primary Provider: Julia Reyna  Pre-op Performing Provider: CRISTINA CLEMENT      PREOPERATIVE EVALUATION:  Today's date: 3/15/2022    May Wade is a 51 year old female who presents for a preoperative evaluation.    Surgical Information:  Surgery/Procedure: Left shoulder surgery bone spurs   Surgery Location: [facility]   Surgeon: Dr. Audie Jeong   Surgery Date: 3-17-22  Time of Surgery:  to be determined  Where patient plans to recover: At home with family  Fax number for surgical facility: [phone]    Type of Anesthesia Anticipated: to be determined    Assessment & Plan     The proposed surgical procedure is considered INTERMEDIATE risk.    Preop general physical exam  Arthropathy of left shoulder  Encounter for preoperative screening laboratory testing for COVID-19 virus  - Asymptomatic COVID-19 Virus (Coronavirus) by PCR    Risks and Recommendations:  The patient has the following additional risks and recommendations for perioperative complications:   - No identified additional risk factors other than previously addressed    Medication Instructions:  - Take all of your medications the evening before surgery and skip all meds the morning of surgery  - STOP taking all vitamins and herbal supplements as well as NSAIDs (alleve, advil, aspirin) 5-7 days before surgery.    RECOMMENDATION:  APPROVAL GIVEN to proceed with proposed procedure, without further diagnostic evaluation.    Cristina Clement MD  Family Medicine  [facility]            Subjective      HPI related to upcoming procedure: chronic L shoulder pain with bone spurs, arthroscopy    Preop Questions 3/15/2022   1. Have you ever had a heart attack or stroke? No   2. Have you ever had surgery on your heart or blood vessels, such as a stent placement, a coronary artery bypass, or surgery on an  artery in your head, neck, heart, or legs? No   3. Do you have chest pain with activity? No   4. Do you have a history of  heart failure? No   5. Do you currently have a cold, bronchitis or symptoms of other infection? No   6. Do you have a cough, shortness of breath, or wheezing? No   7. Do you or anyone in your family have previous history of blood clots? No   8. Do you or does anyone in your family have a serious bleeding problem such as prolonged bleeding following surgeries or cuts? No   9. Have you ever had problems with anemia or been told to take iron pills? No   10. Have you had any abnormal blood loss such as black, tarry or bloody stools, or abnormal vaginal bleeding? No   11. Have you ever had a blood transfusion? No   12. Are you willing to have a blood transfusion if it is medically needed before, during, or after your surgery? Yes   13. Have you or any of your relatives ever had problems with anesthesia? No   14. Do you have sleep apnea, excessive snoring or daytime drowsiness? No   15. Do you have any artifical heart valves or other implanted medical devices like a pacemaker, defibrillator, or continuous glucose monitor? No   16. Do you have artificial joints? No   17. Are you allergic to latex? No   18. Is there any chance that you may be pregnant? No     Health Care Directive:  Patient does not have a Health Care Directive or Living Will: Patient states has Advance Directive and will bring in a copy to clinic. - she already has this done    Preoperative Review of :   reviewed - no record of controlled substances prescribed.    Review of Systems  Constitutional, neuro, ENT, endocrine, pulmonary, cardiac, gastrointestinal, genitourinary, musculoskeletal, integument and psychiatric systems are negative, except as otherwise noted.    Patient Active Problem List    Diagnosis Date Noted     Type 2 diabetes mellitus with microalbuminuria, without long-term current use of insulin (H) 02/07/2018      Priority: Medium     overweight BMI >30 10/18/2015     Priority: Medium     Esophageal reflux 02/17/2014     Priority: Medium     HTN, goal below 140/90 12/09/2013     Priority: Medium     Hypertriglyceridemia 10/26/2010     Priority: Medium     Hyperlipidemia LDL goal <100 10/26/2010     Priority: Medium      Past Medical History:   Diagnosis Date     cervical dysplasia 1990    s/p cryo, normal since     Chronic hip pain 10/25/2006    neurontin     gestational diabetes     pregnancy # 2     Migraines     on amitryptiline     Pure hypercholesterolemia     on lipitor     Past Surgical History:   Procedure Laterality Date     COLONOSCOPY N/A 6/3/2019    Procedure: COLONOSCOPY, WITH POLYPECTOMY AND BIOPSY;  Surgeon: Live Cao MD;  Location: [facility]     COLONOSCOPY N/A 6/11/2021    Procedure: COLONOSCOPY, WITH POLYPECTOMY AND BIOPSY;  Surgeon: Billy Rivera DO;  Location: [facility]     CRYOTHERAPY, CERVICAL  1990     HC HYSTEROSCOPY, SURGICAL; W/ ENDOMETRIAL ABLATION, ANY METHOD  2/7/08    Novasure     SURGICAL HISTORY OF -   6/17/2004    Left wrist dorsal ganglion cyst excision-Removal of the recurrent dorsal boss.     SURGICAL HISTORY OF -   1994    Right Ankle Surgery (Ganglion Cyst)     SURGICAL HISTORY OF -       Breast Reduction     SURGICAL HISTORY OF -       Abdominal Wall Revision     SURGICAL HISTORY OF -   5/22/2003    Left Ankle Cyst Removal     SURGICAL HISTORY OF -   2001    Left ankle surgery for ganglion cyst,      SURGICAL HISTORY OF -       Left wrist bone and cyst surgery     Current Outpatient Medications   Medication Sig Dispense Refill     ACCU-CHEK GUIDE test strip Use to test blood sugar 2 times daily or as directed. 100 strip 4     blood glucose (NO BRAND SPECIFIED) lancets standard Use to test blood sugar 2 times daily or as directed. 100 lancet 1     Blood Glucose Monitoring Suppl (ACCU-CHEK GUIDE ME) w/Device KIT 1 each as needed 1 kit 0     empagliflozin (JARDIANCE) 25 MG TABS  "tablet Take 1 tablet (25 mg) by mouth daily VISIT DUE 90 tablet 0     fish oil-omega-3 fatty acids (OMEGA 3) 1000 MG capsule Take 2 capsules by mouth 2 times daily. 360 capsule 3     losartan (COZAAR) 25 MG tablet Take 1 tablet (25 mg) by mouth daily 90 tablet 3     metFORMIN (GLUCOPHAGE-XR) 500 MG 24 hr tablet Take 2 tablets (1,000 mg) by mouth 2 times daily (with meals) VISIT  tablet 0     omeprazole (PRILOSEC) 20 MG DR capsule TAKE ONE CAPSULE BY MOUTH ONCE DAILY (TAKE 30 TO 60 MINUTES BEFORE A MEAL) 90 capsule 3     OZEMPIC, 0.25 OR 0.5 MG/DOSE, 2 MG/1.5ML SOPN pen INJECT 0.5 MG SUBCUTANEOUS ONCE A WEEK. DUE FOR VISIT/LAB 2 mL 0     simvastatin (ZOCOR) 40 MG tablet TAKE ONE TABLET BY MOUTH EVERY NIGHT AT BEDTIME 90 tablet 3       Allergies   Allergen Reactions     Pcn [Penicillins] Nausea and Vomiting     Aspartame Other (See Comments)     Severe pain, passing out     Benzoin Dermatitis     Tincture of benzoin - skin burn/rash         Social History     Tobacco Use     Smoking status: Never Smoker     Smokeless tobacco: Never Used   Substance Use Topics     Alcohol use: Yes     Comment: 1 drink every 2 weeks     History   Drug Use No         Objective     /82   Pulse 100   Temp 97.5  F (36.4  C) (Tympanic)   Resp 18   Ht 1.473 m (4' 10\")   Wt 63.5 kg (140 lb)   SpO2 98%   BMI 29.26 kg/m      Physical Exam    GENERAL APPEARANCE: healthy, alert and no distress     EYES: EOMI, PERRL     HENT: ear canals and TM's normal and nose and mouth without ulcers or lesions     NECK: no adenopathy, no asymmetry, masses, or scars and thyroid normal to palpation     RESP: lungs clear to auscultation - no rales, rhonchi or wheezes     CV: regular rates and rhythm, normal S1 S2, no S3 or S4 and no murmur, click or rub     ABDOMEN:  soft, nontender, no HSM or masses and bowel sounds normal     MS: extremities normal- no gross deformities noted, no evidence of inflammation in joints, FROM in all extremities.     " SKIN: no suspicious lesions or rashes     NEURO: Normal strength and tone, sensory exam grossly normal, mentation intact and speech normal     PSYCH: mentation appears normal. and affect normal/bright     LYMPHATICS: No cervical adenopathy    Recent Labs   Lab Test 03/19/21  1042 08/26/20  1137   HGB  --  12.8   PLT  --  205    138   POTASSIUM 4.0 3.9   CR 0.64 0.62   A1C 6.6* 6.6*        Diagnostics:  No labs were ordered during this visit.   No EKG required, no history of coronary heart disease, significant arrhythmia, peripheral arterial disease or other structural heart disease.    Revised Cardiac Risk Index (RCRI):  The patient has the following serious cardiovascular risks for perioperative complications:   - Diabetes Mellitus (on Insulin) = 1 point     RCRI Interpretation: 1 point: Class II (low risk - 0.9% complication rate)           Signed Electronically by: Yojana Clement MD  Copy of this evaluation report is provided to requesting physician.

## 2022-03-15 NOTE — PATIENT INSTRUCTIONS
Preparing for Your Surgery  Getting started  A nurse will call you to review your health history and instructions. They will give you an arrival time based on your scheduled surgery time. Please be ready to share:    Your doctor's clinic name and phone number    Your medical, surgical and anesthesia history    A list of allergies and sensitivities    A list of medicines, including herbal treatments and over-the-counter drugs    Whether the patient has a legal guardian (ask how to send us the papers in advance)  Please tell us if you're pregnant--or if there's any chance you might be pregnant. Some surgeries may injure a fetus (unborn baby), so they require a pregnancy test. Surgeries that are safe for a fetus don't always need a test, and you can choose whether to have one.   If you have a child who's having surgery, please ask for a copy of Preparing for Your Child's Surgery.    Preparing for surgery    Within 30 days of surgery: Have a pre-op exam (sometimes called an H&P, or History and Physical). This can be done at a clinic or pre-operative center.  ? If you're having a , you may not need this exam. Talk to your care team.    At your pre-op exam, talk to your care team about all medicines you take. If you need to stop any medicines before surgery, ask when to start taking them again.  ? We do this for your safety. Many medicines can make you bleed too much during surgery. Some change how well surgery (anesthesia) drugs work.    Call your insurance company to let them know you're having surgery. (If you don't have insurance, call [phone].)    Call your clinic if there's any change in your health. This includes signs of a cold or flu (sore throat, runny nose, cough, rash, fever). It also includes a scrape or scratch near the surgery site.    If you have questions on the day of surgery, call your hospital or surgery center.  COVID testing  You may need to be tested for COVID-19 before having  surgery. If so, your surgical team will give you instructions for scheduling this test, separate from your preoperative history and physical.  Eating and drinking guidelines  For your safety: Unless your surgeon tells you otherwise, follow the guidelines below.    Eat and drink as usual until 8 hours before surgery. After that, no food or milk.    Drink clear liquids until 2 hours before surgery. These are liquids you can see through, like water, Gatorade and Propel Water. You may also have black coffee and tea (no cream or milk).    Nothing by mouth within 2 hours of surgery. This includes gum, candy and breath mints.    If you drink alcohol: Stop drinking it the night before surgery.    If your care team tells you to take medicine on the morning of surgery, it's okay to take it with a sip of water.  Preventing infection    Shower or bathe the night before and morning of your surgery. Follow the instructions your clinic gave you. (If no instructions, use regular soap.)    Don't shave or clip hair near your surgery site. We'll remove the hair if needed.    Don't smoke or vape the morning of surgery. You may chew nicotine gum up to 2 hours before surgery. A nicotine patch is okay.  ? Note: Some surgeries require you to completely quit smoking and nicotine. Check with your surgeon.    Your care team will make every effort to keep you safe from infection. We will:  ? Clean our hands often with soap and water (or an alcohol-based hand rub).  ? Clean the skin at your surgery site with a special soap that kills germs.  ? Give you a special gown to keep you warm. (Cold raises the risk of infection.)  ? Wear special hair covers, masks, gowns and gloves during surgery.  ? Give antibiotic medicine, if prescribed. Not all surgeries need antibiotics.  What to bring on the day of surgery    Photo ID and insurance card    Copy of your health care directive, if you have one    Glasses and hearing aides (bring cases)  ? You can't  wear contacts during surgery    Inhaler and eye drops, if you use them (tell us about these when you arrive)    CPAP machine or breathing device, if you use them    A few personal items, if spending the night    If you have . . .  ? A pacemaker, ICD (cardiac defibrillator) or other implant: Bring the ID card.  ? An implanted stimulator: Bring the remote control.  ? A legal guardian: Bring a copy of the certified (court-stamped) guardianship papers.  Please remove any jewelry, including body piercings. Leave jewelry and other valuables at home.  If you're going home the day of surgery    You must have a responsible adult drive you home. They should stay with you overnight as well.    If you don't have someone to stay with you, and you aren't safe to go home alone, we may keep you overnight. Insurance often won't pay for this.  After surgery  If it's hard to control your pain or you need more pain medicine, please call your surgeon's office.  Questions?   If you have any questions for your care team, list them here: _________________________________________________________________________________________________________________________________________________________________________ ____________________________________ ____________________________________ ____________________________________  For informational purposes only. Not to replace the advice of your health care provider. Copyright   2003, 2019 [facility]. All rights reserved. Clinically reviewed by Crys Brown MD. [place] [id number] - REV 07/21.    How to Take Your Medication Before Surgery  - Take all of your medications the evening before surgery and skip all meds the morning of surgery  - STOP taking all vitamins and herbal supplements as well as NSAIDs (alleve, advil, aspirin) 5-7 days before surgery.

## 2022-03-15 NOTE — LETTER
2022    Regarding:  May Wade   1970    To: [facility]  Dr. Audie Jeong  Fax: [phone]     We are writing to inform you of May Wade's test results.  Resulted Orders   Asymptomatic COVID-19 Virus (Coronavirus) by PCR Nose   Result Value Ref Range    SARS CoV2 PCR Negative Negative, Testing sent to reference lab. Results will be returned via unsolicited result      Comment:      NEGATIVE: SARS-CoV-2 (COVID-19) RNA not detected, presumed negative.    Narrative    Testing was performed using the Aptima SARS-CoV-2 Assay on the  [employer] Instrument System. Additional information about this  Emergency Use Authorization (EUA) assay can be found via the Lab  Guide. This test should be ordered for the detection of SARS-CoV-2 in  individuals who meet SARS-CoV-2 clinical and/or epidemiological  criteria. Test performance is unknown in asymptomatic patients. This  test is for in vitro diagnostic use under the FDA EUA for  laboratories certified under CLIA to perform high complexity testing.  This test has not been FDA cleared or approved. A negative result  does not rule out the presence of PCR inhibitors in the specimen or  target RNA in concentration below the limit of detection for the  assay. The possibility of a false negative should be considered if  the patient's recent exposure or clinical presentation suggests  COVID-19. This test was validated by the [facility]. This laboratory is certified under  the Clinical Laboratory Improvement Amendments of 1988 (CLIA-88) as  qualified to perform high complexity laboratory testing.     If you have any questions or concerns, please call the clinic at the number listed above.     Sincerely,    Yojana Clement MD

## 2022-03-16 LAB — SARS-COV-2 RNA RESP QL NAA+PROBE: NEGATIVE

## 2022-03-24 DIAGNOSIS — E11.29 TYPE 2 DIABETES MELLITUS WITH MICROALBUMINURIA, WITHOUT LONG-TERM CURRENT USE OF INSULIN (H): ICD-10-CM

## 2022-03-24 DIAGNOSIS — R80.9 TYPE 2 DIABETES MELLITUS WITH MICROALBUMINURIA, WITHOUT LONG-TERM CURRENT USE OF INSULIN (H): ICD-10-CM

## 2022-03-25 RX ORDER — METFORMIN HCL 500 MG
TABLET, EXTENDED RELEASE 24 HR ORAL
Qty: 120 TABLET | Refills: 0 | Status: SHIPPED | OUTPATIENT
Start: 2022-03-25 | End: 2022-04-21

## 2022-03-29 ENCOUNTER — OFFICE VISIT (OUTPATIENT)
Dept: FAMILY MEDICINE | Facility: CLINIC | Age: 52
End: 2022-03-29
Payer: COMMERCIAL

## 2022-03-29 VITALS
WEIGHT: 141 LBS | DIASTOLIC BLOOD PRESSURE: 82 MMHG | HEART RATE: 99 BPM | SYSTOLIC BLOOD PRESSURE: 120 MMHG | TEMPERATURE: 98.1 F | BODY MASS INDEX: 29.6 KG/M2 | HEIGHT: 58 IN | OXYGEN SATURATION: 98 %

## 2022-03-29 DIAGNOSIS — M19.012 ARTHRITIS OF LEFT SHOULDER REGION: ICD-10-CM

## 2022-03-29 DIAGNOSIS — Z48.02 VISIT FOR SUTURE REMOVAL: Primary | ICD-10-CM

## 2022-03-29 PROCEDURE — 99213 OFFICE O/P EST LOW 20 MIN: CPT | Performed by: NURSE PRACTITIONER

## 2022-03-29 NOTE — PROGRESS NOTES
"  Assessment & Plan       ICD-10-CM    1. Visit for suture removal  Z48.02 REVIEW OF HEALTH MAINTENANCE PROTOCOL ORDERS   2. Arthritis of left shoulder region  M19.012      For sutures removed from his left shoulder.  Procedure was completed through [facility] for bone spur shaving.  This was completed laparoscopically.  Wound sites have healed appropriately.  Discussed aftercare with patient.    She has an appointment scheduled in 2 weeks to follow-up with her primary care for preventative care and diabetes.    BMI:   Estimated body mass index is 29.47 kg/m  as calculated from the following:    Height as of this encounter: 1.473 m (4' 10\").    Weight as of this encounter: 64 kg (141 lb).           Return in about 2 weeks (around 4/12/2022) for Routine preventive, or sooner if symptoms change.    ASTRID Barros CNP  [facility]    Rey Olvera is a 51 year old who presents for the following health issues;     History of Present Illness       Reason for visit:  Stitches out in 3 areas on left shoulder 4 sutures all together  Symptom onset:  1-2 weeks ago  Symptoms include:  Had bones spurs removed   Symptom intensity:  Mild  Symptom progression:  Improving  Had these symptoms before:  No  What makes it worse:  None  What makes it better:  None    She eats 2-3 servings of fruits and vegetables daily.She consumes 0 sweetened beverage(s) daily.She exercises with enough effort to increase her heart rate 10 to 19 minutes per day.  She exercises with enough effort to increase her heart rate 4 days per week.   She is taking medications regularly.     Suture removal from left shoulder post procedure. No localized pain or tenderness. No oozing from sutures. Feeling well. She has a preventative care visit scheduled with her PCP in 2 weeks.     Review of Systems   Constitutional, HEENT, cardiovascular, pulmonary, gi and gu systems are negative, except as otherwise noted.      Objective    BP " "120/82   Pulse 99   Temp 98.1  F (36.7  C) (Tympanic)   Ht 1.473 m (4' 10\")   Wt 64 kg (141 lb)   HC 17 cm (6.69\")   SpO2 98%   BMI 29.47 kg/m    Body mass index is 29.47 kg/m .  Physical Exam   GENERAL: healthy, alert and no distress  SKIN: Suture removal of the left shoulder. Anterior lap site well approximated 1 mattress suture removed.  AC joint with 1 mattress suture and 1 interrupted suture removed.  Posterior shoulder laparoscopic site 1 mattress suture removed.    "

## 2022-04-04 DIAGNOSIS — R80.9 TYPE 2 DIABETES MELLITUS WITH MICROALBUMINURIA, WITHOUT LONG-TERM CURRENT USE OF INSULIN (H): ICD-10-CM

## 2022-04-04 DIAGNOSIS — E11.29 TYPE 2 DIABETES MELLITUS WITH MICROALBUMINURIA, WITHOUT LONG-TERM CURRENT USE OF INSULIN (H): ICD-10-CM

## 2022-04-04 RX ORDER — SEMAGLUTIDE 1.34 MG/ML
0.5 INJECTION, SOLUTION SUBCUTANEOUS WEEKLY
Qty: 1.5 ML | Refills: 0 | Status: SHIPPED | OUTPATIENT
Start: 2022-04-04 | End: 2022-04-21 | Stop reason: DRUGHIGH

## 2022-04-21 ENCOUNTER — OFFICE VISIT (OUTPATIENT)
Dept: FAMILY MEDICINE | Facility: CLINIC | Age: 52
End: 2022-04-21
Payer: COMMERCIAL

## 2022-04-21 VITALS
HEART RATE: 103 BPM | TEMPERATURE: 98.4 F | RESPIRATION RATE: 17 BRPM | OXYGEN SATURATION: 97 % | SYSTOLIC BLOOD PRESSURE: 110 MMHG | WEIGHT: 144.8 LBS | BODY MASS INDEX: 30.39 KG/M2 | DIASTOLIC BLOOD PRESSURE: 70 MMHG | HEIGHT: 58 IN

## 2022-04-21 DIAGNOSIS — R80.9 TYPE 2 DIABETES MELLITUS WITH MICROALBUMINURIA, WITHOUT LONG-TERM CURRENT USE OF INSULIN (H): ICD-10-CM

## 2022-04-21 DIAGNOSIS — R05.9 COUGH: ICD-10-CM

## 2022-04-21 DIAGNOSIS — E11.29 TYPE 2 DIABETES MELLITUS WITH MICROALBUMINURIA, WITHOUT LONG-TERM CURRENT USE OF INSULIN (H): ICD-10-CM

## 2022-04-21 DIAGNOSIS — I10 HTN, GOAL BELOW 140/90: ICD-10-CM

## 2022-04-21 DIAGNOSIS — Z12.4 SCREENING FOR CERVICAL CANCER: ICD-10-CM

## 2022-04-21 DIAGNOSIS — Z00.00 ROUTINE GENERAL MEDICAL EXAMINATION AT A HEALTH CARE FACILITY: Primary | ICD-10-CM

## 2022-04-21 DIAGNOSIS — K21.9 GASTROESOPHAGEAL REFLUX DISEASE WITHOUT ESOPHAGITIS: ICD-10-CM

## 2022-04-21 DIAGNOSIS — E78.5 HYPERLIPIDEMIA LDL GOAL <100: ICD-10-CM

## 2022-04-21 LAB
ALBUMIN SERPL-MCNC: 4.6 G/DL (ref 3.4–5)
ALP SERPL-CCNC: 106 U/L (ref 40–150)
ALT SERPL W P-5'-P-CCNC: 45 U/L (ref 0–50)
ANION GAP SERPL CALCULATED.3IONS-SCNC: 4 MMOL/L (ref 3–14)
AST SERPL W P-5'-P-CCNC: 22 U/L (ref 0–45)
BILIRUB SERPL-MCNC: 0.4 MG/DL (ref 0.2–1.3)
BUN SERPL-MCNC: 17 MG/DL (ref 7–30)
CALCIUM SERPL-MCNC: 9.4 MG/DL (ref 8.5–10.1)
CHLORIDE BLD-SCNC: 105 MMOL/L (ref 94–109)
CHOLEST SERPL-MCNC: 154 MG/DL
CO2 SERPL-SCNC: 29 MMOL/L (ref 20–32)
CREAT SERPL-MCNC: 0.71 MG/DL (ref 0.52–1.04)
CREAT UR-MCNC: 46 MG/DL
FASTING STATUS PATIENT QL REPORTED: NO
GFR SERPL CREATININE-BSD FRML MDRD: >90 ML/MIN/1.73M2
GLUCOSE BLD-MCNC: 273 MG/DL (ref 70–99)
HBA1C MFR BLD: 8 % (ref 0–5.6)
HDLC SERPL-MCNC: 43 MG/DL
LDLC SERPL CALC-MCNC: 80 MG/DL
LDLC SERPL CALC-MCNC: ABNORMAL MG/DL
MICROALBUMIN UR-MCNC: <5 MG/L
MICROALBUMIN/CREAT UR: NORMAL MG/G{CREAT}
NONHDLC SERPL-MCNC: 111 MG/DL
POTASSIUM BLD-SCNC: 4.2 MMOL/L (ref 3.4–5.3)
PROT SERPL-MCNC: 8 G/DL (ref 6.8–8.8)
SODIUM SERPL-SCNC: 138 MMOL/L (ref 133–144)
TRIGL SERPL-MCNC: 442 MG/DL

## 2022-04-21 PROCEDURE — 36415 COLL VENOUS BLD VENIPUNCTURE: CPT | Performed by: FAMILY MEDICINE

## 2022-04-21 PROCEDURE — 83036 HEMOGLOBIN GLYCOSYLATED A1C: CPT | Performed by: FAMILY MEDICINE

## 2022-04-21 PROCEDURE — 83721 ASSAY OF BLOOD LIPOPROTEIN: CPT | Mod: 59 | Performed by: FAMILY MEDICINE

## 2022-04-21 PROCEDURE — 80053 COMPREHEN METABOLIC PANEL: CPT | Performed by: FAMILY MEDICINE

## 2022-04-21 PROCEDURE — 99396 PREV VISIT EST AGE 40-64: CPT | Performed by: FAMILY MEDICINE

## 2022-04-21 PROCEDURE — 87624 HPV HI-RISK TYP POOLED RSLT: CPT | Performed by: FAMILY MEDICINE

## 2022-04-21 PROCEDURE — 99214 OFFICE O/P EST MOD 30 MIN: CPT | Mod: 25 | Performed by: FAMILY MEDICINE

## 2022-04-21 PROCEDURE — 80061 LIPID PANEL: CPT | Performed by: FAMILY MEDICINE

## 2022-04-21 PROCEDURE — 82043 UR ALBUMIN QUANTITATIVE: CPT | Performed by: FAMILY MEDICINE

## 2022-04-21 PROCEDURE — G0145 SCR C/V CYTO,THINLAYER,RESCR: HCPCS | Performed by: FAMILY MEDICINE

## 2022-04-21 PROCEDURE — 99207 PR FOOT EXAM NO CHARGE: CPT | Mod: 25 | Performed by: FAMILY MEDICINE

## 2022-04-21 RX ORDER — SIMVASTATIN 40 MG
TABLET ORAL
Qty: 90 TABLET | Refills: 3 | Status: SHIPPED | OUTPATIENT
Start: 2022-04-21 | End: 2023-04-11

## 2022-04-21 RX ORDER — OMEPRAZOLE 40 MG/1
40 CAPSULE, DELAYED RELEASE ORAL DAILY
Qty: 90 CAPSULE | Refills: 3 | Status: SHIPPED | OUTPATIENT
Start: 2022-04-21 | End: 2023-04-11

## 2022-04-21 RX ORDER — METFORMIN HCL 500 MG
TABLET, EXTENDED RELEASE 24 HR ORAL
Qty: 360 TABLET | Refills: 1 | Status: SHIPPED | OUTPATIENT
Start: 2022-04-21 | End: 2022-11-17

## 2022-04-21 RX ORDER — LOSARTAN POTASSIUM 25 MG/1
25 TABLET ORAL DAILY
Qty: 90 TABLET | Refills: 3 | Status: SHIPPED | OUTPATIENT
Start: 2022-04-21 | End: 2023-04-11

## 2022-04-21 ASSESSMENT — ENCOUNTER SYMPTOMS
FREQUENCY: 0
NERVOUS/ANXIOUS: 0
HEMATURIA: 0
HEARTBURN: 1
FEVER: 0
PARESTHESIAS: 0
NAUSEA: 0
SHORTNESS OF BREATH: 0
ARTHRALGIAS: 0
BREAST MASS: 0
CHILLS: 0
DYSURIA: 0
HEMATOCHEZIA: 0
DIZZINESS: 0
ABDOMINAL PAIN: 0
MYALGIAS: 0
JOINT SWELLING: 0
DIARRHEA: 0
CONSTIPATION: 0
PALPITATIONS: 0
COUGH: 0
WEAKNESS: 0
EYE PAIN: 0
SORE THROAT: 0
HEADACHES: 0

## 2022-04-21 ASSESSMENT — PAIN SCALES - GENERAL: PAINLEVEL: NO PAIN (0)

## 2022-04-21 NOTE — PROGRESS NOTES
SUBJECTIVE:   CC: May Wade is an 51 year old woman who presents for preventive health visit.     Patient has been advised of split billing requirements and indicates understanding: Yes  Healthy Habits:     Getting at least 3 servings of Calcium per day:  Yes    Bi-annual eye exam:  NO    Dental care twice a year:  Yes    Sleep apnea or symptoms of sleep apnea:  None    Diet:  Diabetic and Carbohydrate counting    Frequency of exercise:  2-3 days/week    Duration of exercise:  15-30 minutes    Taking medications regularly:  Yes    Medication side effects:  None    PHQ-2 Total Score: 0    Additional concerns today:  No    Diabetes Follow-up    How often are you checking your blood sugar? A few times a week  What time of day are you checking your blood sugars (select all that apply)?  Before and after meals  Have you had any blood sugars above 200?  No  Have you had any blood sugars below 70?  No    What symptoms do you notice when your blood sugar is low?  Shaky    What concerns do you have today about your diabetes? None     Do you have any of these symptoms? (Select all that apply)  Weight gain 5 lbs in the past 2 months     Have you had a diabetic eye exam in the last 12 months? No     Patient states she has a very bad gross tasting burp, tastes like carbonated banana. And also has the sensation of smelling smoke.           Hyperlipidemia Follow-Up      Are you regularly taking any medication or supplement to lower your cholesterol?   Yes-      Are you having muscle aches or other side effects that you think could be caused by your cholesterol lowering medication?  No    Hypertension Follow-up      Do you check your blood pressure regularly outside of the clinic? No     Are you following a low salt diet? Yes    Are your blood pressures ever more than 140 on the top number (systolic) OR more   than 90 on the bottom number (diastolic), for example 140/90? No    BP Readings from Last 2 Encounters:   03/29/22  120/82   03/15/22 118/82     Hemoglobin A1C POCT (%)   Date Value   03/19/2021 6.6 (H)   08/26/2020 6.6 (H)     LDL Cholesterol Calculated (mg/dL)   Date Value   03/19/2021 42   02/14/2020 38         Today's PHQ-2 Score:   PHQ-2 ( 1999 [employer]) 4/21/2022   Q1: Little interest or pleasure in doing things 0   Q2: Feeling down, depressed or hopeless 0   PHQ-2 Score 0   PHQ-2 Total Score (12-17 Years)- Positive if 3 or more points; Administer PHQ-A if positive -   Q1: Little interest or pleasure in doing things Not at all   Q2: Feeling down, depressed or hopeless Not at all   PHQ-2 Score 0       Abuse: Current or Past (Physical, Sexual or Emotional) - No  Do you feel safe in your environment? Yes    Have you ever done Advance Care Planning? (For example, a Health Directive, POLST, or a discussion with a medical provider or your loved ones about your wishes): No, advance care planning information given to patient to review.  Patient declined advance care planning discussion at this time.    Social History     Tobacco Use     Smoking status: Never Smoker     Smokeless tobacco: Never Used   Substance Use Topics     Alcohol use: Yes     Comment: 1 drink every 2 weeks     Alcohol Use 4/21/2022   Prescreen: >3 drinks/day or >7 drinks/week? No   Prescreen: >3 drinks/day or >7 drinks/week? -       Reviewed orders with patient.  Reviewed health maintenance and updated orders accordingly - Yes  Lab work is in process  Labs reviewed in EPIC    Breast Cancer Screening:    Breast CA Risk Assessment (FHS-7) 3/15/2022   Do you have a family history of breast, colon, or ovarian cancer? No / Unknown       Mammogram Screening: Recommended annual mammography  Pertinent mammograms are reviewed under the imaging tab.    History of abnormal Pap smear: NO - age 30-65 PAP every 5 years with negative HPV co-testing recommended  PAP / HPV Latest Ref Rng & Units 2/29/2016 1/11/2013 2/2/2009   PAP (Historical) - NIL NIL NIL   HPV16 NEG Negative - -    HPV18 NEG Negative - -   HRHPV NEG Negative - -     Reviewed and updated as needed this visit by clinical staff                    Reviewed and updated as needed this visit by Provider                       Review of Systems   Constitutional: Negative for chills and fever.   HENT: Negative for congestion, ear pain, hearing loss and sore throat.    Eyes: Negative for pain and visual disturbance.   Respiratory: Negative for cough and shortness of breath.    Cardiovascular: Negative for chest pain, palpitations and peripheral edema.   Gastrointestinal: Positive for heartburn. Negative for abdominal pain, constipation, diarrhea, hematochezia and nausea.   Breasts:  Negative for tenderness, breast mass and discharge.   Genitourinary: Negative for dysuria, frequency, genital sores, hematuria, pelvic pain, urgency, vaginal bleeding and vaginal discharge.   Musculoskeletal: Negative for arthralgias, joint swelling and myalgias.   Skin: Negative for rash.   Neurological: Negative for dizziness, weakness, headaches and paresthesias.   Psychiatric/Behavioral: Negative for mood changes. The patient is not nervous/anxious.           OBJECTIVE:   There were no vitals taken for this visit.  Physical Exam  GENERAL: healthy, alert and no distress  EYES: Eyes grossly normal to inspection, PERRL and conjunctivae and sclerae normal  HENT: ear canals and TM's normal, nose and mouth without ulcers or lesions  NECK: no adenopathy, no asymmetry, masses, or scars and thyroid normal to palpation  RESP: lungs clear to auscultation - no rales, rhonchi or wheezes  BREAST: normal without masses, tenderness or nipple discharge and no palpable axillary masses or adenopathy  CV: regular rate and rhythm, normal S1 S2, no S3 or S4, no murmur, click or rub, no peripheral edema and peripheral pulses strong  ABDOMEN: soft, nontender, no hepatosplenomegaly, no masses and bowel sounds normal   (female): normal female external genitalia, normal  urethral meatus , vaginal mucosa pink, moist, well rugated, normal cervix, adnexae, and uterus without masses. and pap obtained  MS: no gross musculoskeletal defects noted, no edema  SKIN: no suspicious lesions or rashes  NEURO: Normal strength and tone, mentation intact and speech normal  PSYCH: mentation appears normal, affect normal/bright    Diagnostic Test Results:  Labs reviewed in Epic  Results for orders placed or performed in visit on 04/21/22 (from the past 24 hour(s))   Hemoglobin A1c   Result Value Ref Range    Hemoglobin A1C 8.0 (H) 0.0 - 5.6 %       ASSESSMENT/PLAN:   (Z00.00) Routine general medical examination at a health care facility  (primary encounter diagnosis)  Comment:    Plan:      (E11.29,  R80.9) Type 2 diabetes mellitus with microalbuminuria, without long-term current use of insulin (H)  Comment: uncontrolled, increase ozempic to 1 mg weekly  Recheck HgbA1c in 3 months  Plan: Albumin Random Urine Quantitative with Creat         Ratio, Comprehensive metabolic panel, Lipid         panel reflex to direct LDL Fasting, Hemoglobin         A1c, FOOT EXAM, empagliflozin (JARDIANCE) 25 MG        TABS tablet, Semaglutide, 1 MG/DOSE, 4 MG/3ML         SOPN, metFORMIN (GLUCOPHAGE-XR) 500 MG 24 hr         tablet, losartan (COZAAR) 25 MG tablet,         Hemoglobin A1c             (Z12.4) Screening for cervical cancer  Comment:    Plan: Pap screen with HPV - recommended age 30 - 65         years             (I10) HTN, goal below 140/90  Comment: well controlled   Plan: losartan (COZAAR) 25 MG tablet             (E78.5) Hyperlipidemia LDL goal <100  Comment:    Plan: simvastatin (ZOCOR) 40 MG tablet             (R05.9) Cough  Comment:    Plan:      (K21.9) Gastroesophageal reflux disease without esophagitis  Comment:  Increased symptoms.  Increase dose from 20 mg to 40 mg daily  Plan: omeprazole (PRILOSEC) 40 MG DR capsule               Patient has been advised of split billing requirements and indicates  "understanding: Yes    COUNSELING:  Reviewed preventive health counseling, as reflected in patient instructions       Regular exercise       Healthy diet/nutrition    Estimated body mass index is 29.47 kg/m  as calculated from the following:    Height as of 3/29/22: 1.473 m (4' 10\").    Weight as of 3/29/22: 64 kg (141 lb).    Weight management plan: Discussed healthy diet and exercise guidelines    She reports that she has never smoked. She has never used smokeless tobacco.      Counseling Resources:  ATP IV Guidelines  Pooled Cohorts Equation Calculator  Breast Cancer Risk Calculator  BRCA-Related Cancer Risk Assessment: FHS-7 Tool  FRAX Risk Assessment  ICSI Preventive Guidelines  Dietary Guidelines for Americans, 2010  USDA's MyPlate  ASA Prophylaxis  Lung CA Screening    Julia Reyna MD  [facility]  "

## 2022-04-26 LAB
BKR LAB AP GYN ADEQUACY: NORMAL
BKR LAB AP GYN INTERPRETATION: NORMAL
BKR LAB AP HPV REFLEX: NORMAL
BKR LAB AP PREVIOUS ABNORMAL: NORMAL
PATH REPORT.COMMENTS IMP SPEC: NORMAL
PATH REPORT.COMMENTS IMP SPEC: NORMAL
PATH REPORT.RELEVANT HX SPEC: NORMAL

## 2022-04-27 LAB
HUMAN PAPILLOMA VIRUS 16 DNA: NEGATIVE
HUMAN PAPILLOMA VIRUS 18 DNA: NEGATIVE
HUMAN PAPILLOMA VIRUS FINAL DIAGNOSIS: NORMAL
HUMAN PAPILLOMA VIRUS OTHER HR: NEGATIVE

## 2022-06-20 ENCOUNTER — ANCILLARY PROCEDURE (OUTPATIENT)
Dept: MAMMOGRAPHY | Facility: CLINIC | Age: 52
End: 2022-06-20
Attending: FAMILY MEDICINE
Payer: COMMERCIAL

## 2022-06-20 DIAGNOSIS — Z12.31 VISIT FOR SCREENING MAMMOGRAM: ICD-10-CM

## 2022-06-20 PROCEDURE — 77067 SCR MAMMO BI INCL CAD: CPT | Mod: TC | Performed by: RADIOLOGY

## 2022-06-20 PROCEDURE — 77063 BREAST TOMOSYNTHESIS BI: CPT | Mod: TC | Performed by: RADIOLOGY

## 2022-08-13 ENCOUNTER — HOSPITAL ENCOUNTER (EMERGENCY)
Facility: CLINIC | Age: 52
Discharge: HOME | End: 2022-08-13
Attending: PHYSICIAN ASSISTANT | Admitting: PHYSICIAN ASSISTANT
Payer: COMMERCIAL

## 2022-08-13 VITALS
SYSTOLIC BLOOD PRESSURE: 134 MMHG | OXYGEN SATURATION: 97 % | WEIGHT: 135 LBS | TEMPERATURE: 98.6 F | DIASTOLIC BLOOD PRESSURE: 56 MMHG | HEART RATE: 90 BPM | BODY MASS INDEX: 28.22 KG/M2

## 2022-08-13 DIAGNOSIS — N39.0 URINARY TRACT INFECTION: ICD-10-CM

## 2022-08-13 LAB
ALBUMIN UR-MCNC: NEGATIVE MG/DL
APPEARANCE UR: ABNORMAL
BACTERIA #/AREA URNS HPF: ABNORMAL /HPF
BILIRUB UR QL STRIP: NEGATIVE
COLOR UR AUTO: ABNORMAL
GLUCOSE UR STRIP-MCNC: 1000 MG/DL
HGB UR QL STRIP: NEGATIVE
KETONES UR STRIP-MCNC: NEGATIVE MG/DL
LEUKOCYTE ESTERASE UR QL STRIP: ABNORMAL
MUCOUS THREADS #/AREA URNS LPF: PRESENT /LPF
NITRATE UR QL: NEGATIVE
PH UR STRIP: 5 [PH] (ref 5–7)
RBC URINE: 4 /HPF
SP GR UR STRIP: 1 (ref 1–1.03)
SQUAMOUS EPITHELIAL: 1 /HPF
UROBILINOGEN UR STRIP-MCNC: NORMAL MG/DL
WBC URINE: 17 /HPF

## 2022-08-13 PROCEDURE — 81001 URINALYSIS AUTO W/SCOPE: CPT | Performed by: PHYSICIAN ASSISTANT

## 2022-08-13 PROCEDURE — 87086 URINE CULTURE/COLONY COUNT: CPT | Performed by: PHYSICIAN ASSISTANT

## 2022-08-13 PROCEDURE — 99214 OFFICE O/P EST MOD 30 MIN: CPT | Performed by: PHYSICIAN ASSISTANT

## 2022-08-13 PROCEDURE — G0463 HOSPITAL OUTPT CLINIC VISIT: HCPCS | Performed by: PHYSICIAN ASSISTANT

## 2022-08-13 RX ORDER — PHENAZOPYRIDINE HYDROCHLORIDE 200 MG/1
200 TABLET, FILM COATED ORAL 3 TIMES DAILY
Qty: 9 TABLET | Refills: 0 | Status: SHIPPED | OUTPATIENT
Start: 2022-08-13 | End: 2022-08-16

## 2022-08-13 RX ORDER — SULFAMETHOXAZOLE/TRIMETHOPRIM 800-160 MG
1 TABLET ORAL 2 TIMES DAILY
Qty: 10 TABLET | Refills: 0 | Status: SHIPPED | OUTPATIENT
Start: 2022-08-13 | End: 2022-08-18

## 2022-08-13 ASSESSMENT — ENCOUNTER SYMPTOMS
DYSURIA: 1
HEMATURIA: 1
GASTROINTESTINAL NEGATIVE: 1
FREQUENCY: 1
MUSCULOSKELETAL NEGATIVE: 1
CONSTITUTIONAL NEGATIVE: 1
FEVER: 0

## 2022-08-13 ASSESSMENT — ACTIVITIES OF DAILY LIVING (ADL): ADLS_ACUITY_SCORE: 35

## 2022-08-13 NOTE — ED PROVIDER NOTES
History     Chief Complaint   Patient presents with     Rule out Urinary Tract Infection     Patient presents today with possible bladder infection. Pt has noticed increase in frequency, pain when urinating, and some blood in urine. Symptoms started yesterday . Arrived to urgent care ambulatory .       LAURA Wade is a 51 year old female with history of type 2 diabetes mellitus who presents with complaints of dysuria, increased urinary urgency and frequency, and now hematuria over the past 3 days.  Patient states she has had a urinary tract infection in the past but this was years ago.  Denies fevers, chills, nausea, vomiting, abdominal pain, or back or flank pain.      Allergies:  Allergies   Allergen Reactions     Pcn [Penicillins] Nausea and Vomiting     Aspartame Other (See Comments)     Severe pain, passing out     Benzoin Dermatitis     Tincture of benzoin - skin burn/rash        Problem List:    Patient Active Problem List    Diagnosis Date Noted     Type 2 diabetes mellitus with microalbuminuria, without long-term current use of insulin (H) 02/07/2018     Priority: Medium     overweight BMI >30 10/18/2015     Priority: Medium     Esophageal reflux 02/17/2014     Priority: Medium     HTN, goal below 140/90 12/09/2013     Priority: Medium     Hypertriglyceridemia 10/26/2010     Priority: Medium     Hyperlipidemia LDL goal <100 10/26/2010     Priority: Medium        Past Medical History:    Past Medical History:   Diagnosis Date     cervical dysplasia 1990     Chronic hip pain 10/25/2006     gestational diabetes      Migraines      Pure hypercholesterolemia        Past Surgical History:    Past Surgical History:   Procedure Laterality Date     COLONOSCOPY N/A 6/3/2019    Procedure: COLONOSCOPY, WITH POLYPECTOMY AND BIOPSY;  Surgeon: Live Cao MD;  Location: [facility]     COLONOSCOPY N/A 6/11/2021    Procedure: COLONOSCOPY, WITH POLYPECTOMY AND BIOPSY;  Surgeon: Billy Rivera DO;   Location: [facility]     CRYOTHERAPY, CERVICAL        HYSTEROSCOPY, SURGICAL; W/ ENDOMETRIAL ABLATION, ANY METHOD  08    Novasure     SURGICAL HISTORY OF -   2004    Left wrist dorsal ganglion cyst excision-Removal of the recurrent dorsal boss.     SURGICAL HISTORY OF -       Right Ankle Surgery (Ganglion Cyst)     SURGICAL HISTORY OF -       Breast Reduction     SURGICAL HISTORY OF -       Abdominal Wall Revision     SURGICAL HISTORY OF -   2003    Left Ankle Cyst Removal     SURGICAL HISTORY OF -       Left ankle surgery for ganglion cyst,      SURGICAL HISTORY OF -       Left wrist bone and cyst surgery       Family History:    Family History   Problem Relation Age of Onset     C.A.D. Father          age 51     Respiratory Father         smoker     Cerebrovascular Disease Maternal Grandmother      Hypertension Maternal Grandmother      C.A.D. Paternal Grandfather      Asthma No family hx of      Diabetes No family hx of      Breast Cancer No family hx of      Cancer - colorectal No family hx of      Prostate Cancer No family hx of        Social History:  Marital Status:   [2]  Social History     Tobacco Use     Smoking status: Never Smoker     Smokeless tobacco: Never Used   Vaping Use     Vaping Use: Never used   Substance Use Topics     Alcohol use: Yes     Comment: 1 drink every 2 weeks     Drug use: No        Medications:    phenazopyridine (PYRIDIUM) 200 MG tablet  sulfamethoxazole-trimethoprim (BACTRIM DS) 800-160 MG tablet  ACCU-CHEK GUIDE test strip  blood glucose (NO BRAND SPECIFIED) lancets standard  Blood Glucose Monitoring Suppl (ACCU-CHEK GUIDE ME) w/Device KIT  empagliflozin (JARDIANCE) 25 MG TABS tablet  fish oil-omega-3 fatty acids (OMEGA 3) 1000 MG capsule  losartan (COZAAR) 25 MG tablet  metFORMIN (GLUCOPHAGE-XR) 500 MG 24 hr tablet  omeprazole (PRILOSEC) 20 MG DR capsule  omeprazole (PRILOSEC) 40 MG DR capsule  Semaglutide, 1 MG/DOSE, 4 MG/3ML SOPN  simvastatin  (ZOCOR) 40 MG tablet          Review of Systems   Constitutional: Negative.  Negative for fever.   Gastrointestinal: Negative.    Genitourinary: Positive for dysuria, frequency, hematuria and urgency.   Musculoskeletal: Negative.    Skin: Negative.    All other systems reviewed and are negative.      Physical Exam   BP: 134/56  Pulse: 90  Temp: 98.6  F (37  C)  Weight: 61.2 kg (135 lb)  SpO2: 97 %      Physical Exam  Constitutional:       General: She is not in acute distress.     Appearance: Normal appearance. She is not ill-appearing, toxic-appearing or diaphoretic.   HENT:      Head: Normocephalic and atraumatic.      Right Ear: External ear normal.      Left Ear: External ear normal.      Nose: Nose normal.   Eyes:      Conjunctiva/sclera: Conjunctivae normal.   Cardiovascular:      Rate and Rhythm: Normal rate and regular rhythm.      Heart sounds: Normal heart sounds.   Pulmonary:      Effort: Pulmonary effort is normal.      Breath sounds: Normal breath sounds.   Abdominal:      General: There is no distension.      Palpations: Abdomen is soft.      Tenderness: There is no abdominal tenderness. There is no right CVA tenderness, left CVA tenderness, guarding or rebound.   Musculoskeletal:      Cervical back: Neck supple.   Skin:     General: Skin is warm and dry.   Neurological:      Mental Status: She is alert.         ED Course                 Procedures    Results for orders placed or performed during the hospital encounter of 08/13/22 (from the past 24 hour(s))   UA with Microscopic reflex to Culture    Specimen: Urine, Clean Catch   Result Value Ref Range    Color Urine Straw Colorless, Straw, Light Yellow, Yellow    Appearance Urine Slightly Cloudy (A) Clear    Glucose Urine 1000  (A) Negative mg/dL    Bilirubin Urine Negative Negative    Ketones Urine Negative Negative mg/dL    Specific Gravity Urine 1.005 1.003 - 1.035    Blood Urine Negative Negative    pH Urine 5.0 5.0 - 7.0    Protein Albumin Urine  Negative Negative mg/dL    Urobilinogen Urine Normal Normal, 2.0 mg/dL    Nitrite Urine Negative Negative    Leukocyte Esterase Urine Small (A) Negative    Bacteria Urine Few (A) None Seen /HPF    Mucus Urine Present (A) None Seen /LPF    RBC Urine 4 (H) <=2 /HPF    WBC Urine 17 (H) <=5 /HPF    Squamous Epithelials Urine 1 <=1 /HPF    Narrative    Urine Culture ordered based on laboratory criteria       Medications - No data to display    Assessments & Plan (with Medical Decision Making)     Pt is a 51 year old female with history of type 2 diabetes mellitus who presents with complaints of dysuria, increased urinary urgency and frequency, and now hematuria over the past 3 days.      Pt is afebrile on arrival.  Exam as above.  Urinalysis was positive for small leuk esterase and 17 WBCs.  Urine was sent for culture.  Discussed results with patient.  Return precautions were reviewed.  Hand-outs were provided.    Patient was sent with Bactrim and Pyridium and was instructed to follow-up with PCP in 3-5 days for continued care and management or sooner if new or worsening symptoms.  She is to return to the ED for persistent and/or worsening symptoms.  Patient expressed understanding of the diagnosis and plan and was discharged home in good condition.    I have reviewed the nursing notes.    I have reviewed the findings, diagnosis, plan and need for follow up with the patient.    New Prescriptions    PHENAZOPYRIDINE (PYRIDIUM) 200 MG TABLET    Take 1 tablet (200 mg) by mouth 3 times daily for 3 days    SULFAMETHOXAZOLE-TRIMETHOPRIM (BACTRIM DS) 800-160 MG TABLET    Take 1 tablet by mouth 2 times daily for 5 days       Final diagnoses:   Urinary tract infection       8/13/2022   [facility] EMERGENCY DEPT      Disclaimer:  This note consists of symbols derived from keyboarding, dictation and/or voice recognition software.  As a result, there may be errors in the script that have gone undetected.  Please  consider this when interpreting information found in this chart.     Daniella Leon PA-C  08/13/22 [id number]       Daniella Leon PA-C  08/13/22 [id number]

## 2022-08-14 NOTE — RESULT ENCOUNTER NOTE
[facility] Emergency Dept discharge antibiotic (if prescribed): Sulfamethoxazole-Trimethoprim (Bactrim DS, Septra DS) 800-160 mg PO tablet,  1 tablet by mouth 2 times daily for 5 days.   Date of Rx (if applicable):  8/13/22  No changes in treatment per [facility] ED Lab Result Urine culture protocol.

## 2022-08-15 LAB — BACTERIA UR CULT: NORMAL

## 2022-08-15 NOTE — RESULT ENCOUNTER NOTE
Final urine culture report is negative.  Adult Negative Urine culture parameters per protocol: Any # Urogenital single or mixed organism, <10,000 col/ml single organism (cath/midstream), and > 3 organisms (No susceptibilities performed).  [facility] Emergency Dept discharge antibiotic prescribed (If applicable): Bactrim DS  Treatment recommendations per [facility] ED Lab Result Urine Culture protocol.

## 2022-10-02 ENCOUNTER — TELEPHONE (OUTPATIENT)
Dept: FAMILY MEDICINE | Facility: CLINIC | Age: 52
End: 2022-10-02

## 2022-10-02 DIAGNOSIS — E11.29 TYPE 2 DIABETES MELLITUS WITH MICROALBUMINURIA, WITHOUT LONG-TERM CURRENT USE OF INSULIN (H): ICD-10-CM

## 2022-10-02 DIAGNOSIS — R80.9 TYPE 2 DIABETES MELLITUS WITH MICROALBUMINURIA, WITHOUT LONG-TERM CURRENT USE OF INSULIN (H): ICD-10-CM

## 2022-10-03 RX ORDER — SEMAGLUTIDE 1.34 MG/ML
INJECTION, SOLUTION SUBCUTANEOUS
Qty: 12 ML | Refills: 0 | Status: SHIPPED | OUTPATIENT
Start: 2022-10-03 | End: 2022-10-03

## 2022-10-03 RX ORDER — SEMAGLUTIDE 1.34 MG/ML
1 INJECTION, SOLUTION SUBCUTANEOUS WEEKLY
Qty: 3 ML | Refills: 0 | Status: SHIPPED | OUTPATIENT
Start: 2022-10-03 | End: 2022-11-04

## 2022-10-03 NOTE — TELEPHONE ENCOUNTER
RN refilled Ozempic for 12 mL which is a 4 month supply.    I would like this to only be a 1 month supply as she is overdue for a visit.    Call pharmacy to change please.    Julia Reyna M.D.

## 2022-10-03 NOTE — TELEPHONE ENCOUNTER
Contacted Carolee in pharmacy and order is cancelled  Sent new RX for 3 ml which is 4 weeks  Patient notified via my chart to schedule appointment    Ethel Bhatti RN on 10/3/2022 at 3:50 PM

## 2022-10-22 ENCOUNTER — HEALTH MAINTENANCE LETTER (OUTPATIENT)
Age: 52
End: 2022-10-22

## 2022-11-17 ENCOUNTER — TELEPHONE (OUTPATIENT)
Dept: FAMILY MEDICINE | Facility: CLINIC | Age: 52
End: 2022-11-17

## 2022-11-17 ENCOUNTER — OFFICE VISIT (OUTPATIENT)
Dept: FAMILY MEDICINE | Facility: CLINIC | Age: 52
End: 2022-11-17
Payer: COMMERCIAL

## 2022-11-17 VITALS
BODY MASS INDEX: 27.71 KG/M2 | TEMPERATURE: 98.7 F | WEIGHT: 132 LBS | HEIGHT: 58 IN | DIASTOLIC BLOOD PRESSURE: 60 MMHG | SYSTOLIC BLOOD PRESSURE: 100 MMHG | OXYGEN SATURATION: 99 % | HEART RATE: 102 BPM

## 2022-11-17 DIAGNOSIS — R80.9 TYPE 2 DIABETES MELLITUS WITH MICROALBUMINURIA, WITHOUT LONG-TERM CURRENT USE OF INSULIN (H): ICD-10-CM

## 2022-11-17 DIAGNOSIS — E11.29 TYPE 2 DIABETES MELLITUS WITH MICROALBUMINURIA, WITHOUT LONG-TERM CURRENT USE OF INSULIN (H): Primary | ICD-10-CM

## 2022-11-17 DIAGNOSIS — I10 HTN, GOAL BELOW 140/90: ICD-10-CM

## 2022-11-17 DIAGNOSIS — R80.9 TYPE 2 DIABETES MELLITUS WITH MICROALBUMINURIA, WITHOUT LONG-TERM CURRENT USE OF INSULIN (H): Primary | ICD-10-CM

## 2022-11-17 DIAGNOSIS — E11.29 TYPE 2 DIABETES MELLITUS WITH MICROALBUMINURIA, WITHOUT LONG-TERM CURRENT USE OF INSULIN (H): ICD-10-CM

## 2022-11-17 LAB — HBA1C MFR BLD: 8 % (ref 0–5.6)

## 2022-11-17 PROCEDURE — 36415 COLL VENOUS BLD VENIPUNCTURE: CPT | Performed by: FAMILY MEDICINE

## 2022-11-17 PROCEDURE — 83036 HEMOGLOBIN GLYCOSYLATED A1C: CPT | Performed by: FAMILY MEDICINE

## 2022-11-17 PROCEDURE — 99214 OFFICE O/P EST MOD 30 MIN: CPT | Performed by: FAMILY MEDICINE

## 2022-11-17 RX ORDER — METFORMIN HCL 500 MG
TABLET, EXTENDED RELEASE 24 HR ORAL
Qty: 360 TABLET | Refills: 1 | Status: SHIPPED | OUTPATIENT
Start: 2022-11-17 | End: 2023-05-25

## 2022-11-17 RX ORDER — SEMAGLUTIDE 1.34 MG/ML
1 INJECTION, SOLUTION SUBCUTANEOUS WEEKLY
Qty: 3 ML | Refills: 0 | Status: CANCELLED | OUTPATIENT
Start: 2022-11-17

## 2022-11-17 RX ORDER — SEMAGLUTIDE 2.68 MG/ML
2 INJECTION, SOLUTION SUBCUTANEOUS WEEKLY
Qty: 9 ML | Refills: 1 | Status: CANCELLED | OUTPATIENT
Start: 2022-11-17

## 2022-11-17 RX ORDER — SEMAGLUTIDE 2.68 MG/ML
2 INJECTION, SOLUTION SUBCUTANEOUS WEEKLY
Qty: 9 ML | Refills: 1 | Status: SHIPPED | OUTPATIENT
Start: 2022-11-17 | End: 2023-05-25

## 2022-11-17 ASSESSMENT — PAIN SCALES - GENERAL: PAINLEVEL: NO PAIN (0)

## 2022-11-17 NOTE — PROGRESS NOTES
"  Assessment & Plan     Type 2 diabetes mellitus with microalbuminuria, without long-term current use of insulin (H)   uncontrolled.    Increase ozempic to 2 mg per week  Recheck HgbA1c in 3 months  Reminded to get eye exam    - Hemoglobin A1c; Future  - Hemoglobin A1c  - metFORMIN (GLUCOPHAGE XR) 500 MG 24 hr tablet; TAKE TWO TABLETS BY MOUTH TWICE A DAY WITH MEALS.  - Semaglutide, 2 MG/DOSE, (OZEMPIC, 2 MG/DOSE,) 8 MG/3ML SOPN; Inject 2 mg Subcutaneous once a week  - Hemoglobin A1c; Future    HTN, goal below 140/90  Well controlled  Denies dizziness               BMI:   Estimated body mass index is 27.59 kg/m  as calculated from the following:    Height as of this encounter: 1.473 m (4' 10\").    Weight as of this encounter: 59.9 kg (132 lb).     Wt Readings from Last 5 Encounters:   11/17/22 59.9 kg (132 lb)   08/13/22 61.2 kg (135 lb)   04/21/22 65.7 kg (144 lb 12.8 oz)   03/29/22 64 kg (141 lb)   03/15/22 63.5 kg (140 lb)             No follow-ups on file.    Julia Reyna MD  [facility]    Rey Olvera is a 51 year old, presenting for the following health issues:  Diabetes and Health Maintenance (Patient wants to schedule flu, covid vaccine next week. Has plans this weekend. )    Chief Complaint   Patient presents with     Diabetes     Health Maintenance     Patient wants to schedule flu, covid vaccine next week. Has plans this weekend.          History of Present Illness       Diabetes:   She presents for follow up of diabetes.  She is checking home blood glucose a few times a week. She checks blood glucose before meals.  Blood glucose is never over 200 and never under 70. She is aware of hypoglycemia symptoms including shakiness, dizziness, weakness and lethargy. She has no concerns regarding her diabetes at this time.  She is not experiencing numbness or burning in feet, excessive thirst, blurry vision, weight changes or redness, sores or blisters on feet. The patient has " "not had a diabetic eye exam in the last 12 months.         She eats 4 or more servings of fruits and vegetables daily.She consumes 0 sweetened beverage(s) daily.She exercises with enough effort to increase her heart rate 20 to 29 minutes per day.  She exercises with enough effort to increase her heart rate 4 days per week.   She is taking medications regularly.       Hyperlipidemia Follow-Up      Are you regularly taking any medication or supplement to lower your cholesterol?   Yes- simvastatin    Are you having muscle aches or other side effects that you think could be caused by your cholesterol lowering medication?  No    Hypertension Follow-up      Do you check your blood pressure regularly outside of the clinic? No     Are you following a low salt diet? Yes    Are your blood pressures ever more than 140 on the top number (systolic) OR more   than 90 on the bottom number (diastolic), for example 140/90? No        Review of Systems   Constitutional, HEENT, cardiovascular, pulmonary, gi and gu systems are negative, except as otherwise noted.      Objective    /60   Pulse 102   Temp 98.7  F (37.1  C) (Tympanic)   Ht 1.473 m (4' 10\")   Wt 59.9 kg (132 lb)   SpO2 99%   BMI 27.59 kg/m    Body mass index is 27.59 kg/m .  Physical Exam   GENERAL: healthy, alert and no distress  NECK: no adenopathy, no asymmetry, masses, or scars and thyroid normal to palpation  RESP: lungs clear to auscultation - no rales, rhonchi or wheezes  CV: regular rate and rhythm, normal S1 S2, no S3 or S4, no murmur, click or rub, no peripheral edema and peripheral pulses strong  ABDOMEN: soft, nontender, no hepatosplenomegaly, no masses and bowel sounds normal  MS: no gross musculoskeletal defects noted, no edema    Results for orders placed or performed in visit on 11/17/22   Hemoglobin A1c     Status: Abnormal   Result Value Ref Range    Hemoglobin A1C 8.0 (H) 0.0 - 5.6 %    Narrative    Prev 8.0                     "

## 2022-11-17 NOTE — PATIENT INSTRUCTIONS
Our Clinic hours are:  Mondays    7:20 am - 7 pm  Tues -  Fri  7:20 am - 5 pm    Clinic Phone: [phone]    The clinic lab opens at 7:30 am Mon - Fri and appointments are required.    [facility]. [phone]  Monday  8 am - 7pm  Tues - Fri 8 am - 5:30 pm

## 2022-11-17 NOTE — TELEPHONE ENCOUNTER
We are unable to get any mg of Ozempic at this time, and I am not sure of when it will be back in stock at our distributor. The representative came by yesterday and said that if we have patients that are on it, you can call him to get samples of the medication. Here is the phone number and name of the rep., Stefan Mora [phone]. He is from [place]. We in the pharmacy are unable to give out samples. Any questions please let me know.     If you want her to take something else, you can certainly send in another RX for something else. I am going to profile the Ozempic on her file.    Thank you,    Madison Valdez  Certified Pharmacy Technician II, [facility]  Ph: [phone]  Fx: [phone]

## 2022-11-21 NOTE — TELEPHONE ENCOUNTER
Talked with pharmacy rep.  They will get us some samples and drop off tomorrow.  Martha Aneta agreed to sign for it.       Left message for patient with this information.    Julia Reyna M.D.

## 2022-11-22 NOTE — TELEPHONE ENCOUNTER
Writer called patient and left message requesting a call back to clinic.    First attempt.    Keven ROMERO [facility]

## 2022-11-22 NOTE — TELEPHONE ENCOUNTER
Patient returned call and notified samples at [facility]. She will  tomorrow.  Ethel ROD RN

## 2022-11-22 NOTE — TELEPHONE ENCOUNTER
ozempic samples put in triage room fridge and labled in bag. Patient can come to .     ASTRID Barros CNP

## 2022-11-23 ENCOUNTER — ALLIED HEALTH/NURSE VISIT (OUTPATIENT)
Dept: FAMILY MEDICINE | Facility: CLINIC | Age: 52
End: 2022-11-23
Payer: COMMERCIAL

## 2022-11-23 VITALS — DIASTOLIC BLOOD PRESSURE: 68 MMHG | SYSTOLIC BLOOD PRESSURE: 108 MMHG

## 2022-11-23 DIAGNOSIS — I10 HTN, GOAL BELOW 140/90: Primary | ICD-10-CM

## 2022-11-23 PROCEDURE — 99207 PR NO CHARGE NURSE ONLY: CPT | Performed by: FAMILY MEDICINE

## 2022-11-23 NOTE — PROGRESS NOTES
May Wade was evaluated at [facility] on November 23, 2022 at which time her blood pressure was:    BP Readings from Last 3 Encounters:   11/23/22 108/68   11/17/22 100/60   08/13/22 134/56     Pulse Readings from Last 3 Encounters:   11/17/22 102   08/13/22 90   04/21/22 103       Reviewed lifestyle modifications for blood pressure control and reduction: including making healthy food choices, managing weight, getting regular exercise, smoking cessation, reducing alcohol consumption, monitoring blood pressure regularly.     Symptoms: None    BP Goal:< 140/90 mmHg    BP Assessment:  BP at goal    Potential Reasons for BP too high: NA - Not applicable    BP Follow-Up Plan: Recheck BP in 6 months at pharmacy    Recommendation to Provider: -    Note completed by: Tiny Howell RPh  [facility]

## 2022-11-23 NOTE — TELEPHONE ENCOUNTER
Writer handed medication to patient's  in the lobby of the [facility].  Patient was over at the pharmacy getting a flu shot at the time.    Keven ROMERO [facility]

## 2022-12-27 NOTE — LETTER
"    4/6/2018         RE: May Wade  [id number] [facility] [id number]        Dear Colleague,    Thank you for referring your patient, May Wade, to the [facility]. Please see a copy of my visit note below.    Sports Medicine Clinic Visit    PCP: Julia Reyna    May Wade is a 47 year old female who is seen  as a self referral presenting with right > left hip pain.    Injury: Patient denies specific injury. Reports chronic bilateral hip pain and IT band pain for ~25 years. Reports significant increase in hip pain (R > L) and \"aching into the bone\" of the femurs over the past month.  No new injury.  Likely overuse as she skates frequently.    Location of Pain: bilateral hips (R > L)  Duration of Pain: 1 month(s)  Rating of Pain at worst: 7/10  Rating of Pain Currently: 5/10  Symptoms are better with: e-stim  Symptoms are worse with: standing, sleeping, skating  Additional Features:   Positive: popping, grinding and catching, sharp pain   Negative: swelling, bruising, locking, instability, paresthesias, numbness, weakness, pain in other joints and systemic symptoms  Other evaluation and/or treatments so far consists of: Tylenol and e-stim  Prior History of related problems: Chronic bilateral hip pain for ~25 years    Social History: Figure skating     Review of Systems  Skin: no bruising, no swelling  Musculoskeletal: as above  Neurologic: no numbness, paresthesias  Remainder of review of systems is negative including constitutional, CV, pulmonary, GI, except as noted in HPI or medical history.    Patient's current problem list, past medical and surgical history, and family history were reviewed.    Patient Active Problem List   Diagnosis     Hypertriglyceridemia     Hyperlipidemia LDL goal <100     Uncomplicated type 2 diabetes mellitus (H)     HTN, goal below 140/90     Esophageal reflux     overweight BMI >30     Type 2 diabetes mellitus with " Verified message with patient  She plans to follow up with vascular  Will reach out with further questions or concerns  Debo ARIAS RN     "microalbuminuria, without long-term current use of insulin (H)     Past Medical History:   Diagnosis Date     cervical dysplasia     s/p cryo, normal since     Chronic hip pain 10/25/2006    neurontin     gestational diabetes     pregnancy # 2     Migraines     on amitryptiline     Pure hypercholesterolemia     on lipitor     Past Surgical History:   Procedure Laterality Date     CRYOTHERAPY, CERVICAL       HC HYSTEROSCOPY, SURGICAL; W/ ENDOMETRIAL ABLATION, ANY METHOD  08    Novasure     SURGICAL HISTORY OF -   2004    Left wrist dorsal ganglion cyst excision-Removal of the recurrent dorsal boss.     SURGICAL HISTORY OF -       Right Ankle Surgery (Ganglion Cyst)     SURGICAL HISTORY OF -       Breast Reduction     SURGICAL HISTORY OF -       Abdominal Wall Revision     SURGICAL HISTORY OF -   2003    Left Ankle Cyst Removal     SURGICAL HISTORY OF -       Left ankle surgery for ganglion cyst,      SURGICAL HISTORY OF -       Left wrist bone and cyst surgery     Family History   Problem Relation Age of Onset     C.A.D. Father       age 51     Respiratory Father      smoker     CEREBROVASCULAR DISEASE Maternal Grandmother      Hypertension Maternal Grandmother      C.A.D. Paternal Grandfather      Asthma No family hx of      DIABETES No family hx of      Breast Cancer No family hx of      Cancer - colorectal No family hx of      Prostate Cancer No family hx of        Objective  /78 (BP Location: Left arm, Patient Position: Sitting, Cuff Size: Adult Regular)  Ht 4' 11\" (1.499 m)  Wt 147 lb (66.7 kg)  BMI 29.69 kg/m2    GENERAL APPEARANCE: healthy, alert and no distress   GAIT: NORMAL  SKIN: no suspicious lesions or rashes  HEENT: Sclera clear, anicteric  CV: good peripheral pulses  RESP: Breathing not labored  NEURO: Normal strength and tone, mentation intact and speech normal  PSYCH:  mentation appears normal and affect normal/bright    Bilateral hip exam    Inspection:     "  no edema or ecchymosis in hip area    Tender:      greater trochanter bilateral       groin bilateral    Non Tender:      remainder of the hip area bilateral    ROM:     Full active and passive ROM  Bilateral - pain with end ROM in internal and external ROM    Strength:      flexion 5/5 bilateral       abduction 5/5 bilateral       adduction 5/5 bilateral    Sensation:      grossly intact in hip and thigh    Special Tests:      positive (+) SARAH bilateral       positive (+) FADIR bilateral       positive (+) scour bilateral       positive (+) fulcrum test  bilateral    Radiology  I ordered, visualized and reviewed these images with the patient  PELVIS AND BILATERAL HIP ONE VIEW  4/6/2018 4:04 PM    HISTORY: Pain of both hip joints.    COMPARISON: None.  IMPRESSION: No acute fracture or dislocation. No evidence of  arthritis.    Assessment:  1. Pain of both hip joints      Chronic bilateral hip pain, worse recently.  Broad differential given exam today including labral tear, greater trochanteric pain syndrome, SI joint involvement.  We discussed the following treatment options: symptom treatment, activity modification/rest, imaging, rehab and referral. Following discussion, plan: will obtain MR arthrogram to evaluate for labral tearing and consider therapy or referral pending clinical course.    Plan:  - Today's Plan of Care:  MRI of the hips--Call [phone] to schedule MRI  Rehab: Physical Therapy: [facility] - [phone]    -We also discussed other future treatment options:  Surgical referral    Follow Up: In clinic with Dr. Lieberman after MRI (wait at least 1-2 days)    Concerning signs and symptoms were reviewed.  The patient expressed understanding of this management plan and all questions were answered at this time.    Krista Lieberman MD CAQ  Primary Care Sports Medicine  [facility]    Again, thank you for allowing me to participate in the care of your patient.         Sincerely,        Krista Lieberman MD

## 2023-02-20 ENCOUNTER — HOSPITAL ENCOUNTER (EMERGENCY)
Facility: CLINIC | Age: 53
Discharge: HOME | End: 2023-02-20
Attending: NURSE PRACTITIONER | Admitting: NURSE PRACTITIONER
Payer: COMMERCIAL

## 2023-02-20 VITALS
WEIGHT: 130 LBS | DIASTOLIC BLOOD PRESSURE: 78 MMHG | HEIGHT: 58 IN | RESPIRATION RATE: 16 BRPM | OXYGEN SATURATION: 98 % | TEMPERATURE: 98.2 F | BODY MASS INDEX: 27.29 KG/M2 | SYSTOLIC BLOOD PRESSURE: 153 MMHG | HEART RATE: 102 BPM

## 2023-02-20 DIAGNOSIS — J02.0 STREPTOCOCCAL PHARYNGITIS: ICD-10-CM

## 2023-02-20 LAB — DEPRECATED S PYO AG THROAT QL EIA: POSITIVE

## 2023-02-20 PROCEDURE — 87880 STREP A ASSAY W/OPTIC: CPT | Performed by: NURSE PRACTITIONER

## 2023-02-20 PROCEDURE — 99283 EMERGENCY DEPT VISIT LOW MDM: CPT | Performed by: NURSE PRACTITIONER

## 2023-02-20 PROCEDURE — 87880 STREP A ASSAY W/OPTIC: CPT | Performed by: EMERGENCY MEDICINE

## 2023-02-20 RX ORDER — CEPHALEXIN 500 MG/1
500 CAPSULE ORAL 2 TIMES DAILY
Qty: 20 CAPSULE | Refills: 0 | Status: SHIPPED | OUTPATIENT
Start: 2023-02-20 | End: 2023-05-25

## 2023-02-20 ASSESSMENT — ACTIVITIES OF DAILY LIVING (ADL): ADLS_ACUITY_SCORE: 33

## 2023-02-21 NOTE — DISCHARGE INSTRUCTIONS
Start keflex and take this twice daily for 10 days  Follow up with primary care if no improvement by Friday  Drink plenty of fluids, rest  No work tomorrow

## 2023-02-21 NOTE — ED TRIAGE NOTES
Pt presents with 5 days hx of sore throat, bilateral ear pain, bilateral eye drainage with pink scleras.      Triage Assessment     Row Name 02/20/23 2044       Triage Assessment (Adult)    Airway WDL WDL       Respiratory WDL    Respiratory WDL WDL       Skin Circulation/Temperature WDL    Skin Circulation/Temperature WDL WDL       Cardiac WDL    Cardiac WDL WDL       Peripheral/Neurovascular WDL    Peripheral Neurovascular WDL WDL       Cognitive/Neuro/Behavioral WDL    Cognitive/Neuro/Behavioral WDL WDL

## 2023-02-21 NOTE — ED PROVIDER NOTES
History     Chief Complaint   Patient presents with     Pharyngitis     Eye Drainage     Otalgia     HPI  May Wade is a 52 year old female who presents to emergency department with 5-day history of sore throat, eye redness and purulent eye drainage and bilateral ear pain.  Patient reports associated symptoms of low-grade fevers.  Patient denies cough, chest pain, shortness of breath, difficulty breathing, nausea, vomiting, diarrhea, abdominal pain.  Patient denies any other concerns.      Allergies:  Allergies   Allergen Reactions     Pcn [Penicillins] Nausea and Vomiting     Aspartame Other (See Comments)     Severe pain, passing out     Benzoin Dermatitis     Tincture of benzoin - skin burn/rash        Problem List:    Patient Active Problem List    Diagnosis Date Noted     Type 2 diabetes mellitus with microalbuminuria, without long-term current use of insulin (H) 02/07/2018     Priority: Medium     overweight BMI >30 10/18/2015     Priority: Medium     Esophageal reflux 02/17/2014     Priority: Medium     HTN, goal below 140/90 12/09/2013     Priority: Medium     Hypertriglyceridemia 10/26/2010     Priority: Medium     Hyperlipidemia LDL goal <100 10/26/2010     Priority: Medium        Past Medical History:    Past Medical History:   Diagnosis Date     cervical dysplasia 1990     Chronic hip pain 10/25/2006     gestational diabetes      Migraines      Pure hypercholesterolemia        Past Surgical History:    Past Surgical History:   Procedure Laterality Date     COLONOSCOPY N/A 6/3/2019    Procedure: COLONOSCOPY, WITH POLYPECTOMY AND BIOPSY;  Surgeon: Live Cao MD;  Location: [facility]     COLONOSCOPY N/A 6/11/2021    Procedure: COLONOSCOPY, WITH POLYPECTOMY AND BIOPSY;  Surgeon: Billy Rivera DO;  Location: [facility]     CRYOTHERAPY, CERVICAL  1990     HC HYSTEROSCOPY, SURGICAL; W/ ENDOMETRIAL ABLATION, ANY METHOD  2/7/08    Novasure     SURGICAL HISTORY OF -   6/17/2004    Left wrist dorsal  "ganglion cyst excision-Removal of the recurrent dorsal boss.     SURGICAL HISTORY OF -       Right Ankle Surgery (Ganglion Cyst)     SURGICAL HISTORY OF -       Breast Reduction     SURGICAL HISTORY OF -       Abdominal Wall Revision     SURGICAL HISTORY OF -   2003    Left Ankle Cyst Removal     SURGICAL HISTORY OF -       Left ankle surgery for ganglion cyst,      SURGICAL HISTORY OF -       Left wrist bone and cyst surgery       Family History:    Family History   Problem Relation Age of Onset     C.A.D. Father          age 51     Respiratory Father         smoker     Cerebrovascular Disease Maternal Grandmother      Hypertension Maternal Grandmother      C.A.D. Paternal Grandfather      Asthma No family hx of      Diabetes No family hx of      Breast Cancer No family hx of      Cancer - colorectal No family hx of      Prostate Cancer No family hx of        Social History:  Marital Status:   [2]  Social History     Tobacco Use     Smoking status: Never     Smokeless tobacco: Never   Vaping Use     Vaping Use: Never used   Substance Use Topics     Alcohol use: Yes     Comment: 1 drink every 2 weeks     Drug use: No        Medications:    cephALEXin (KEFLEX) 500 MG capsule  ACCU-CHEK GUIDE test strip  blood glucose (NO BRAND SPECIFIED) lancets standard  Blood Glucose Monitoring Suppl (ACCU-CHEK GUIDE ME) w/Device KIT  fish oil-omega-3 fatty acids (OMEGA 3) 1000 MG capsule  JARDIANCE 25 MG TABS tablet  losartan (COZAAR) 25 MG tablet  metFORMIN (GLUCOPHAGE XR) 500 MG 24 hr tablet  omeprazole (PRILOSEC) 40 MG DR capsule  Semaglutide, 2 MG/DOSE, (OZEMPIC, 2 MG/DOSE,) 8 MG/3ML SOPN  simvastatin (ZOCOR) 40 MG tablet        Review of Systems  As mentioned above in the history present illness. All other systems were reviewed and are negative.    Physical Exam   BP: (!) 153/78  Pulse: 102  Temp: 98.2  F (36.8  C)  Resp: 16  Height: 147.3 cm (4' 10\")  Weight: 59 kg (130 lb)  SpO2: 98 %      Physical " Exam  Vitals and nursing note reviewed.   Constitutional:       General: She is not in acute distress.     Appearance: She is well-developed. She is ill-appearing. She is not toxic-appearing or diaphoretic.   HENT:      Head: Normocephalic and atraumatic.      Jaw: No trismus.      Right Ear: Hearing, tympanic membrane, ear canal and external ear normal.      Left Ear: Hearing, tympanic membrane, ear canal and external ear normal.      Nose: Congestion present. No mucosal edema or rhinorrhea.      Right Sinus: No maxillary sinus tenderness or frontal sinus tenderness.      Left Sinus: No maxillary sinus tenderness or frontal sinus tenderness.      Mouth/Throat:      Mouth: Mucous membranes are moist. No oral lesions.      Pharynx: Uvula midline. Posterior oropharyngeal erythema present. No pharyngeal swelling, oropharyngeal exudate or uvula swelling.      Tonsils: No tonsillar exudate or tonsillar abscesses. 0 on the right. 0 on the left.   Eyes:      General: No scleral icterus.        Right eye: No discharge.         Left eye: No discharge.      Conjunctiva/sclera:      Right eye: Right conjunctiva is injected. Exudate (purulent) present.      Left eye: Left conjunctiva is injected. Exudate (purulent) present.   Cardiovascular:      Rate and Rhythm: Normal rate and regular rhythm.      Heart sounds: Normal heart sounds.   Pulmonary:      Effort: Pulmonary effort is normal. No respiratory distress.      Breath sounds: Normal breath sounds. No stridor. No wheezing or rales.   Musculoskeletal:         General: No tenderness.      Cervical back: Normal range of motion and neck supple.   Lymphadenopathy:      Cervical: Cervical adenopathy present.   Skin:     General: Skin is warm.      Findings: No erythema or rash.   Neurological:      Mental Status: She is alert and oriented to person, place, and time.         ED Course                 Procedures    Results for orders placed or performed during the hospital  encounter of 02/20/23 (from the past 24 hour(s))   Streptococcus A Rapid Scr w Reflx to PCR    Specimen: Throat; Swab   Result Value Ref Range    Group A Strep antigen Positive (A) Negative       Medications - No data to display    Assessments & Plan (with Medical Decision Making)     I have reviewed the nursing notes.    I have reviewed the findings, diagnosis, plan and need for follow up with the patient.  52-year-old female presents with a 5-day history of sore throat, bilateral purulent eye drainage, lymphadenopathy, hoarse voice and low-grade fevers.  On exam there is no trismus, muffled voice, hot potato voice, exudative tonsillitis.  Quick strep is positive.  Bilateral purulent conjunctivitis likely associated with strep is noted.  No otitis media is noted.  We will treat with Keflex as patient is allergic to penicillin.  Discussed with patient care and treatment and recommend no work for 24 hours.  Patient verbalized understanding and is discharged in stable condition      Discharge Medication List as of 2/20/2023 10:57 PM      START taking these medications    Details   cephALEXin (KEFLEX) 500 MG capsule Take 1 capsule (500 mg) by mouth 2 times daily, Disp-20 capsule, R-0, [facility]             Final diagnoses:   Streptococcal pharyngitis       2/20/2023   [facility] EMERGENCY DEPT     Virginia Irizarry, ASTRID CNP  02/20/23 4547

## 2023-02-23 ENCOUNTER — E-VISIT (OUTPATIENT)
Dept: FAMILY MEDICINE | Facility: CLINIC | Age: 53
End: 2023-02-23
Payer: COMMERCIAL

## 2023-02-23 ENCOUNTER — VIRTUAL VISIT (OUTPATIENT)
Dept: FAMILY MEDICINE | Facility: CLINIC | Age: 53
End: 2023-02-23
Payer: COMMERCIAL

## 2023-02-23 ENCOUNTER — MYC MEDICAL ADVICE (OUTPATIENT)
Dept: FAMILY MEDICINE | Facility: CLINIC | Age: 53
End: 2023-02-23
Payer: COMMERCIAL

## 2023-02-23 DIAGNOSIS — J06.9 VIRAL URI: ICD-10-CM

## 2023-02-23 DIAGNOSIS — J02.0 STREPTOCOCCAL PHARYNGITIS: ICD-10-CM

## 2023-02-23 DIAGNOSIS — J02.0 STREPTOCOCCAL PHARYNGITIS: Primary | ICD-10-CM

## 2023-02-23 PROCEDURE — 99421 OL DIG E/M SVC 5-10 MIN: CPT | Performed by: FAMILY MEDICINE

## 2023-02-23 PROCEDURE — 99213 OFFICE O/P EST LOW 20 MIN: CPT | Mod: VID | Performed by: FAMILY MEDICINE

## 2023-02-23 RX ORDER — AZITHROMYCIN 250 MG/1
500 TABLET, FILM COATED ORAL DAILY
Qty: 6 TABLET | Refills: 0 | Status: SHIPPED | OUTPATIENT
Start: 2023-02-23 | End: 2023-02-26

## 2023-02-23 ASSESSMENT — ENCOUNTER SYMPTOMS
GASTROINTESTINAL NEGATIVE: 1
SORE THROAT: 1
FEVER: 0
TROUBLE SWALLOWING: 1
SINUS PAIN: 1
RHINORRHEA: 1

## 2023-02-23 NOTE — PROGRESS NOTES
"May is a 52 year old who is being evaluated via a billable video visit.      How would you like to obtain your AVS? MyChart  If the video visit is dropped, the invitation should be resent by: Text to cell phone: [phone]  Will anyone else be joining your video visit? No        Assessment and Plan    (J02.0) Streptococcal pharyngitis  (primary encounter diagnosis)  Comment: PCN allergy, will switch to azithromycin  Plan: azithromycin (ZITHROMAX) 250 MG tablet                RTC in 1w prn    Joel Cruz MD      Subjective   May is a 52 year old, presenting for the following health issues:  Sick and Video Visit      History of Present Illness       Reason for visit:  Strep & sinus infection & both eyes/pink eye.  Not sure i was given the proper meds at urgent care on monday    She eats 4 or more servings of fruits and vegetables daily.She consumes 0 sweetened beverage(s) daily. She exercises with enough effort to increase her heart rate 4 days per week.   She is taking medications regularly.       ED/UC Followup:    Facility:  [facility]  Date of visit: 2/20/23  Reason for visit: Strep  Current Status: She had a positive strep test at ED   She was given Keflex, she has taken 6 pills so already and feels worse than she did before. She states her throat is \"disgusiting\" and so sore, she has a dry painful cough and miserale    She has also tried Dayquil and mucinex and benadryl which have helped more than the keflex    Has taken several days of Keflex not feeling any better.  Significantly sore throat.  Started feeling sick one week ago today.          Review of Systems   Constitutional: Negative for fever.   HENT: Positive for rhinorrhea, sinus pain, sore throat and trouble swallowing.    Gastrointestinal: Negative.             Objective           Vitals:  No vitals were obtained today due to virtual visit.    Physical Exam   GENERAL: Healthy, alert and no distress  EYES: Eyes grossly normal to inspection.  " No discharge or erythema, or obvious scleral/conjunctival abnormalities.  RESP: No audible wheeze, cough, or visible cyanosis.  No visible retractions or increased work of breathing.    SKIN: Visible skin clear. No significant rash, abnormal pigmentation or lesions.  NEURO: Cranial nerves grossly intact.  Mentation and speech appropriate for age.  PSYCH: Mentation appears normal, affect normal/bright, judgement and insight intact, normal speech and appearance well-groomed.                Video-Visit Details    Type of service:  Video Visit     Originating Location (pt. Location): Home  Distant Location (provider location):  Off-site  Platform used for Video Visit: [employer]

## 2023-04-14 ENCOUNTER — LAB (OUTPATIENT)
Dept: LAB | Facility: CLINIC | Age: 53
End: 2023-04-14
Payer: COMMERCIAL

## 2023-04-14 DIAGNOSIS — E11.29 TYPE 2 DIABETES MELLITUS WITH MICROALBUMINURIA, WITHOUT LONG-TERM CURRENT USE OF INSULIN (H): ICD-10-CM

## 2023-04-14 DIAGNOSIS — R80.9 TYPE 2 DIABETES MELLITUS WITH MICROALBUMINURIA, WITHOUT LONG-TERM CURRENT USE OF INSULIN (H): ICD-10-CM

## 2023-04-14 LAB — HBA1C MFR BLD: 7.5 % (ref 0–5.6)

## 2023-04-14 PROCEDURE — 36415 COLL VENOUS BLD VENIPUNCTURE: CPT

## 2023-04-14 PROCEDURE — 83036 HEMOGLOBIN GLYCOSYLATED A1C: CPT

## 2023-04-20 ENCOUNTER — PATIENT OUTREACH (OUTPATIENT)
Dept: CARE COORDINATION | Facility: CLINIC | Age: 53
End: 2023-04-20
Payer: COMMERCIAL

## 2023-05-07 ENCOUNTER — TELEPHONE (OUTPATIENT)
Dept: FAMILY MEDICINE | Facility: CLINIC | Age: 53
End: 2023-05-07
Payer: COMMERCIAL

## 2023-05-07 DIAGNOSIS — R80.9 TYPE 2 DIABETES MELLITUS WITH MICROALBUMINURIA, WITHOUT LONG-TERM CURRENT USE OF INSULIN (H): ICD-10-CM

## 2023-05-07 DIAGNOSIS — E11.29 TYPE 2 DIABETES MELLITUS WITH MICROALBUMINURIA, WITHOUT LONG-TERM CURRENT USE OF INSULIN (H): ICD-10-CM

## 2023-05-08 RX ORDER — EMPAGLIFLOZIN 25 MG/1
TABLET, FILM COATED ORAL
Qty: 30 TABLET | Refills: 0 | Status: SHIPPED | OUTPATIENT
Start: 2023-05-08 | End: 2023-05-25

## 2023-05-08 NOTE — TELEPHONE ENCOUNTER
"May is due for a diabetic follow-up. Thank you!    Yoselyn Bravo, RN      Requested Prescriptions   Pending Prescriptions Disp Refills     JARDIANCE 25 MG TABS tablet [Pharmacy Med Name: JARDIANCE 25MG TABS] 90 tablet 1     Sig: TAKE 1 TABLET BY MOUTH ONCE DAILY       Sodium Glucose Co-Transport Inhibitor Agents Failed - 5/7/2023  9:24 PM        Failed - No creatinine >1.4 or GFR <45 within the past 12 mos     Recent Labs   Lab Test 04/21/22  1549 03/19/21  1042   GFRESTIMATED >90 >90   GFRESTBLACK  --  >90       Recent Labs   Lab Test 04/21/22  1549   CR 0.71             Failed - Patient has documented normal Potassium within the last 12 mos.     Recent Labs   Lab Test 04/21/22  1549   POTASSIUM 4.2             Passed - Patient has documented A1c within the specified period of time.     If HgbA1C is 8 or greater, it needs to be on file within the past 3 months.  If less than 8, must be on file within the past 6 months.     Recent Labs   Lab Test 04/14/23  1314   A1C 7.5*             Passed - Medication is active on med list        Passed - Patient is age 18 or older        Passed - Patient is not pregnant        Passed - Patient has no positive pregnancy test within the past 12 mos.        Passed - Recent (6 mo) or future (30 days) visit within the authorizing provider's specialty     Patient had office visit in the last 6 months or has a visit in the next 30 days with authorizing provider or within the authorizing provider's specialty.  See \"Patient Info\" tab in inbasket, or \"Choose Columns\" in Meds & Orders section of the refill encounter.                 "

## 2023-05-10 NOTE — RESULT ENCOUNTER NOTE
Please notify pt of normal/acceptable results.    Julia Reyna M.D.     Rhofade Pregnancy And Lactation Text: This medication has not been assigned a Pregnancy Risk Category. It is unknown if the medication is excreted in breast milk.

## 2023-05-23 ENCOUNTER — PATIENT OUTREACH (OUTPATIENT)
Dept: CARE COORDINATION | Facility: CLINIC | Age: 53
End: 2023-05-23
Payer: COMMERCIAL

## 2023-05-25 ENCOUNTER — OFFICE VISIT (OUTPATIENT)
Dept: FAMILY MEDICINE | Facility: CLINIC | Age: 53
End: 2023-05-25
Payer: COMMERCIAL

## 2023-05-25 VITALS
DIASTOLIC BLOOD PRESSURE: 74 MMHG | WEIGHT: 134 LBS | HEART RATE: 95 BPM | RESPIRATION RATE: 16 BRPM | HEIGHT: 58 IN | TEMPERATURE: 97.4 F | SYSTOLIC BLOOD PRESSURE: 116 MMHG | OXYGEN SATURATION: 96 % | BODY MASS INDEX: 28.13 KG/M2

## 2023-05-25 DIAGNOSIS — K21.9 GASTROESOPHAGEAL REFLUX DISEASE WITHOUT ESOPHAGITIS: ICD-10-CM

## 2023-05-25 DIAGNOSIS — E78.5 HYPERLIPIDEMIA LDL GOAL <100: ICD-10-CM

## 2023-05-25 DIAGNOSIS — E11.29 TYPE 2 DIABETES MELLITUS WITH MICROALBUMINURIA, WITHOUT LONG-TERM CURRENT USE OF INSULIN (H): Primary | ICD-10-CM

## 2023-05-25 DIAGNOSIS — Z12.31 VISIT FOR SCREENING MAMMOGRAM: ICD-10-CM

## 2023-05-25 DIAGNOSIS — R80.9 TYPE 2 DIABETES MELLITUS WITH MICROALBUMINURIA, WITHOUT LONG-TERM CURRENT USE OF INSULIN (H): Primary | ICD-10-CM

## 2023-05-25 DIAGNOSIS — I10 HTN, GOAL BELOW 140/90: ICD-10-CM

## 2023-05-25 LAB
ANION GAP SERPL CALCULATED.3IONS-SCNC: 12 MMOL/L (ref 7–15)
BUN SERPL-MCNC: 15.4 MG/DL (ref 6–20)
CALCIUM SERPL-MCNC: 10 MG/DL (ref 8.6–10)
CHLORIDE SERPL-SCNC: 100 MMOL/L (ref 98–107)
CHOLEST SERPL-MCNC: 150 MG/DL
CREAT SERPL-MCNC: 0.69 MG/DL (ref 0.51–0.95)
CREAT UR-MCNC: 36.1 MG/DL
DEPRECATED HCO3 PLAS-SCNC: 26 MMOL/L (ref 22–29)
GFR SERPL CREATININE-BSD FRML MDRD: >90 ML/MIN/1.73M2
GLUCOSE SERPL-MCNC: 219 MG/DL (ref 70–99)
HDLC SERPL-MCNC: 43 MG/DL
LDLC SERPL CALC-MCNC: 39 MG/DL
MICROALBUMIN UR-MCNC: <12 MG/L
MICROALBUMIN/CREAT UR: NORMAL MG/G{CREAT}
NONHDLC SERPL-MCNC: 107 MG/DL
POTASSIUM SERPL-SCNC: 4 MMOL/L (ref 3.4–5.3)
SODIUM SERPL-SCNC: 138 MMOL/L (ref 136–145)
TRIGL SERPL-MCNC: 341 MG/DL

## 2023-05-25 PROCEDURE — 80061 LIPID PANEL: CPT | Performed by: FAMILY MEDICINE

## 2023-05-25 PROCEDURE — 82570 ASSAY OF URINE CREATININE: CPT | Performed by: FAMILY MEDICINE

## 2023-05-25 PROCEDURE — 99207 PR FOOT EXAM NO CHARGE: CPT | Performed by: FAMILY MEDICINE

## 2023-05-25 PROCEDURE — 36415 COLL VENOUS BLD VENIPUNCTURE: CPT | Performed by: FAMILY MEDICINE

## 2023-05-25 PROCEDURE — 99214 OFFICE O/P EST MOD 30 MIN: CPT | Mod: 25 | Performed by: FAMILY MEDICINE

## 2023-05-25 PROCEDURE — 82043 UR ALBUMIN QUANTITATIVE: CPT | Performed by: FAMILY MEDICINE

## 2023-05-25 PROCEDURE — 90715 TDAP VACCINE 7 YRS/> IM: CPT | Performed by: FAMILY MEDICINE

## 2023-05-25 PROCEDURE — 80048 BASIC METABOLIC PNL TOTAL CA: CPT | Performed by: FAMILY MEDICINE

## 2023-05-25 PROCEDURE — 90471 IMMUNIZATION ADMIN: CPT | Performed by: FAMILY MEDICINE

## 2023-05-25 RX ORDER — LOSARTAN POTASSIUM 25 MG/1
25 TABLET ORAL DAILY
Qty: 90 TABLET | Refills: 3 | Status: SHIPPED | OUTPATIENT
Start: 2023-05-25 | End: 2024-02-05

## 2023-05-25 RX ORDER — METFORMIN HCL 500 MG
TABLET, EXTENDED RELEASE 24 HR ORAL
Qty: 360 TABLET | Refills: 1 | Status: SHIPPED | OUTPATIENT
Start: 2023-05-25 | End: 2024-02-05

## 2023-05-25 RX ORDER — OMEPRAZOLE 40 MG/1
40 CAPSULE, DELAYED RELEASE ORAL DAILY
Qty: 90 CAPSULE | Refills: 3 | Status: SHIPPED | OUTPATIENT
Start: 2023-05-25 | End: 2024-02-05

## 2023-05-25 RX ORDER — SIMVASTATIN 40 MG
TABLET ORAL
Qty: 90 TABLET | Refills: 0 | Status: SHIPPED | OUTPATIENT
Start: 2023-05-25 | End: 2023-10-13

## 2023-05-25 RX ORDER — SEMAGLUTIDE 2.68 MG/ML
2 INJECTION, SOLUTION SUBCUTANEOUS WEEKLY
Qty: 9 ML | Refills: 1 | Status: SHIPPED | OUTPATIENT
Start: 2023-05-25 | End: 2023-12-05

## 2023-05-25 RX ORDER — BLOOD SUGAR DIAGNOSTIC
STRIP MISCELLANEOUS
Qty: 100 STRIP | Refills: 4 | Status: SHIPPED | OUTPATIENT
Start: 2023-05-25

## 2023-05-25 ASSESSMENT — PAIN SCALES - GENERAL: PAINLEVEL: NO PAIN (0)

## 2023-05-25 NOTE — PROGRESS NOTES
"  Assessment & Plan     Type 2 diabetes mellitus with microalbuminuria, without long-term current use of insulin (H)  Fair control  Last HgbA1c 7.5% - patient wants to continue current dose of medications and work on diet/exercise.    - Lipid panel reflex to direct LDL Non-fasting; Future  - Albumin Random Urine Quantitative with Creat Ratio; Future  - FOOT EXAM  - Semaglutide, 2 MG/DOSE, (OZEMPIC, 2 MG/DOSE,) 8 MG/3ML pen; Inject 2 mg Subcutaneous once a week  - metFORMIN (GLUCOPHAGE XR) 500 MG 24 hr tablet; TAKE TWO TABLETS BY MOUTH TWICE A DAY WITH MEALS.  - losartan (COZAAR) 25 MG tablet; Take 1 tablet (25 mg) by mouth daily  - empagliflozin (JARDIANCE) 25 MG TABS tablet; Take 1 tablet (25 mg) by mouth daily  - ACCU-CHEK GUIDE test strip; Use to test blood sugar 2 times daily or as directed.  - blood glucose (NO BRAND SPECIFIED) lancets standard; Use to test blood sugar 2 times daily or as directed.    HTN, goal below 140/90  Well controlled  - BASIC METABOLIC PANEL; Future  - losartan (COZAAR) 25 MG tablet; Take 1 tablet (25 mg) by mouth daily    Hyperlipidemia LDL goal <100   check lipids  - simvastatin (ZOCOR) 40 MG tablet; TAKE ONE TABLET BY MOUTH EVERY NIGHT AT BEDTIME    Gastroesophageal reflux disease without esophagitis     - omeprazole (PRILOSEC) 40 MG DR capsule; Take 1 capsule (40 mg) by mouth daily    Visit for screening mammogram                  BMI:   Estimated body mass index is 28.01 kg/m  as calculated from the following:    Height as of this encounter: 1.473 m (4' 10\").    Weight as of this encounter: 60.8 kg (134 lb).           Julia Reyna MD  [facility]    Rey Olvera is a 52 year old, presenting for the following health issues:  Diabetes    Diabetes Follow-up  Jardiance 25mg every day, metformin 1,000mg bid, ozempic 2mg SubQ weekly    History of Present Illness       Diabetes:   She presents for follow up of diabetes.  She is checking home blood " glucose a few times a month. She checks blood glucose before meals.  Blood glucose is never over 200 and never under 70. She is aware of hypoglycemia symptoms including shakiness, dizziness and lethargy. She has no concerns regarding her diabetes at this time.  She is not experiencing numbness or burning in feet, excessive thirst, blurry vision, weight changes or redness, sores or blisters on feet. The patient has not had a diabetic eye exam in the last 12 months.         She eats 4 or more servings of fruits and vegetables daily.She consumes 0 sweetened beverage(s) daily.She exercises with enough effort to increase her heart rate 20 to 29 minutes per day.  She exercises with enough effort to increase her heart rate 5 days per week.   She is taking medications regularly.         Hyperlipidemia Follow-Up  Simvastatin 40mg qhs    Are you regularly taking any medication or supplement to lower your cholesterol?   Yes- statin    Are you having muscle aches or other side effects that you think could be caused by your cholesterol lowering medication?  No    Hypertension Follow-up  Losartan 25mg qd    Do you check your blood pressure regularly outside of the clinic? No     Are you following a low salt diet? No    Are your blood pressures ever more than 140 on the top number (systolic) OR more   than 90 on the bottom number (diastolic), for example 140/90? N/A    BP Readings from Last 2 Encounters:   05/25/23 116/74   02/20/23 (!) 153/78     Hemoglobin A1C (%)   Date Value   04/14/2023 7.5 (H)   11/17/2022 8.0 (H)   03/19/2021 6.6 (H)   08/26/2020 6.6 (H)     LDL Cholesterol Calculated   Date Value   04/21/2022      Comment:     Cannot estimate LDL when triglyceride exceeds 400 mg/dL   03/19/2021 42 mg/dL   02/14/2020 38 mg/dL     LDL Cholesterol Direct (mg/dL)   Date Value   04/21/2022 80           Review of Systems   Constitutional, HEENT, cardiovascular, pulmonary, gi and gu systems are negative, except as otherwise  "noted.      Objective    /74   Pulse 95   Temp 97.4  F (36.3  C) (Tympanic)   Resp 16   Ht 1.473 m (4' 10\")   Wt 60.8 kg (134 lb)   LMP  (LMP Unknown)   SpO2 96%   BMI 28.01 kg/m    Body mass index is 28.01 kg/m .  Physical Exam   GENERAL: healthy, alert and no distress  NECK: no adenopathy, no asymmetry, masses, or scars and thyroid normal to palpation  RESP: lungs clear to auscultation - no rales, rhonchi or wheezes  CV: regular rate and rhythm, normal S1 S2, no S3 or S4, no murmur, click or rub, no peripheral edema and peripheral pulses strong  ABDOMEN: soft, nontender, no hepatosplenomegaly, no masses and bowel sounds normal  MS: no gross musculoskeletal defects noted, no edema    Diabetic Foot Screen:  Any complaints of increased pain or numbness ? No  Is there a foot ulcer now or a history of foot ulcer? No  Does the foot have an abnormal shape? No  Are the nails thick, too long or ingrown? No  Are there any redness or open areas? No         Sensation Testing done at all points on the diagram with monofilament     Right Foot: Sensation Normal at all points  Left Foot: Sensation Normal at all points     Risk Category: 0- No loss of protective sensation  Performed by Julia Reyna MD                      "

## 2023-06-01 ENCOUNTER — HEALTH MAINTENANCE LETTER (OUTPATIENT)
Age: 53
End: 2023-06-01

## 2023-08-27 ENCOUNTER — HEALTH MAINTENANCE LETTER (OUTPATIENT)
Age: 53
End: 2023-08-27

## 2023-09-11 ENCOUNTER — ANCILLARY PROCEDURE (OUTPATIENT)
Dept: MAMMOGRAPHY | Facility: CLINIC | Age: 53
End: 2023-09-11
Attending: FAMILY MEDICINE
Payer: COMMERCIAL

## 2023-09-11 DIAGNOSIS — Z12.31 VISIT FOR SCREENING MAMMOGRAM: ICD-10-CM

## 2023-09-11 PROCEDURE — 77063 BREAST TOMOSYNTHESIS BI: CPT | Mod: TC | Performed by: RADIOLOGY

## 2023-09-11 PROCEDURE — 77067 SCR MAMMO BI INCL CAD: CPT | Mod: TC | Performed by: RADIOLOGY

## 2023-10-12 DIAGNOSIS — E78.5 HYPERLIPIDEMIA LDL GOAL <100: ICD-10-CM

## 2023-10-13 RX ORDER — SIMVASTATIN 40 MG
TABLET ORAL
Qty: 90 TABLET | Refills: 0 | Status: SHIPPED | OUTPATIENT
Start: 2023-10-13 | End: 2024-01-10

## 2023-11-05 ENCOUNTER — HEALTH MAINTENANCE LETTER (OUTPATIENT)
Age: 53
End: 2023-11-05

## 2023-12-04 DIAGNOSIS — R80.9 TYPE 2 DIABETES MELLITUS WITH MICROALBUMINURIA, WITHOUT LONG-TERM CURRENT USE OF INSULIN (H): ICD-10-CM

## 2023-12-04 DIAGNOSIS — E11.29 TYPE 2 DIABETES MELLITUS WITH MICROALBUMINURIA, WITHOUT LONG-TERM CURRENT USE OF INSULIN (H): ICD-10-CM

## 2023-12-05 RX ORDER — EMPAGLIFLOZIN 25 MG/1
25 TABLET, FILM COATED ORAL DAILY
Qty: 30 TABLET | Refills: 0 | Status: SHIPPED | OUTPATIENT
Start: 2023-12-05 | End: 2024-01-10

## 2023-12-05 RX ORDER — SEMAGLUTIDE 2.68 MG/ML
INJECTION, SOLUTION SUBCUTANEOUS
Qty: 3 ML | Refills: 0 | Status: SHIPPED | OUTPATIENT
Start: 2023-12-05 | End: 2024-01-04

## 2023-12-12 ENCOUNTER — TRANSFERRED RECORDS (OUTPATIENT)
Dept: MULTI SPECIALTY CLINIC | Facility: CLINIC | Age: 53
End: 2023-12-12

## 2023-12-12 LAB — RETINOPATHY: NORMAL

## 2024-01-02 ENCOUNTER — MYC MEDICAL ADVICE (OUTPATIENT)
Dept: FAMILY MEDICINE | Facility: CLINIC | Age: 54
End: 2024-01-02
Payer: COMMERCIAL

## 2024-01-02 DIAGNOSIS — R80.9 TYPE 2 DIABETES MELLITUS WITH MICROALBUMINURIA, WITHOUT LONG-TERM CURRENT USE OF INSULIN (H): Primary | ICD-10-CM

## 2024-01-02 DIAGNOSIS — E11.29 TYPE 2 DIABETES MELLITUS WITH MICROALBUMINURIA, WITHOUT LONG-TERM CURRENT USE OF INSULIN (H): Primary | ICD-10-CM

## 2024-01-04 RX ORDER — SEMAGLUTIDE 2.68 MG/ML
INJECTION, SOLUTION SUBCUTANEOUS
Qty: 3 ML | Refills: 0 | Status: SHIPPED | OUTPATIENT
Start: 2024-01-04 | End: 2024-02-05

## 2024-01-04 NOTE — TELEPHONE ENCOUNTER
"Prescription approved per [facility] Refill Protocol.    Note patient has scheduled appointment and A1C is ordered.     Pending Prescriptions:                       Disp   Refills    Semaglutide (2 MG/DOSE) (OZEMPIC (2 MG/DO*3 mL   0            Sig: INJECT 2 MG UNDER THE SKIN ONCE A WEEK      Requested Prescriptions   Pending Prescriptions Disp Refills    Semaglutide (2 MG/DOSE) (OZEMPIC (2 MG/DOSE)) 8 MG/3ML pen 3 mL 0     Sig: INJECT 2 MG UNDER THE SKIN ONCE A WEEK       GLP-1 Agonists Protocol Failed - 1/4/2024  8:18 AM        Failed - HgbA1C in past 3 or 6 months     If HgbA1C is 8 or greater, it needs to be on file within the past 3 months.  If less than 8, must be on file within the past 6 months.     Recent Labs   Lab Test 04/14/23  1314   A1C 7.5*             Failed - Recent (6 mo) or future (30 days) visit within the authorizing provider's specialty     Patient had office visit in the last 6 months or has a visit in the next 30 days with authorizing provider.  See \"Patient Info\" tab in inbasket, or \"Choose Columns\" in Meds & Orders section of the refill encounter.            Passed - Medication is active on med list        Passed - Patient is age 18 or older        Passed - No active pregnancy on record        Passed - Normal serum creatinine on file in past 12 months     Recent Labs   Lab Test 05/25/23  1424   CR 0.69       Ok to refill medication if creatinine is low          Passed - No positive pregnancy test in past 12 months           Ethel Bhatti RN on 1/4/2024 at 8:20 AM    "

## 2024-01-05 ENCOUNTER — TELEPHONE (OUTPATIENT)
Dept: FAMILY MEDICINE | Facility: CLINIC | Age: 54
End: 2024-01-05
Payer: COMMERCIAL

## 2024-01-05 DIAGNOSIS — R80.9 TYPE 2 DIABETES MELLITUS WITH MICROALBUMINURIA, WITHOUT LONG-TERM CURRENT USE OF INSULIN (H): ICD-10-CM

## 2024-01-05 DIAGNOSIS — E11.29 TYPE 2 DIABETES MELLITUS WITH MICROALBUMINURIA, WITHOUT LONG-TERM CURRENT USE OF INSULIN (H): ICD-10-CM

## 2024-01-05 RX ORDER — SEMAGLUTIDE 2.68 MG/ML
INJECTION, SOLUTION SUBCUTANEOUS
Qty: 3 ML | Refills: 0 | Status: CANCELLED | OUTPATIENT
Start: 2024-01-05

## 2024-01-05 NOTE — TELEPHONE ENCOUNTER
Prior Authorization Retail Medication Request    Medication/Dose: Ozempic 8mg/3ml  Diagnosis and ICD code (if different than what is on RX):  na  New/renewal/insurance change PA/secondary ins. PA:  Previously Tried and Failed:  na  Rationale:  na    Insurance   Primary: [employer]  Insurance ID:  [id number]    Secondary (if applicable):alvin  Insurance ID:  alvin    Pharmacy Information (if different than what is on RX)  Name:  [facility]   Phone:  [phone]  Fax:[phone]

## 2024-01-09 ENCOUNTER — E-VISIT (OUTPATIENT)
Dept: URGENT CARE | Facility: CLINIC | Age: 54
End: 2024-01-09
Payer: COMMERCIAL

## 2024-01-09 DIAGNOSIS — N39.0 ACUTE UTI (URINARY TRACT INFECTION): ICD-10-CM

## 2024-01-09 DIAGNOSIS — N30.00 ACUTE CYSTITIS WITHOUT HEMATURIA: Primary | ICD-10-CM

## 2024-01-09 PROCEDURE — 99421 OL DIG E/M SVC 5-10 MIN: CPT | Performed by: EMERGENCY MEDICINE

## 2024-01-09 RX ORDER — NITROFURANTOIN 25; 75 MG/1; MG/1
100 CAPSULE ORAL 2 TIMES DAILY
Qty: 10 CAPSULE | Refills: 0 | Status: SHIPPED | OUTPATIENT
Start: 2024-01-09 | End: 2024-01-14

## 2024-01-09 RX ORDER — PHENAZOPYRIDINE HYDROCHLORIDE 200 MG/1
200 TABLET, FILM COATED ORAL 3 TIMES DAILY PRN
Qty: 6 TABLET | Refills: 0 | Status: SHIPPED | OUTPATIENT
Start: 2024-01-09 | End: 2024-02-05

## 2024-01-09 NOTE — PATIENT INSTRUCTIONS
Dear May Wade    After reviewing your responses, I've been able to diagnose you with a urinary tract infection, which is a common infection of the bladder with bacteria.  This is not a sexually transmitted infection, though urinating immediately after intercourse can help prevent infections.  Drinking lots of fluids is also helpful to clear your current infection and prevent the next one.      I have sent a prescription for antibiotics to your pharmacy to treat this infection.    It is important that you take all of your prescribed medication even if your symptoms are improving after a few doses.  Taking all of your medicine helps prevent the symptoms from returning.     If your symptoms worsen, you develop pain in your back or stomach, develop fevers, or are not improving in 5 days, please contact your primary care provider for an appointment or visit any of our convenient Walk-in or Urgent Care Centers to be seen, which can be found on our website here.    Thanks again for choosing us as your health care partner,    Kush Mosquera MD  Urinary Tract Infection (UTI) in Women: Care Instructions  Overview     A urinary tract infection, or UTI, is a general term for an infection anywhere between the kidneys and the urethra (where urine comes out). Most UTIs are bladder infections. They often cause pain or burning when you urinate.  UTIs are caused by bacteria and can be cured with antibiotics. Be sure to complete your treatment so that the infection does not get worse.  Follow-up care is a key part of your treatment and safety. Be sure to make and go to all appointments, and call your doctor if you are having problems. It's also a good idea to know your test results and keep a list of the medicines you take.  How can you care for yourself at home?    Take your antibiotics as directed. Do not stop taking them just because you feel better. You need to take the full course of antibiotics.    Drink extra water  "and other fluids for the next day or two. This will help make the urine less concentrated and help wash out the bacteria that are causing the infection. (If you have kidney, heart, or liver disease and have to limit fluids, talk with your doctor before you increase the amount of fluids you drink.)    Avoid drinks that are carbonated or have caffeine. They can irritate the bladder.    Urinate often. Try to empty your bladder each time.    To relieve pain, take a hot bath or lay a heating pad set on low over your lower belly or genital area. Never go to sleep with a heating pad in place.  To prevent UTIs    Drink plenty of water each day. This helps you urinate often, which clears bacteria from your system. (If you have kidney, heart, or liver disease and have to limit fluids, talk with your doctor before you increase the amount of fluids you drink.)    Urinate when you need to.    If you are sexually active, urinate right after you have sex.    Change sanitary pads often.    Avoid douches, bubble baths, feminine hygiene sprays, and other feminine hygiene products that have deodorants.    After going to the bathroom, wipe from front to back.  When should you call for help?   Call your doctor now or seek immediate medical care if:      Symptoms such as fever, chills, nausea, or vomiting get worse or appear for the first time.       You have new pain in your back just below your rib cage. This is called flank pain.       There is new blood or pus in your urine.       You have any problems with your antibiotic medicine.   Watch closely for changes in your health, and be sure to contact your doctor if:      You are not getting better after taking an antibiotic for 2 days.       Your symptoms go away but then come back.   Where can you learn more?  Go to https://www.healthwise.net/patiented  Enter K848 in the search box to learn more about \"Urinary Tract Infection (UTI) in Women: Care Instructions.\"  Current as of: " February 28, 2023               Content Version: 13.8    0802-9036 [employer].   Care instructions adapted under license by your healthcare professional. If you have questions about a medical condition or this instruction, always ask your healthcare professional. [employer] disclaims any warranty or liability for your use of this information.

## 2024-01-10 DIAGNOSIS — E11.29 TYPE 2 DIABETES MELLITUS WITH MICROALBUMINURIA, WITHOUT LONG-TERM CURRENT USE OF INSULIN (H): ICD-10-CM

## 2024-01-10 DIAGNOSIS — R80.9 TYPE 2 DIABETES MELLITUS WITH MICROALBUMINURIA, WITHOUT LONG-TERM CURRENT USE OF INSULIN (H): ICD-10-CM

## 2024-01-10 DIAGNOSIS — E78.5 HYPERLIPIDEMIA LDL GOAL <100: ICD-10-CM

## 2024-01-10 RX ORDER — SIMVASTATIN 40 MG
TABLET ORAL
Qty: 90 TABLET | Refills: 0 | Status: SHIPPED | OUTPATIENT
Start: 2024-01-10 | End: 2024-02-05

## 2024-01-10 RX ORDER — EMPAGLIFLOZIN 25 MG/1
25 TABLET, FILM COATED ORAL DAILY
Qty: 30 TABLET | Refills: 0 | Status: SHIPPED | OUTPATIENT
Start: 2024-01-10 | End: 2024-02-05

## 2024-01-10 NOTE — TELEPHONE ENCOUNTER
"Has appointment scheduled for 2/5/24      Requested Prescriptions   Pending Prescriptions Disp Refills    JARDIANCE 25 MG TABS tablet [Pharmacy Med Name: JARDIANCE 25MG TABS] 30 tablet 0     Sig: TAKE ONE TABLET BY MOUTH ONCE DAILY       Sodium Glucose Co-Transport Inhibitor Agents Failed - 1/10/2024 10:08 AM        Failed - Patient has documented A1c within the specified period of time.     If HgbA1C is 8 or greater, it needs to be on file within the past 3 months.  If less than 8, must be on file within the past 6 months.     Recent Labs   Lab Test 04/14/23  1314   A1C 7.5*             Passed - No creatinine >1.4 or GFR <45 within the past 12 mos     Recent Labs   Lab Test 05/25/23  1424 04/21/22  1549 03/19/21  1042   GFRESTIMATED >90   < > >90   GFRESTBLACK  --   --  >90    < > = values in this interval not displayed.       Recent Labs   Lab Test 05/25/23  1424   CR 0.69             Passed - Medication is active on med list        Passed - Patient is age 18 or older        Passed - Patient is not pregnant        Passed - Patient has documented normal Potassium within the last 12 mos.     Recent Labs   Lab Test 05/25/23  1424   POTASSIUM 4.0             Passed - Patient has no positive pregnancy test within the past 12 mos.        Passed - Recent (6 mo) or future (30 days) visit within the authorizing provider's specialty     Patient had office visit in the last 6 months or has a visit in the next 30 days with authorizing provider or within the authorizing provider's specialty.  See \"Patient Info\" tab in inbasket, or \"Choose Columns\" in Meds & Orders section of the refill encounter.              simvastatin (ZOCOR) 40 MG tablet [Pharmacy Med Name: SIMVASTATIN 40MG TABS] 90 tablet 0     Sig: TAKE ONE TABLET BY MOUTH EVERY NIGHT AT BEDTIME       Statins Protocol Passed - 1/10/2024 10:08 AM        Passed - LDL on file in past 12 months     Recent Labs   Lab Test 05/25/23  1424   LDL 39             Passed - No " abnormal creatine kinase in past 12 months     No lab results found.             Passed - Recent (12 mo) or future (30 days) visit within the authorizing provider's specialty     The patient must have completed an in-person or virtual visit within the past 12 months or has a future visit scheduled within the next 90 days with the authorizing provider s specialty.  Urgent care and e-visits do not quality as an office visit for this protocol.          Passed - Medication is active on med list        Passed - Patient is age 18 or older        Passed - No active pregnancy on record        Passed - No positive pregnancy test in past 12 months

## 2024-01-10 NOTE — TELEPHONE ENCOUNTER
Prior Authorization Approval    Medication: OZEMPIC (1 MG/DOSE) 4 MG/3ML SC SOPN  Authorization Effective Date: 12/11/2023  Authorization Expiration Date: 1/9/2025  Approved Dose/Quantity: 3MLS PER 28 DAYS  Insurance Company: [employer] - Phone [phone] Fax [phone]  Which Pharmacy is filling the prescription: [facility] [place], [place] - [address]  Pharmacy Notified: YES    Patient Notified: YES **Instructed pharmacy to notify patient when script is ready to /ship.**

## 2024-01-10 NOTE — TELEPHONE ENCOUNTER
Retail Pharmacy Prior Authorization Team   Phone: [phone]    PA Initiation    Medication: OZEMPIC (1 MG/DOSE) 4 MG/3ML SC SOPN  Insurance Company: [employer] - Phone [phone] Fax [phone]  Pharmacy Filling the Rx: [facility] [place], [place] - [address]  Filling Pharmacy Phone: [phone]  Filling Pharmacy Fax:    Start Date: 1/10/2024

## 2024-02-05 ENCOUNTER — OFFICE VISIT (OUTPATIENT)
Dept: FAMILY MEDICINE | Facility: CLINIC | Age: 54
End: 2024-02-05
Attending: FAMILY MEDICINE
Payer: COMMERCIAL

## 2024-02-05 ENCOUNTER — LAB (OUTPATIENT)
Dept: LAB | Facility: CLINIC | Age: 54
End: 2024-02-05
Payer: COMMERCIAL

## 2024-02-05 VITALS
DIASTOLIC BLOOD PRESSURE: 72 MMHG | WEIGHT: 137 LBS | SYSTOLIC BLOOD PRESSURE: 112 MMHG | OXYGEN SATURATION: 99 % | BODY MASS INDEX: 28.76 KG/M2 | HEART RATE: 100 BPM | RESPIRATION RATE: 18 BRPM | TEMPERATURE: 97.6 F | HEIGHT: 58 IN

## 2024-02-05 DIAGNOSIS — R80.9 TYPE 2 DIABETES MELLITUS WITH MICROALBUMINURIA, WITHOUT LONG-TERM CURRENT USE OF INSULIN (H): ICD-10-CM

## 2024-02-05 DIAGNOSIS — E11.29 TYPE 2 DIABETES MELLITUS WITH MICROALBUMINURIA, WITHOUT LONG-TERM CURRENT USE OF INSULIN (H): ICD-10-CM

## 2024-02-05 DIAGNOSIS — I10 HTN, GOAL BELOW 140/90: ICD-10-CM

## 2024-02-05 DIAGNOSIS — R30.0 DYSURIA: ICD-10-CM

## 2024-02-05 DIAGNOSIS — R80.9 TYPE 2 DIABETES MELLITUS WITH MICROALBUMINURIA, WITHOUT LONG-TERM CURRENT USE OF INSULIN (H): Primary | ICD-10-CM

## 2024-02-05 DIAGNOSIS — K21.9 GASTROESOPHAGEAL REFLUX DISEASE WITHOUT ESOPHAGITIS: ICD-10-CM

## 2024-02-05 DIAGNOSIS — E11.29 TYPE 2 DIABETES MELLITUS WITH MICROALBUMINURIA, WITHOUT LONG-TERM CURRENT USE OF INSULIN (H): Primary | ICD-10-CM

## 2024-02-05 DIAGNOSIS — E78.5 HYPERLIPIDEMIA LDL GOAL <100: ICD-10-CM

## 2024-02-05 LAB
ALBUMIN SERPL BCG-MCNC: 4.6 G/DL (ref 3.5–5.2)
ALBUMIN UR-MCNC: NEGATIVE MG/DL
ALP SERPL-CCNC: 79 U/L (ref 40–150)
ALT SERPL W P-5'-P-CCNC: 38 U/L (ref 0–50)
ANION GAP SERPL CALCULATED.3IONS-SCNC: 12 MMOL/L (ref 7–15)
APPEARANCE UR: CLEAR
AST SERPL W P-5'-P-CCNC: 25 U/L (ref 0–45)
BILIRUB SERPL-MCNC: 0.3 MG/DL
BILIRUB UR QL STRIP: NEGATIVE
BUN SERPL-MCNC: 9.7 MG/DL (ref 6–20)
CALCIUM SERPL-MCNC: 9.5 MG/DL (ref 8.6–10)
CHLORIDE SERPL-SCNC: 105 MMOL/L (ref 98–107)
CHOLEST SERPL-MCNC: 148 MG/DL
COLOR UR AUTO: YELLOW
CREAT SERPL-MCNC: 0.6 MG/DL (ref 0.51–0.95)
CREAT UR-MCNC: 50.5 MG/DL
DEPRECATED HCO3 PLAS-SCNC: 24 MMOL/L (ref 22–29)
EGFRCR SERPLBLD CKD-EPI 2021: >90 ML/MIN/1.73M2
FASTING STATUS PATIENT QL REPORTED: NO
GLUCOSE SERPL-MCNC: 205 MG/DL (ref 70–99)
GLUCOSE UR STRIP-MCNC: >=1000 MG/DL
HBA1C MFR BLD: 8.1 % (ref 0–5.6)
HDLC SERPL-MCNC: 51 MG/DL
HGB UR QL STRIP: NEGATIVE
KETONES UR STRIP-MCNC: NEGATIVE MG/DL
LDLC SERPL CALC-MCNC: 65 MG/DL
LEUKOCYTE ESTERASE UR QL STRIP: NEGATIVE
MICROALBUMIN UR-MCNC: <12 MG/L
MICROALBUMIN/CREAT UR: NORMAL MG/G{CREAT}
NITRATE UR QL: NEGATIVE
NONHDLC SERPL-MCNC: 97 MG/DL
PH UR STRIP: 5.5 [PH] (ref 5–7)
POTASSIUM SERPL-SCNC: 4.2 MMOL/L (ref 3.4–5.3)
PROT SERPL-MCNC: 7.6 G/DL (ref 6.4–8.3)
SODIUM SERPL-SCNC: 141 MMOL/L (ref 135–145)
SP GR UR STRIP: 1.01 (ref 1–1.03)
TRIGL SERPL-MCNC: 162 MG/DL
TSH SERPL DL<=0.005 MIU/L-ACNC: 1.7 UIU/ML (ref 0.3–4.2)
UROBILINOGEN UR STRIP-ACNC: 0.2 E.U./DL

## 2024-02-05 PROCEDURE — 82043 UR ALBUMIN QUANTITATIVE: CPT

## 2024-02-05 PROCEDURE — 84443 ASSAY THYROID STIM HORMONE: CPT

## 2024-02-05 PROCEDURE — 81003 URINALYSIS AUTO W/O SCOPE: CPT | Performed by: FAMILY MEDICINE

## 2024-02-05 PROCEDURE — 87086 URINE CULTURE/COLONY COUNT: CPT | Performed by: FAMILY MEDICINE

## 2024-02-05 PROCEDURE — 82570 ASSAY OF URINE CREATININE: CPT

## 2024-02-05 PROCEDURE — 80053 COMPREHEN METABOLIC PANEL: CPT

## 2024-02-05 PROCEDURE — 80061 LIPID PANEL: CPT

## 2024-02-05 PROCEDURE — 83036 HEMOGLOBIN GLYCOSYLATED A1C: CPT

## 2024-02-05 PROCEDURE — 99214 OFFICE O/P EST MOD 30 MIN: CPT | Performed by: FAMILY MEDICINE

## 2024-02-05 PROCEDURE — 36415 COLL VENOUS BLD VENIPUNCTURE: CPT

## 2024-02-05 RX ORDER — GLIPIZIDE 2.5 MG/1
2.5 TABLET, EXTENDED RELEASE ORAL DAILY
Qty: 90 TABLET | Refills: 1 | Status: SHIPPED | OUTPATIENT
Start: 2024-02-05 | End: 2024-07-15

## 2024-02-05 RX ORDER — SIMVASTATIN 40 MG
TABLET ORAL
Qty: 90 TABLET | Refills: 3 | Status: SHIPPED | OUTPATIENT
Start: 2024-02-05

## 2024-02-05 RX ORDER — METFORMIN HCL 500 MG
TABLET, EXTENDED RELEASE 24 HR ORAL
Qty: 360 TABLET | Refills: 1 | Status: SHIPPED | OUTPATIENT
Start: 2024-02-05 | End: 2024-07-15

## 2024-02-05 RX ORDER — LOSARTAN POTASSIUM 25 MG/1
25 TABLET ORAL DAILY
Qty: 90 TABLET | Refills: 3 | Status: SHIPPED | OUTPATIENT
Start: 2024-02-05

## 2024-02-05 RX ORDER — OMEPRAZOLE 40 MG/1
40 CAPSULE, DELAYED RELEASE ORAL DAILY
Qty: 90 CAPSULE | Refills: 3 | Status: SHIPPED | OUTPATIENT
Start: 2024-02-05

## 2024-02-05 RX ORDER — SEMAGLUTIDE 2.68 MG/ML
INJECTION, SOLUTION SUBCUTANEOUS
Qty: 9 ML | Refills: 1 | Status: SHIPPED | OUTPATIENT
Start: 2024-02-05 | End: 2024-07-15

## 2024-02-05 ASSESSMENT — PAIN SCALES - GENERAL: PAINLEVEL: NO PAIN (0)

## 2024-02-05 NOTE — PROGRESS NOTES
"  Assessment & Plan     Type 2 diabetes mellitus with microalbuminuria, without long-term current use of insulin (H)  Add glipizide xl 2.5 mg daily  Patient declined CGM/diabetic ed referral  Does not want to be on insulin    Continue all other current medications  Recheck HgbA1c in 3 months    - TSH with free T4 reflex; Future  - Hemoglobin A1c; Future  - Lipid panel reflex to direct LDL Fasting; Future  - Albumin Random Urine Quantitative with Creat Ratio; Future  - Comprehensive metabolic panel (BMP + Alb, Alk Phos, ALT, AST, Total. Bili, TP); Future  - empagliflozin (JARDIANCE) 25 MG TABS tablet; Take 1 tablet (25 mg) by mouth daily  - losartan (COZAAR) 25 MG tablet; Take 1 tablet (25 mg) by mouth daily  - metFORMIN (GLUCOPHAGE XR) 500 MG 24 hr tablet; TAKE TWO TABLETS BY MOUTH TWICE A DAY WITH MEALS.  - Semaglutide, 2 MG/DOSE, (OZEMPIC, 2 MG/DOSE,) 8 MG/3ML pen; INJECT 2 MG UNDER THE SKIN ONCE A WEEK  - UA Macroscopic with reflex to Microscopic and Culture - Clinic Collect  - glipiZIDE (GLUCOTROL XL) 2.5 MG 24 hr tablet; Take 1 tablet (2.5 mg) by mouth daily  - Hemoglobin A1c; Future    HTN, goal below 140/90  Well controlled  - losartan (COZAAR) 25 MG tablet; Take 1 tablet (25 mg) by mouth daily    Gastroesophageal reflux disease without esophagitis  Continue PPI  - omeprazole (PRILOSEC) 40 MG DR capsule; Take 1 capsule (40 mg) by mouth daily    Hyperlipidemia LDL goal <100  Continue statin  - simvastatin (ZOCOR) 40 MG tablet; TAKE ONE TABLET BY MOUTH EVERY NIGHT AT BEDTIME    Dysuria  No appearance of active infection  Will culture to fully r/o infection    - Urine Culture Aerobic Bacterial - lab collect; Future            BMI  Estimated body mass index is 28.63 kg/m  as calculated from the following:    Height as of this encounter: 1.473 m (4' 10\").    Weight as of this encounter: 62.1 kg (137 lb).             Rey Olvera is a 53 year old, presenting for the following health issues:  Diabetes        " 2/5/2024    12:54 PM   Additional Questions   Roomed by Crystal hart CMA   Accompanied by Self       - Recurrent UTI sx, she was last seen for this 1/9/2024. She did finished course of antibiotics but still notes frequency, burning and pain.       Diabetes Follow-up  Jardiance 25mg every day, metformin 1,000mg bid, ozempic 2mg SubQ weekly    History of Present Illness       Diabetes:   She presents for follow up of diabetes.  She is checking home blood glucose a few times a month.   She checks blood glucose before meals.  Blood glucose is never over 200 and never under 70. She is aware of hypoglycemia symptoms including shakiness, dizziness, weakness and lethargy.    She has no concerns regarding her diabetes at this time.   She is not experiencing numbness or burning in feet, excessive thirst, blurry vision, weight changes or redness, sores or blisters on feet. The patient has had a diabetic eye exam in the last 12 months. Eye exam performed on 122023. [facility]              Hyperlipidemia Follow-Up  Simvastatin 40mg qhs  Are you regularly taking any medication or supplement to lower your cholesterol?   Yes- statin  Are you having muscle aches or other side effects that you think could be caused by your cholesterol lowering medication?  No    Hypertension Follow-up  Losartan 25mg qd  Do you check your blood pressure regularly outside of the clinic? No   Are you following a low salt diet? No  Are your blood pressures ever more than 140 on the top number (systolic) OR more   than 90 on the bottom number (diastolic), for example 140/90? N/A    BP Readings from Last 2 Encounters:   02/05/24 112/72   05/25/23 116/74     Hemoglobin A1C (%)   Date Value   04/14/2023 7.5 (H)   11/17/2022 8.0 (H)   03/19/2021 6.6 (H)   08/26/2020 6.6 (H)     LDL Cholesterol Calculated   Date Value   05/25/2023 39 mg/dL   04/21/2022      Comment:     Cannot estimate LDL when triglyceride exceeds 400 mg/dL  "  03/19/2021 42 mg/dL   02/14/2020 38 mg/dL     LDL Cholesterol Direct (mg/dL)   Date Value   04/21/2022 80         Review of Systems  Constitutional, HEENT, cardiovascular, pulmonary, gi and gu systems are negative, except as otherwise noted.      Objective    /72   Pulse 100   Temp 97.6  F (36.4  C) (Tympanic)   Resp 18   Ht 1.473 m (4' 10\")   Wt 62.1 kg (137 lb)   LMP  (LMP Unknown)   SpO2 99%   BMI 28.63 kg/m    Body mass index is 28.63 kg/m .  Physical Exam   GENERAL: alert and no distress  NECK: no adenopathy, no asymmetry, masses, or scars  RESP: lungs clear to auscultation - no rales, rhonchi or wheezes  CV: regular rate and rhythm, normal S1 S2, no S3 or S4, no murmur, click or rub, no peripheral edema  MS: no gross musculoskeletal defects noted, no edema  PSYCH: mentation appears normal, affect normal/bright    Results for orders placed or performed in visit on 02/05/24   Hemoglobin A1c     Status: Abnormal   Result Value Ref Range    Hemoglobin A1C 8.1 (H) 0.0 - 5.6 %   Results for orders placed or performed in visit on 02/05/24   UA Macroscopic with reflex to Microscopic and Culture - Clinic Collect     Status: Abnormal    Specimen: Urine, Clean Catch   Result Value Ref Range    Color Urine Yellow Colorless, Straw, Light Yellow, Yellow    Appearance Urine Clear Clear    Glucose Urine >=1000 (A) Negative mg/dL    Bilirubin Urine Negative Negative    Ketones Urine Negative Negative mg/dL    Specific Gravity Urine 1.015 1.003 - 1.035    Blood Urine Negative Negative    pH Urine 5.5 5.0 - 7.0    Protein Albumin Urine Negative Negative mg/dL    Urobilinogen Urine 0.2 0.2, 1.0 E.U./dL    Nitrite Urine Negative Negative    Leukocyte Esterase Urine Negative Negative    Narrative    Microscopic not indicated             Signed Electronically by: Julia Reyna MD    "

## 2024-02-05 NOTE — PATIENT INSTRUCTIONS
"Add Glipizide 2.5 mg daily    Lab in 3 months for HgbA1c        When you are out of refills or the refills say \"zero\", it is time to schedule your next appointment in clinic!    Labs are released to you almost immediately and sometimes before I have had a chance to review them.  I review labs regularly and once they are all in, you will either be sent a letter with your results or if you are signed up for on-line services, you will be notified that results are available to you on [employer]. If there are serious findings, you typically will be called.    If you have any questions about your visit, your symptoms, your medication, your test results or it is not clear what your diagnosis or treatment plan is please contact me (via [employer]) or call the care team at [phone] option #2          "

## 2024-02-06 NOTE — RESULT ENCOUNTER NOTE
May,    These labs are normal or acceptable with the exception of HgbA1c and blood sugar.    Recheck HgbA1c in 3 months as discussed.    Please contact my office if you have questions.    Julia Reyna M.D.

## 2024-02-07 LAB — BACTERIA UR CULT: NORMAL

## 2024-02-08 ENCOUNTER — TRANSFERRED RECORDS (OUTPATIENT)
Dept: HEALTH INFORMATION MANAGEMENT | Facility: CLINIC | Age: 54
End: 2024-02-08
Payer: COMMERCIAL

## 2024-02-08 LAB — RETINOPATHY: NEGATIVE

## 2024-04-18 ENCOUNTER — TELEPHONE (OUTPATIENT)
Dept: FAMILY MEDICINE | Facility: CLINIC | Age: 54
End: 2024-04-18
Payer: COMMERCIAL

## 2024-04-18 NOTE — TELEPHONE ENCOUNTER
Patient Quality Outreach    Patient is due for the following:   None    Next Steps:   - Physical  - Vaccine update    Type of outreach:    Sent [employer] message.      Questions for provider review:    None           Crystal Siddiqi, CMA

## 2024-04-25 ENCOUNTER — LAB (OUTPATIENT)
Dept: LAB | Facility: CLINIC | Age: 54
End: 2024-04-25
Payer: COMMERCIAL

## 2024-04-25 DIAGNOSIS — R80.9 TYPE 2 DIABETES MELLITUS WITH MICROALBUMINURIA, WITHOUT LONG-TERM CURRENT USE OF INSULIN (H): ICD-10-CM

## 2024-04-25 DIAGNOSIS — E11.29 TYPE 2 DIABETES MELLITUS WITH MICROALBUMINURIA, WITHOUT LONG-TERM CURRENT USE OF INSULIN (H): ICD-10-CM

## 2024-04-25 LAB — HBA1C MFR BLD: 7 % (ref 0–5.6)

## 2024-04-25 PROCEDURE — 83036 HEMOGLOBIN GLYCOSYLATED A1C: CPT

## 2024-04-25 PROCEDURE — 36415 COLL VENOUS BLD VENIPUNCTURE: CPT

## 2024-06-02 ENCOUNTER — HEALTH MAINTENANCE LETTER (OUTPATIENT)
Age: 54
End: 2024-06-02

## 2024-07-15 DIAGNOSIS — R80.9 TYPE 2 DIABETES MELLITUS WITH MICROALBUMINURIA, WITHOUT LONG-TERM CURRENT USE OF INSULIN (H): ICD-10-CM

## 2024-07-15 DIAGNOSIS — E11.29 TYPE 2 DIABETES MELLITUS WITH MICROALBUMINURIA, WITHOUT LONG-TERM CURRENT USE OF INSULIN (H): ICD-10-CM

## 2024-07-15 RX ORDER — METFORMIN HCL 500 MG
TABLET, EXTENDED RELEASE 24 HR ORAL
Qty: 360 TABLET | Refills: 0 | Status: SHIPPED | OUTPATIENT
Start: 2024-07-15

## 2024-07-15 RX ORDER — EMPAGLIFLOZIN 25 MG/1
25 TABLET, FILM COATED ORAL DAILY
Qty: 90 TABLET | Refills: 0 | Status: SHIPPED | OUTPATIENT
Start: 2024-07-15

## 2024-07-15 RX ORDER — SEMAGLUTIDE 2.68 MG/ML
INJECTION, SOLUTION SUBCUTANEOUS
Qty: 9 ML | Refills: 0 | Status: SHIPPED | OUTPATIENT
Start: 2024-07-15

## 2024-07-15 RX ORDER — GLIPIZIDE 2.5 MG/1
2.5 TABLET, EXTENDED RELEASE ORAL DAILY
Qty: 90 TABLET | Refills: 0 | Status: SHIPPED | OUTPATIENT
Start: 2024-07-15

## 2024-07-26 DIAGNOSIS — R80.9 TYPE 2 DIABETES MELLITUS WITH MICROALBUMINURIA, WITHOUT LONG-TERM CURRENT USE OF INSULIN (H): ICD-10-CM

## 2024-07-26 DIAGNOSIS — E11.29 TYPE 2 DIABETES MELLITUS WITH MICROALBUMINURIA, WITHOUT LONG-TERM CURRENT USE OF INSULIN (H): ICD-10-CM

## 2024-07-26 RX ORDER — EMPAGLIFLOZIN 25 MG/1
25 TABLET, FILM COATED ORAL DAILY
Qty: 90 TABLET | Refills: 1 | OUTPATIENT
Start: 2024-07-26

## 2024-10-13 DIAGNOSIS — E11.29 TYPE 2 DIABETES MELLITUS WITH MICROALBUMINURIA, WITHOUT LONG-TERM CURRENT USE OF INSULIN (H): ICD-10-CM

## 2024-10-13 DIAGNOSIS — R80.9 TYPE 2 DIABETES MELLITUS WITH MICROALBUMINURIA, WITHOUT LONG-TERM CURRENT USE OF INSULIN (H): ICD-10-CM

## 2024-10-14 RX ORDER — SEMAGLUTIDE 2.68 MG/ML
INJECTION, SOLUTION SUBCUTANEOUS
Qty: 3 ML | Refills: 0 | Status: SHIPPED | OUTPATIENT
Start: 2024-10-14 | End: 2024-11-06

## 2024-10-21 ENCOUNTER — DOCUMENTATION ONLY (OUTPATIENT)
Dept: FAMILY MEDICINE | Facility: CLINIC | Age: 54
End: 2024-10-21
Payer: COMMERCIAL

## 2024-10-21 DIAGNOSIS — E11.29 TYPE 2 DIABETES MELLITUS WITH MICROALBUMINURIA, WITHOUT LONG-TERM CURRENT USE OF INSULIN (H): Primary | ICD-10-CM

## 2024-10-21 DIAGNOSIS — R80.9 TYPE 2 DIABETES MELLITUS WITH MICROALBUMINURIA, WITHOUT LONG-TERM CURRENT USE OF INSULIN (H): Primary | ICD-10-CM

## 2024-10-21 NOTE — PROGRESS NOTES
Labs ordered but patient should be notified that she is OVERDUE for a visit with me.  Please help her to schedule.    Julia Reyna M.D.

## 2024-10-21 NOTE — PROGRESS NOTES
May Wade has an upcoming lab appointment:    Future Appointments   Date Time Provider Department Center   10/22/2024  1:30 PM CL LAB [facility]   11/11/2024  9:30 AM CLMA1 [facility]     Patient is scheduled for the following lab(s): A1C    There is no order available. Please review and place either future orders or HMPO (Review of Health Maintenance Protocol Orders), as appropriate.    Health Maintenance Due   Topic    ANNUAL REVIEW OF HM ORDERS     A1C      Lashae Thomas

## 2024-10-22 ENCOUNTER — LAB (OUTPATIENT)
Dept: LAB | Facility: CLINIC | Age: 54
End: 2024-10-22
Payer: COMMERCIAL

## 2024-10-22 DIAGNOSIS — E11.29 TYPE 2 DIABETES MELLITUS WITH MICROALBUMINURIA, WITHOUT LONG-TERM CURRENT USE OF INSULIN (H): ICD-10-CM

## 2024-10-22 DIAGNOSIS — R80.9 TYPE 2 DIABETES MELLITUS WITH MICROALBUMINURIA, WITHOUT LONG-TERM CURRENT USE OF INSULIN (H): ICD-10-CM

## 2024-10-22 LAB
EST. AVERAGE GLUCOSE BLD GHB EST-MCNC: 137 MG/DL
HBA1C MFR BLD: 6.4 % (ref 0–5.6)

## 2024-10-22 PROCEDURE — 83036 HEMOGLOBIN GLYCOSYLATED A1C: CPT

## 2024-10-22 PROCEDURE — 80061 LIPID PANEL: CPT

## 2024-10-22 PROCEDURE — 80053 COMPREHEN METABOLIC PANEL: CPT

## 2024-10-22 PROCEDURE — 36415 COLL VENOUS BLD VENIPUNCTURE: CPT

## 2024-10-22 NOTE — LETTER
October 29, 2024      May Wade  [id number] [facility] [place] [address]        Dear ,    We are writing to inform you of your test results.    These labs are normal or acceptable.     Please contact my office if you have questions.    Resulted Orders   Comprehensive metabolic panel (BMP + Alb, Alk Phos, ALT, AST, Total. Bili, TP)   Result Value Ref Range    Sodium 139 135 - 145 mmol/L    Potassium 4.0 3.4 - 5.3 mmol/L    Carbon Dioxide (CO2) 25 22 - 29 mmol/L    Anion Gap 12 7 - 15 mmol/L    Urea Nitrogen 12.9 6.0 - 20.0 mg/dL    Creatinine 0.75 0.51 - 0.95 mg/dL    GFR Estimate >90 >60 mL/min/1.73m2      Comment:      eGFR calculated using 2021 CKD-EPI equation.    Calcium 9.7 8.8 - 10.4 mg/dL      Comment:      Reference intervals for this test were updated on 7/16/2024 to reflect our healthy population more accurately. There may be differences in the flagging of prior results with similar values performed with this method. Those prior results can be interpreted in the context of the updated reference intervals.    Chloride 102 98 - 107 mmol/L    Glucose 86 70 - 99 mg/dL    Alkaline Phosphatase 71 40 - 150 U/L    AST 32 0 - 45 U/L    ALT 57 (H) 0 - 50 U/L    Protein Total 7.5 6.4 - 8.3 g/dL    Albumin 4.6 3.5 - 5.2 g/dL    Bilirubin Total 0.3 <=1.2 mg/dL    Patient Fasting > 8hrs? No    Lipid panel reflex to direct LDL Fasting   Result Value Ref Range    Cholesterol 120 <200 mg/dL    Triglycerides 169 (H) <150 mg/dL    Direct Measure HDL 43 (L) >=50 mg/dL    LDL Cholesterol Calculated 43 <100 mg/dL    Non HDL Cholesterol 77 <130 mg/dL    Patient Fasting > 8hrs? No     Narrative    Cholesterol  Desirable: < 200 mg/dL  Borderline High: 200 - 239 mg/dL  High: >= 240 mg/dL    Triglycerides  Normal: < 150 mg/dL  Borderline High: 150 - 199 mg/dL  High: 200-499 mg/dL  Very High: >= 500 mg/dL    Direct Measure HDL  Female: >= 50 mg/dL   Male: >= 40 mg/dL    LDL Cholesterol  Desirable: < 100 mg/dL  Above  Desirable: 100 - 129 mg/dL   Borderline High: 130 - 159 mg/dL   High:  160 - 189 mg/dL   Very High: >= 190 mg/dL    Non HDL Cholesterol  Desirable: < 130 mg/dL  Above Desirable: 130 - 159 mg/dL  Borderline High: 160 - 189 mg/dL  High: 190 - 219 mg/dL  Very High: >= 220 mg/dL       If you have any questions or concerns, please call the clinic at the number listed above.       Sincerely,      Julia Reyna MD

## 2024-10-22 NOTE — PROGRESS NOTES
Called patient. Did not have date book with her at the time of the call but said she would schedule when she comes in this afternoon for labs. Will add noted to visit note to schedule appt for adult preventative visit.    Holley Sebastian LPN

## 2024-10-23 LAB
ALBUMIN SERPL BCG-MCNC: 4.6 G/DL (ref 3.5–5.2)
ALP SERPL-CCNC: 71 U/L (ref 40–150)
ALT SERPL W P-5'-P-CCNC: 57 U/L (ref 0–50)
ANION GAP SERPL CALCULATED.3IONS-SCNC: 12 MMOL/L (ref 7–15)
AST SERPL W P-5'-P-CCNC: 32 U/L (ref 0–45)
BILIRUB SERPL-MCNC: 0.3 MG/DL
BUN SERPL-MCNC: 12.9 MG/DL (ref 6–20)
CALCIUM SERPL-MCNC: 9.7 MG/DL (ref 8.8–10.4)
CHLORIDE SERPL-SCNC: 102 MMOL/L (ref 98–107)
CHOLEST SERPL-MCNC: 120 MG/DL
CREAT SERPL-MCNC: 0.75 MG/DL (ref 0.51–0.95)
EGFRCR SERPLBLD CKD-EPI 2021: >90 ML/MIN/1.73M2
FASTING STATUS PATIENT QL REPORTED: NO
FASTING STATUS PATIENT QL REPORTED: NO
GLUCOSE SERPL-MCNC: 86 MG/DL (ref 70–99)
HCO3 SERPL-SCNC: 25 MMOL/L (ref 22–29)
HDLC SERPL-MCNC: 43 MG/DL
LDLC SERPL CALC-MCNC: 43 MG/DL
NONHDLC SERPL-MCNC: 77 MG/DL
POTASSIUM SERPL-SCNC: 4 MMOL/L (ref 3.4–5.3)
PROT SERPL-MCNC: 7.5 G/DL (ref 6.4–8.3)
SODIUM SERPL-SCNC: 139 MMOL/L (ref 135–145)
TRIGL SERPL-MCNC: 169 MG/DL

## 2024-10-24 NOTE — RESULT ENCOUNTER NOTE
May,    These labs are normal or acceptable.    Please contact my office if you have questions.    Julia Reyna M.D.

## 2024-11-06 DIAGNOSIS — R80.9 TYPE 2 DIABETES MELLITUS WITH MICROALBUMINURIA, WITHOUT LONG-TERM CURRENT USE OF INSULIN (H): ICD-10-CM

## 2024-11-06 DIAGNOSIS — E11.29 TYPE 2 DIABETES MELLITUS WITH MICROALBUMINURIA, WITHOUT LONG-TERM CURRENT USE OF INSULIN (H): ICD-10-CM

## 2024-11-06 RX ORDER — SEMAGLUTIDE 2.68 MG/ML
INJECTION, SOLUTION SUBCUTANEOUS
Qty: 3 ML | Refills: 0 | Status: SHIPPED | OUTPATIENT
Start: 2024-11-06

## 2024-11-11 ENCOUNTER — ANCILLARY PROCEDURE (OUTPATIENT)
Dept: MAMMOGRAPHY | Facility: CLINIC | Age: 54
End: 2024-11-11
Attending: FAMILY MEDICINE
Payer: COMMERCIAL

## 2024-11-11 DIAGNOSIS — Z12.31 SCREENING MAMMOGRAM, ENCOUNTER FOR: ICD-10-CM

## 2024-11-11 PROCEDURE — 77067 SCR MAMMO BI INCL CAD: CPT | Mod: TC | Performed by: RADIOLOGY

## 2024-11-11 PROCEDURE — 77063 BREAST TOMOSYNTHESIS BI: CPT | Mod: TC | Performed by: RADIOLOGY

## 2024-11-13 DIAGNOSIS — E11.29 TYPE 2 DIABETES MELLITUS WITH MICROALBUMINURIA, WITHOUT LONG-TERM CURRENT USE OF INSULIN (H): ICD-10-CM

## 2024-11-13 DIAGNOSIS — R80.9 TYPE 2 DIABETES MELLITUS WITH MICROALBUMINURIA, WITHOUT LONG-TERM CURRENT USE OF INSULIN (H): ICD-10-CM

## 2024-11-14 RX ORDER — EMPAGLIFLOZIN 25 MG/1
TABLET, FILM COATED ORAL
Qty: 30 TABLET | Refills: 0 | Status: SHIPPED | OUTPATIENT
Start: 2024-11-14

## 2024-11-14 RX ORDER — GLIPIZIDE 2.5 MG/1
TABLET, EXTENDED RELEASE ORAL
Qty: 30 TABLET | Refills: 0 | Status: SHIPPED | OUTPATIENT
Start: 2024-11-14

## 2024-11-21 ENCOUNTER — OFFICE VISIT (OUTPATIENT)
Dept: FAMILY MEDICINE | Facility: CLINIC | Age: 54
End: 2024-11-21
Payer: COMMERCIAL

## 2024-11-21 VITALS
DIASTOLIC BLOOD PRESSURE: 80 MMHG | HEART RATE: 95 BPM | HEIGHT: 58 IN | TEMPERATURE: 97.6 F | SYSTOLIC BLOOD PRESSURE: 122 MMHG | WEIGHT: 134.38 LBS | OXYGEN SATURATION: 99 % | BODY MASS INDEX: 28.21 KG/M2

## 2024-11-21 DIAGNOSIS — D12.6 TUBULAR ADENOMA OF COLON: ICD-10-CM

## 2024-11-21 DIAGNOSIS — Z12.11 SCREEN FOR COLON CANCER: ICD-10-CM

## 2024-11-21 DIAGNOSIS — R80.9 TYPE 2 DIABETES MELLITUS WITH MICROALBUMINURIA, WITHOUT LONG-TERM CURRENT USE OF INSULIN (H): ICD-10-CM

## 2024-11-21 DIAGNOSIS — E78.5 HYPERLIPIDEMIA LDL GOAL <100: ICD-10-CM

## 2024-11-21 DIAGNOSIS — E11.29 TYPE 2 DIABETES MELLITUS WITH MICROALBUMINURIA, WITHOUT LONG-TERM CURRENT USE OF INSULIN (H): ICD-10-CM

## 2024-11-21 DIAGNOSIS — I10 HTN, GOAL BELOW 140/90: ICD-10-CM

## 2024-11-21 DIAGNOSIS — Z00.00 ROUTINE GENERAL MEDICAL EXAMINATION AT A HEALTH CARE FACILITY: Primary | ICD-10-CM

## 2024-11-21 DIAGNOSIS — K21.9 GASTROESOPHAGEAL REFLUX DISEASE WITHOUT ESOPHAGITIS: ICD-10-CM

## 2024-11-21 RX ORDER — SIMVASTATIN 40 MG
TABLET ORAL
Qty: 90 TABLET | Refills: 3 | Status: SHIPPED | OUTPATIENT
Start: 2024-11-21

## 2024-11-21 RX ORDER — GLIPIZIDE 2.5 MG/1
2.5 TABLET, EXTENDED RELEASE ORAL DAILY
Qty: 90 TABLET | Refills: 1 | Status: SHIPPED | OUTPATIENT
Start: 2024-11-21

## 2024-11-21 RX ORDER — METFORMIN HYDROCHLORIDE 500 MG/1
TABLET, EXTENDED RELEASE ORAL
Qty: 360 TABLET | Refills: 1 | Status: SHIPPED | OUTPATIENT
Start: 2024-11-21

## 2024-11-21 RX ORDER — OMEPRAZOLE 40 MG/1
40 CAPSULE, DELAYED RELEASE ORAL DAILY
Qty: 90 CAPSULE | Refills: 3 | Status: SHIPPED | OUTPATIENT
Start: 2024-11-21

## 2024-11-21 RX ORDER — SEMAGLUTIDE 2.68 MG/ML
INJECTION, SOLUTION SUBCUTANEOUS
Qty: 9 ML | Refills: 1 | Status: SHIPPED | OUTPATIENT
Start: 2024-11-21

## 2024-11-21 SDOH — HEALTH STABILITY: PHYSICAL HEALTH: ON AVERAGE, HOW MANY MINUTES DO YOU ENGAGE IN EXERCISE AT THIS LEVEL?: 30 MIN

## 2024-11-21 SDOH — HEALTH STABILITY: PHYSICAL HEALTH: ON AVERAGE, HOW MANY DAYS PER WEEK DO YOU ENGAGE IN MODERATE TO STRENUOUS EXERCISE (LIKE A BRISK WALK)?: 2 DAYS

## 2024-11-21 ASSESSMENT — SOCIAL DETERMINANTS OF HEALTH (SDOH): HOW OFTEN DO YOU GET TOGETHER WITH FRIENDS OR RELATIVES?: TWICE A WEEK

## 2024-11-21 ASSESSMENT — PAIN SCALES - GENERAL: PAINLEVEL_OUTOF10: NO PAIN (0)

## 2024-11-21 NOTE — PATIENT INSTRUCTIONS
Patient Education   Preventive Care Advice   This is general advice given by our system to help you stay healthy. However, your care team may have specific advice just for you. Please talk to your care team about your preventive care needs.  Nutrition  Eat 5 or more servings of fruits and vegetables each day.  Try wheat bread, brown rice and whole grain pasta (instead of white bread, rice, and pasta).  Get enough calcium and vitamin D. Check the label on foods and aim for 100% of the RDA (recommended daily allowance).  Lifestyle  Exercise at least 150 minutes each week  (30 minutes a day, 5 days a week).  Do muscle strengthening activities 2 days a week. These help control your weight and prevent disease.  No smoking.  Wear sunscreen to prevent skin cancer.  Have a dental exam and cleaning every 6 months.  Yearly exams  See your health care team every year to talk about:  Any changes in your health.  Any medicines your care team has prescribed.  Preventive care, family planning, and ways to prevent chronic diseases.  Shots (vaccines)   HPV shots (up to age 26), if you've never had them before.  Hepatitis B shots (up to age 59), if you've never had them before.  COVID-19 shot: Get this shot when it's due.  Flu shot: Get a flu shot every year.  Tetanus shot: Get a tetanus shot every 10 years.  Pneumococcal, hepatitis A, and RSV shots: Ask your care team if you need these based on your risk.  Shingles shot (for age 50 and up)  General health tests  Diabetes screening:  Starting at age 35, Get screened for diabetes at least every 3 years.  If you are younger than age 35, ask your care team if you should be screened for diabetes.  Cholesterol test: At age 39, start having a cholesterol test every 5 years, or more often if advised.  Bone density scan (DEXA): At age 50, ask your care team if you should have this scan for osteoporosis (brittle bones).  Hepatitis C: Get tested at least once in your life.  STIs (sexually  transmitted infections)  Before age 24: Ask your care team if you should be screened for STIs.  After age 24: Get screened for STIs if you're at risk. You are at risk for STIs (including HIV) if:  You are sexually active with more than one person.  You don't use condoms every time.  You or a partner was diagnosed with a sexually transmitted infection.  If you are at risk for HIV, ask about PrEP medicine to prevent HIV.  Get tested for HIV at least once in your life, whether you are at risk for HIV or not.  Cancer screening tests  Cervical cancer screening: If you have a cervix, begin getting regular cervical cancer screening tests starting at age 21.  Breast cancer scan (mammogram): If you've ever had breasts, begin having regular mammograms starting at age 40. This is a scan to check for breast cancer.  Colon cancer screening: It is important to start screening for colon cancer at age 45.  Have a colonoscopy test every 10 years (or more often if you're at risk) Or, ask your provider about stool tests like a FIT test every year or Cologuard test every 3 years.  To learn more about your testing options, visit:   .  For help making a decision, visit:   https://bit.ly/bw34097.  Prostate cancer screening test: If you have a prostate, ask your care team if a prostate cancer screening test (PSA) at age 55 is right for you.  Lung cancer screening: If you are a current or former smoker ages 50 to 80, ask your care team if ongoing lung cancer screenings are right for you.  For informational purposes only. Not to replace the advice of your health care provider. Copyright   2023 [employer]. All rights reserved. Clinically reviewed by the [facility]. Exclusively.in [id number] - REV 01/24.

## 2024-11-21 NOTE — PROGRESS NOTES
"Preventive Care Visit  [facility]  Julia Reyna MD, Family Medicine  Nov 21, 2024      Assessment & Plan     Routine general medical examination at a health care facility       Screen for colon cancer  Tubular adenoma - needs q3 year  - Colonoscopy Screening  Referral; Future    Type 2 diabetes mellitus with microalbuminuria, without long-term current use of insulin (H)  Well controlled with HgbA1c of 6.4%    - FOOT EXAM  - glipiZIDE (GLUCOTROL XL) 2.5 MG 24 hr tablet; Take 1 tablet (2.5 mg) by mouth daily.  - empagliflozin (JARDIANCE) 25 MG TABS tablet; Take 1 tablet (25 mg) by mouth daily.  - metFORMIN (GLUCOPHAGE XR) 500 MG 24 hr tablet; TAKE TWO TABLETS BY MOUTH TWICE DAILY WITH MEALS  - Semaglutide, 2 MG/DOSE, (OZEMPIC, 2 MG/DOSE,) 8 MG/3ML pen; INJECT 2MG UNDER THE SKIN ONCE A WEEK  - blood glucose (ACCU-CHEK GUIDE) test strip; Use to test blood sugar 2 times daily or as directed.    HTN, goal below 140/90  Well contorlled    Hyperlipidemia LDL goal <100  Continue statin  - simvastatin (ZOCOR) 40 MG tablet; TAKE ONE TABLET BY MOUTH EVERY NIGHT AT BEDTIME    Tubular adenoma of colon   As above    Gastroesophageal reflux disease without esophagitis     - omeprazole (PRILOSEC) 40 MG DR capsule; Take 1 capsule (40 mg) by mouth daily.    Patient has been advised of split billing requirements and indicates understanding: Yes        BMI  Estimated body mass index is 28.08 kg/m  as calculated from the following:    Height as of this encounter: 1.473 m (4' 10\").    Weight as of this encounter: 61 kg (134 lb 6 oz).       Counseling  Appropriate preventive services were addressed with this patient via screening, questionnaire, or discussion as appropriate for fall prevention, nutrition, physical activity, Tobacco-use cessation, social engagement, weight loss and cognition.  Checklist reviewing preventive services available has been given to the patient.  Reviewed patient's diet, " addressing concerns and/or questions.   She is at risk for lack of exercise and has been provided with information to increase physical activity for the benefit of her well-being.           Subjective   May is a 53 year old, presenting for the following:  Physical        11/21/2024     2:38 PM   Additional Questions   Roomed by Crystal HILLMAN CMA   Accompanied by Self          HPI          Health Care Directive  Patient does not have a Health Care Directive: Discussed advance care planning with patient; however, patient declined at this time.      11/21/2024   General Health   How would you rate your overall physical health? Excellent   Feel stress (tense, anxious, or unable to sleep) Not at all            11/21/2024   Nutrition   Three or more servings of calcium each day? Yes   Diet: Diabetic   How many servings of fruit and vegetables per day? 4 or more   How many sweetened beverages each day? 0-1            11/21/2024   Exercise   Days per week of moderate/strenous exercise 2 days   Average minutes spent exercising at this level 30 min      (!) EXERCISE CONCERN      11/21/2024   Social Factors   Frequency of gathering with friends or relatives Twice a week   Worry food won't last until get money to buy more No   Food not last or not have enough money for food? No   Do you have housing? (Housing is defined as stable permanent housing and does not include staying ouside in a car, in a tent, in an abandoned building, in an overnight shelter, or couch-surfing.) Yes   Are you worried about losing your housing? No   Lack of transportation? No   Unable to get utilities (heat,electricity)? No            11/21/2024   Fall Risk   Fallen 2 or more times in the past year? No    Trouble with walking or balance? No        Patient-reported          11/21/2024   Dental   Dentist two times every year? Yes            11/21/2024   TB Screening   Were you born outside of the [place]? No                  11/21/2024   Substance Use   Alcohol  "more than 3/day or more than 7/wk No   Do you use any other substances recreationally? No        Social History     Tobacco Use    Smoking status: Never    Smokeless tobacco: Never   Vaping Use    Vaping status: Never Used   Substance Use Topics    Alcohol use: Yes     Comment: 1 drink every 2 weeks    Drug use: No           11/11/2024   LAST FHS-7 RESULTS   1st degree relative breast or ovarian cancer No   Any relative bilateral breast cancer No   Any male have breast cancer No   Any ONE woman have BOTH breast AND ovarian cancer No   Any woman with breast cancer before 50yrs No   2 or more relatives with breast AND/OR ovarian cancer No   2 or more relatives with breast AND/OR bowel cancer No           Mammogram Screening - Mammogram every 1-2 years updated in Health Maintenance based on mutual decision making        11/21/2024   STI Screening   New sexual partner(s) since last STI/HIV test? No        History of abnormal Pap smear: No - age 30- 64 PAP with HPV every 5 years recommended        Latest Ref Rng & Units 4/21/2022     3:48 PM 2/29/2016     4:31 PM 2/29/2016    12:00 AM   PAP / HPV   PAP  Negative for Intraepithelial Lesion or Malignancy (NILM)      PAP (Historical)    NIL    HPV 16 DNA Negative Negative  Negative     HPV 18 DNA Negative Negative  Negative     Other HR HPV Negative Negative  Negative       ASCVD Risk   The ASCVD Risk score (Kassidy DK, et al., 2019) failed to calculate for the following reasons:    The valid total cholesterol range is 130 to 320 mg/dL           Reviewed and updated as needed this visit by Provider                          Review of Systems  Constitutional, HEENT, cardiovascular, pulmonary, gi and gu systems are negative, except as otherwise noted.     Objective    Exam  /80   Pulse 95   Temp 97.6  F (36.4  C) (Tympanic)   Ht 1.473 m (4' 10\")   Wt 61 kg (134 lb 6 oz)   LMP  (LMP Unknown)   SpO2 99%   BMI 28.08 kg/m     Estimated body mass index is 28.08 " "kg/m  as calculated from the following:    Height as of this encounter: 1.473 m (4' 10\").    Weight as of this encounter: 61 kg (134 lb 6 oz).    Physical Exam  GENERAL: alert and no distress  NECK: no adenopathy, no asymmetry, masses, or scars  RESP: lungs clear to auscultation - no rales, rhonchi or wheezes  CV: regular rate and rhythm, normal S1 S2, no S3 or S4, no murmur, click or rub, no peripheral edema  ABDOMEN: soft, nontender, no hepatosplenomegaly, no masses and bowel sounds normal  MS: no gross musculoskeletal defects noted, no edema    Diabetic Foot Screen:  Any complaints of increased pain or numbness ? No  Is there a foot ulcer now or a history of foot ulcer? No  Does the foot have an abnormal shape? No  Are the nails thick, too long or ingrown? No  Are there any redness or open areas? No         Sensation Testing done at all points on the diagram with monofilament     Right Foot: Sensation Normal at all points  Left Foot: Sensation Normal at all points     Risk Category: 0- No loss of protective sensation  Performed by uJlia Reyna MD          Signed Electronically by: Julia Reyna MD    "

## 2024-11-26 ENCOUNTER — TELEPHONE (OUTPATIENT)
Dept: GASTROENTEROLOGY | Facility: CLINIC | Age: 54
End: 2024-11-26
Payer: COMMERCIAL

## 2024-11-26 NOTE — TELEPHONE ENCOUNTER
Endoscopy Scheduling Screen      What insurance is in the chart?  Other:  [employer]    Ordering/Referring Provider: Julia Reyna MD    (If ordering provider performs procedure, schedule with ordering provider unless otherwise instructed. )    BMI: There is no height or weight on file to calculate BMI.     Sedation Ordered  general anesthesia.   BMI<= 45 45 < BMI <= 48 48 < BMI < = 50  BMI > 50   No Restrictions No [facility]  No [facility]  [facility] with exceptions Hospital Only OR Only       Do you have a history of malignant hyperthermia?  No    (Females) Are you currently pregnant?   No     Are you currently on dialysis?   No    Do you need assistance transferring?   No    BMI: There is no height or weight on file to calculate BMI.     Is patients BMI > 50?  No    BMI > 40?  No    Do you have a diagnosis of diabetes?  Yes (Golytely Prep)    Do you take an injectable medication for weight loss or diabetes (excluding insulin)?  Yes, hold time can be up to 7 days. Please consult with you prescribing provider to discuss endoscopy recommendations. (Please schedule at least 7 days out.)  OZEMPIC  Do you take the medication Naltrexone?  No    Do you take blood thinners?  No     Prep   Are you currently on dialysis or do you have chronic kidney disease?  No    Do you have a diagnosis of cystic fibrosis (CF)?  No    On a regular basis do you go 3 -5 days between bowel movements?  No    Preferred Pharmacy:  [facility]    Final Scheduling Details     Procedure scheduled  Colonoscopy    Surgeon:  Nicole     Date of procedure:  2/14/25     [facility] - Per order.    What is your communication preference for Instructions and/or Bowel Prep?   [employer]    Patient Reminders:    You will receive a call from a Nurse to review instructions and health history.  This assessment must be completed prior to your procedure.  Failure to complete the Nurse assessment may result in the procedure being cancelled.       On  the day of your procedure, please designate an adult(s) who can drive you home stay with you for the next 24 hours. The medicines used in the exam will make you sleepy. You will not be able to drive.       You cannot take public transportation, ride share services, or non-medical taxi service without a responsible caregiver.  Medical transport services are allowed with the requirement that a responsible caregiver will receive you at your destination.  We require that drivers and caregivers are confirmed prior to your procedure.

## 2024-12-12 ENCOUNTER — VIRTUAL VISIT (OUTPATIENT)
Dept: URGENT CARE | Facility: CLINIC | Age: 54
End: 2024-12-12
Payer: COMMERCIAL

## 2024-12-12 ENCOUNTER — LAB (OUTPATIENT)
Dept: LAB | Facility: CLINIC | Age: 54
End: 2024-12-12
Payer: COMMERCIAL

## 2024-12-12 DIAGNOSIS — R31.0 GROSS HEMATURIA: ICD-10-CM

## 2024-12-12 DIAGNOSIS — E11.29 TYPE 2 DIABETES MELLITUS WITH MICROALBUMINURIA, WITHOUT LONG-TERM CURRENT USE OF INSULIN (H): ICD-10-CM

## 2024-12-12 DIAGNOSIS — N30.00 ACUTE CYSTITIS WITHOUT HEMATURIA: Primary | ICD-10-CM

## 2024-12-12 DIAGNOSIS — R30.0 DYSURIA: ICD-10-CM

## 2024-12-12 DIAGNOSIS — R80.9 TYPE 2 DIABETES MELLITUS WITH MICROALBUMINURIA, WITHOUT LONG-TERM CURRENT USE OF INSULIN (H): ICD-10-CM

## 2024-12-12 LAB
ALBUMIN UR-MCNC: NEGATIVE MG/DL
APPEARANCE UR: CLEAR
BILIRUB UR QL STRIP: NEGATIVE
COLOR UR AUTO: YELLOW
CREAT UR-MCNC: 22.2 MG/DL
GLUCOSE UR STRIP-MCNC: 500 MG/DL
HGB UR QL STRIP: ABNORMAL
KETONES UR STRIP-MCNC: NEGATIVE MG/DL
LEUKOCYTE ESTERASE UR QL STRIP: ABNORMAL
MICROALBUMIN UR-MCNC: 15.3 MG/L
MICROALBUMIN/CREAT UR: 68.92 MG/G CR (ref 0–25)
NITRATE UR QL: NEGATIVE
PH UR STRIP: 6 [PH] (ref 5–7)
RBC #/AREA URNS AUTO: ABNORMAL /HPF
SP GR UR STRIP: <=1.005 (ref 1–1.03)
UROBILINOGEN UR STRIP-ACNC: 0.2 E.U./DL
WBC #/AREA URNS AUTO: ABNORMAL /HPF

## 2024-12-12 RX ORDER — CEPHALEXIN 500 MG/1
500 CAPSULE ORAL 3 TIMES DAILY
Qty: 15 CAPSULE | Refills: 0 | Status: SHIPPED | OUTPATIENT
Start: 2024-12-12 | End: 2024-12-17

## 2024-12-12 NOTE — PATIENT INSTRUCTIONS
Take full course of antibiotics- keflex three times a day for 5 days  Urine culture pending, we will call you only if culture shows resistance and change of antibiotic is required or if no growth to stop antibiotics and to follow-up with your primary care provider.  May use over the counter AZO pain relief or Uristat (phenazopyridine) for urinary burning but do not use for 24 hours before future urine tests.  Drink plenty of fluids. Limit caffeine and alcohol as these are bladder irritants.  May take tylenol or ibuprofen as needed for discomfort.   If you develop any vomiting, high fevers or lower back pain, these can be signs of a kidney infection and you should be seen in urgent care or in the ER.  Prevention of future infections by drinking cranberry juice, urination after intercourse and wiping from front to back after using the toilet.  Please follow up with primary care provider if symptoms return, if you're not improving, worsening or new symptoms or for any adverse reactions to medications.

## 2024-12-12 NOTE — PROGRESS NOTES
Assessment & Plan     Acute cystitis without hematuria  - cephALEXin (KEFLEX) 500 MG capsule; Take 1 capsule (500 mg) by mouth 3 times daily for 5 days.    Dysuria  - UA reflex to Microscopic - lab collect; Future  - Urine Culture Aerobic Bacterial - lab collect; Future    Gross hematuria  - UA reflex to Microscopic - lab collect; Future  - Urine Culture Aerobic Bacterial - lab collect; Future    Patient describes blood in urine and large blood was seen on dipstick but 0-2 RBC phpf were seen on microscopy. 25-50 WBC, will treat with keflex. Adjust based on urine culture result.    Return in about 3 days (around 12/15/2024) for visit with primary care provider if not improving, sooner if worsening.     Virginia Crews PA-C  [facility]    Subjective   May Wade is a 54 year old who presents for the following health issues    HPI    Symptom onset: 2-3 days ago. mild  Current symptoms: increased urinary frequency, gross hematuria, dysuria, lower abdominal cramping, getting much worse today. Fatigue. Low back pain  No fever, vomiting, no flank pain  Treatments: increasing fluid intake  Medication review complete.    Review of Systems   ROS negative except as stated above.      Objective    Physical Exam   GENERAL: Healthy, alert and no distress  EYES: Eyes grossly normal to inspection.  No discharge or erythema, or obvious scleral/conjunctival abnormalities.  HENT: Normal cephalic/atraumatic.  External ears, nose and mouth without ulcers or lesions.  No nasal drainage visible.  RESP: No audible cough wheeze or visible cyanosis.  No visible retractions or increased work of breathing.    SKIN: Visible skin clear. No significant rash, abnormal pigmentation or lesions.  NEURO: Cranial nerves grossly intact.  Mentation and speech appropriate for age.  PSYCH: Mentation appears normal, affect normal/bright, judgement and insight intact, normal speech and appearance well-groomed.    Type of  service:  Video Visit  Video Start Time: 12:00PM  Video End Time: 12:07PM  Originating Location (pt. Location): Home  Distant Location (provider location):  [facility]- offsite at home, [facility]  Platform used for Video Visit: [employer]    Results for orders placed or performed in visit on 12/12/24   UA reflex to Microscopic - lab collect     Status: Abnormal   Result Value Ref Range    Color Urine Yellow Colorless, Straw, Light Yellow, Yellow    Appearance Urine Clear Clear    Glucose Urine 500 (A) Negative mg/dL    Bilirubin Urine Negative Negative    Ketones Urine Negative Negative mg/dL    Specific Gravity Urine <=1.005 1.003 - 1.035    Blood Urine Large (A) Negative    pH Urine 6.0 5.0 - 7.0    Protein Albumin Urine Negative Negative mg/dL    Urobilinogen Urine 0.2 0.2, 1.0 E.U./dL    Nitrite Urine Negative Negative    Leukocyte Esterase Urine Small (A) Negative   Urine Microscopic Exam     Status: Abnormal   Result Value Ref Range    RBC Urine 0-2 0-2 /HPF /HPF    WBC Urine 25-50 (A) 0-5 /HPF /HPF

## 2025-02-07 RX ORDER — BISACODYL 5 MG/1
TABLET, DELAYED RELEASE ORAL
Qty: 4 TABLET | Refills: 0 | Status: SHIPPED | OUTPATIENT
Start: 2025-02-07 | End: 2025-02-14

## 2025-02-14 ENCOUNTER — HOSPITAL ENCOUNTER (OUTPATIENT)
Facility: CLINIC | Age: 55
Discharge: HOME | End: 2025-02-14
Attending: SURGERY | Admitting: SURGERY
Payer: COMMERCIAL

## 2025-02-14 VITALS
SYSTOLIC BLOOD PRESSURE: 115 MMHG | RESPIRATION RATE: 15 BRPM | DIASTOLIC BLOOD PRESSURE: 70 MMHG | TEMPERATURE: 98 F | HEIGHT: 58 IN | WEIGHT: 134 LBS | OXYGEN SATURATION: 97 % | HEART RATE: 64 BPM | BODY MASS INDEX: 28.13 KG/M2

## 2025-02-14 DIAGNOSIS — Z12.11 SPECIAL SCREENING FOR MALIGNANT NEOPLASMS, COLON: Primary | ICD-10-CM

## 2025-02-14 LAB
COLONOSCOPY: NORMAL
GLUCOSE BLDC GLUCOMTR-MCNC: 100 MG/DL (ref 70–99)

## 2025-02-14 PROCEDURE — 82962 GLUCOSE BLOOD TEST: CPT

## 2025-02-14 PROCEDURE — 370N000017 HC ANESTHESIA TECHNICAL FEE, PER MIN: Performed by: SURGERY

## 2025-02-14 PROCEDURE — 88305 TISSUE EXAM BY PATHOLOGIST: CPT | Mod: TC | Performed by: SURGERY

## 2025-02-14 PROCEDURE — 88305 TISSUE EXAM BY PATHOLOGIST: CPT | Mod: 26 | Performed by: PATHOLOGY

## 2025-02-14 PROCEDURE — 45380 COLONOSCOPY AND BIOPSY: CPT | Performed by: SURGERY

## 2025-02-14 PROCEDURE — 250N000011 HC RX IP 250 OP 636: Performed by: SURGERY

## 2025-02-14 PROCEDURE — 45385 COLONOSCOPY W/LESION REMOVAL: CPT | Performed by: SURGERY

## 2025-02-14 RX ORDER — NALOXONE HYDROCHLORIDE 0.4 MG/ML
0.2 INJECTION, SOLUTION INTRAMUSCULAR; INTRAVENOUS; SUBCUTANEOUS
Status: DISCONTINUED | OUTPATIENT
Start: 2025-02-14 | End: 2025-02-14 | Stop reason: HOSPADM

## 2025-02-14 RX ORDER — NALOXONE HYDROCHLORIDE 0.4 MG/ML
0.4 INJECTION, SOLUTION INTRAMUSCULAR; INTRAVENOUS; SUBCUTANEOUS
Status: DISCONTINUED | OUTPATIENT
Start: 2025-02-14 | End: 2025-02-14 | Stop reason: HOSPADM

## 2025-02-14 RX ORDER — SODIUM CHLORIDE, SODIUM LACTATE, POTASSIUM CHLORIDE, CALCIUM CHLORIDE 600; 310; 30; 20 MG/100ML; MG/100ML; MG/100ML; MG/100ML
INJECTION, SOLUTION INTRAVENOUS CONTINUOUS
Status: DISCONTINUED | OUTPATIENT
Start: 2025-02-14 | End: 2025-02-14 | Stop reason: HOSPADM

## 2025-02-14 RX ORDER — FLUMAZENIL 0.1 MG/ML
0.2 INJECTION, SOLUTION INTRAVENOUS
Status: DISCONTINUED | OUTPATIENT
Start: 2025-02-14 | End: 2025-02-14 | Stop reason: HOSPADM

## 2025-02-14 RX ORDER — LIDOCAINE 40 MG/G
CREAM TOPICAL
Status: DISCONTINUED | OUTPATIENT
Start: 2025-02-14 | End: 2025-02-14 | Stop reason: HOSPADM

## 2025-02-14 RX ORDER — ONDANSETRON 2 MG/ML
4 INJECTION INTRAMUSCULAR; INTRAVENOUS
Status: COMPLETED | OUTPATIENT
Start: 2025-02-14 | End: 2025-02-14

## 2025-02-14 RX ADMIN — ONDANSETRON 4 MG: 2 INJECTION, SOLUTION INTRAMUSCULAR; INTRAVENOUS at 08:30

## 2025-02-14 ASSESSMENT — ACTIVITIES OF DAILY LIVING (ADL)
ADLS_ACUITY_SCORE: 41

## 2025-02-14 NOTE — LETTER
May Wade  [id number] [place] [place] [address]      February 17, 2025    Dear May,  This letter is written to inform you of the results of your recent colonoscopy.  Your examination showed polyp(s) in your ascending colon and sigmoid colon. All polyps were removed in their entirety and sent for review by a pathologist. As you will see on the pathology report below, the tissue(s) were tubular adenomatous polyps and hyperplastic polyps. Your examination was otherwise without abnormality.    Adenomatous polyps are entirely benign (non-cancerous); however, patients who have developed these polyps are at an increased risk for developing additional polyps in the future. If these are not eventually removed, there is a risk of developing colon cancer. We will advise more frequent examinations with you because of the risk associated with this type of polyp.  Hyperplastic polyps are entirely benign (non-cancerous) and rarely associated with the development of additional polyps or colorectal cancer.    Final Diagnosis   A(1).   Colon, Ascending, polyp, polypectomy:  -Tubular adenoma  -Negative for high-grade dysplasia and malignancy.        B(2).   Colon, Sigmoid, polyp, polypectomy:  -Hyperplastic polyp  -Negative for dysplasia or malignancy.       Given these findings, your personal history of polyps, I recommend that you undergo a repeat colonoscopy in 5 year(s) for surveillance. We will enter you into a recall system so you receive a reminder closer to the time that you are due for repeat examination.     Please remember that this recommendation is made with the understanding that you are not experiencing persistent changes in bowel function, bleeding per rectum, and/or significant abdominal pain. If you experience these symptoms, please contact your primary care provider for a further evaluation.     If you have any questions or concerns about the results of your colonoscopy or the appropriate follow-up,  please contact my assistant at [phone]    Sincerely,      Billy Rivera, DO   [facility]  ___

## 2025-02-14 NOTE — H&P
Formerly [facility]    Pre-Endoscopy History and Physical     May Wade MRN# [id number]   YOB: 1970 Age: 54 year old     Date of Procedure: 2/14/2025  Primary care provider: Julia Reyna  Type of Endoscopy: Colonoscopy with possible biopsy, possible polypectomy  Reason for Procedure: Polyps  Type of Anesthesia Anticipated: Conscious Sedation    HPI:    May is a 54 year old female who will be undergoing the above procedure.      Last colonoscopy 2021, polyps removed.  Denies blood in stool or change in stool size. No known family history of colon cancer.    A history and physical has been performed. The patient's medications and allergies have been reviewed. The risks and benefits of the procedure and the sedation options and risks were discussed with the patient.  All questions were answered and informed consent was obtained.      She denies a personal or family history of anesthesia complications or bleeding disorders.     Patient Active Problem List   Diagnosis    Hypertriglyceridemia    Hyperlipidemia LDL goal <100    HTN, goal below 140/90    Esophageal reflux    overweight BMI >30    Type 2 diabetes mellitus with microalbuminuria, without long-term current use of insulin (H)        Past Medical History:   Diagnosis Date    cervical dysplasia 1990    s/p cryo, normal since    Chronic hip pain 10/25/2006    neurontin    gestational diabetes     pregnancy # 2    Migraines     on amitryptiline    Pure hypercholesterolemia     on lipitor        Past Surgical History:   Procedure Laterality Date    COLONOSCOPY N/A 6/3/2019    Procedure: COLONOSCOPY, WITH POLYPECTOMY AND BIOPSY;  Surgeon: Live Cao MD;  Location: [facility]    COLONOSCOPY N/A 6/11/2021    Procedure: COLONOSCOPY, WITH POLYPECTOMY AND BIOPSY;  Surgeon: Billy Rivera DO;  Location: [facility]    CRYOTHERAPY, CERVICAL  1990     HYSTEROSCOPY, SURGICAL; W/ ENDOMETRIAL ABLATION, ANY METHOD  2/7/08    [facility]asure     SURGICAL HISTORY OF -   2004    Left wrist dorsal ganglion cyst excision-Removal of the recurrent dorsal boss.    SURGICAL HISTORY OF -       Right Ankle Surgery (Ganglion Cyst)    SURGICAL HISTORY OF -       Breast Reduction    SURGICAL HISTORY OF -       Abdominal Wall Revision    SURGICAL HISTORY OF -   2003    Left Ankle Cyst Removal    SURGICAL HISTORY OF -       Left ankle surgery for ganglion cyst,     SURGICAL HISTORY OF -       Left wrist bone and cyst surgery       Social History     Tobacco Use    Smoking status: Never    Smokeless tobacco: Never   Substance Use Topics    Alcohol use: Yes     Comment: 1 drink every 2 weeks       Family History   Problem Relation Age of Onset    C.A.D. Father          age 51    Respiratory Father         smoker    Cerebrovascular Disease Maternal Grandmother     Hypertension Maternal Grandmother     C.A.D. Paternal Grandfather     Asthma No family hx of     Diabetes No family hx of     Breast Cancer No family hx of     Cancer - colorectal No family hx of     Prostate Cancer No family hx of        Prior to Admission medications    Medication Sig Start Date End Date Taking? Authorizing Provider   bisacodyl (DULCOLAX) 5 MG EC tablet Take 2 tablets at 3 pm the day before your procedure. If your procedure is before 11 am, take 2 additional tablets at 11 pm. If your procedure is after 11 am, take 2 additional tablets at 6 am. For additional instructions refer to your colonoscopy prep instructions. 25  Yes Billy Rivera, DO   polyethylene glycol (GOLYTELY) 236 g suspension The night before the exam at 6 pm drink an 8-ounce glass every 15 minutes until the jug is half empty. If you arrive before 11 AM: Drink the other half of the Golytely jug at 11 PM night before procedure. If you arrive after 11 AM: Drink the other half of the Golytely jug at 6 AM day of procedure. For additional instructions refer to your colonoscopy prep instructions.  "2/7/25  Yes Billy Rivera, DO   blood glucose (ACCU-CHEK GUIDE) test strip Use to test blood sugar 2 times daily or as directed. 11/21/24   Julia Reyna MD   blood glucose (NO BRAND SPECIFIED) lancets standard Use to test blood sugar 2 times daily or as directed. 5/25/23   Julia Reyna MD   Blood Glucose Monitoring Suppl (ACCU-CHEK GUIDE ME) w/Device KIT 1 each as needed 3/22/21   Julia Reyna MD   empagliflozin (JARDIANCE) 25 MG TABS tablet Take 1 tablet (25 mg) by mouth daily. 11/21/24   Julia Reyna MD   fish oil-omega-3 fatty acids (OMEGA 3) 1000 MG capsule Take 2 capsules by mouth 2 times daily. 1/14/13   Julia Reyna MD   glipiZIDE (GLUCOTROL XL) 2.5 MG 24 hr tablet Take 1 tablet (2.5 mg) by mouth daily. 11/21/24   Julia Reyna MD   losartan (COZAAR) 25 MG tablet Take 1 tablet (25 mg) by mouth daily 2/5/24   Julia Reyna MD   metFORMIN (GLUCOPHAGE XR) 500 MG 24 hr tablet TAKE TWO TABLETS BY MOUTH TWICE DAILY WITH MEALS 11/21/24   Julia Reyna MD   omeprazole (PRILOSEC) 40 MG DR capsule Take 1 capsule (40 mg) by mouth daily. 11/21/24   Julia Reyna MD   Semaglutide, 2 MG/DOSE, (OZEMPIC, 2 MG/DOSE,) 8 MG/3ML pen INJECT 2MG UNDER THE SKIN ONCE A WEEK 11/21/24   Julia Reyna MD   simvastatin (ZOCOR) 40 MG tablet TAKE ONE TABLET BY MOUTH EVERY NIGHT AT BEDTIME 11/21/24   Julia Reyna MD       Allergies   Allergen Reactions    Pcn [Penicillins] Nausea and Vomiting    Aspartame Other (See Comments)     Severe pain, passing out    Benzoin Dermatitis     Tincture of benzoin - skin burn/rash         REVIEW OF SYSTEMS:   5 point ROS negative except as noted above in HPI, including Gen., Resp., CV, GI &  system review.    PHYSICAL EXAM:   /80 (BP Location: Right arm)   Pulse 77   Temp 98.4  F (36.9  C) (Oral)   Ht 1.473 m (4' 10\")   Wt 60.8 kg (134 lb)   SpO2 98%   BMI 28.01 kg/m   Estimated body mass index is 28.01 kg/m  as calculated from the " "following:    Height as of this encounter: 1.473 m (4' 10\").    Weight as of this encounter: 60.8 kg (134 lb).   Constitutional: Awake, alert, no acute distress.  Eyes: No scleral icterus.  Conjunctiva are without injection.  ENMT: Mucous membranes moist, dentition and gums are intact.   Neck: Soft, supple, trachea midline.    Endocrine: n/a   Lymphatic: There is no cervical, submandibularadenopathy.  Respiratory: normal effortgs   Cardiovascular: S1, S2  Abdomen: Non-distended, non-tender,  No masses,  Musculoskeletal: No spinal or CVA tenderness. Full range of motion in the upper and lower extremities.    Skin: No skin rashes or lesions to inspection.  No petechia.    Neurologic: alerted and oriented 3x  Psychiatric: The patient's affect is not blunted and mood is appropriate.  DIAGNOSTICS:    Not indicated    IMPRESSION   ASA Class 2 - Mild systemic disease    PLAN:   Plan for Colonoscopy with possible biopsy, possible polypectomy. We discussed the risks, benefits and alternatives and the patient wished to proceed.  Patient is cleared for the above procedure.    The above has been forwarded to the consulting provider.    Billy Rivera DO  [facility]        "

## 2025-02-17 LAB
PATH REPORT.COMMENTS IMP SPEC: NORMAL
PATH REPORT.COMMENTS IMP SPEC: NORMAL
PATH REPORT.FINAL DX SPEC: NORMAL
PATH REPORT.GROSS SPEC: NORMAL
PATH REPORT.MICROSCOPIC SPEC OTHER STN: NORMAL
PATH REPORT.RELEVANT HX SPEC: NORMAL
PHOTO IMAGE: NORMAL

## 2025-02-28 PROBLEM — D12.6 ADENOMATOUS POLYP OF COLON: Status: ACTIVE | Noted: 2025-02-28

## 2025-04-07 DIAGNOSIS — I10 HTN, GOAL BELOW 140/90: ICD-10-CM

## 2025-04-07 DIAGNOSIS — E11.29 TYPE 2 DIABETES MELLITUS WITH MICROALBUMINURIA, WITHOUT LONG-TERM CURRENT USE OF INSULIN (H): ICD-10-CM

## 2025-04-07 DIAGNOSIS — R80.9 TYPE 2 DIABETES MELLITUS WITH MICROALBUMINURIA, WITHOUT LONG-TERM CURRENT USE OF INSULIN (H): ICD-10-CM

## 2025-04-07 RX ORDER — EMPAGLIFLOZIN 25 MG/1
25 TABLET, FILM COATED ORAL DAILY
Qty: 90 TABLET | Refills: 0 | Status: SHIPPED | OUTPATIENT
Start: 2025-04-07

## 2025-04-07 RX ORDER — LOSARTAN POTASSIUM 25 MG/1
25 TABLET ORAL DAILY
Qty: 90 TABLET | Refills: 0 | Status: SHIPPED | OUTPATIENT
Start: 2025-04-07

## 2025-04-07 RX ORDER — SEMAGLUTIDE 2.68 MG/ML
INJECTION, SOLUTION SUBCUTANEOUS
Qty: 9 ML | Refills: 0 | Status: SHIPPED | OUTPATIENT
Start: 2025-04-07

## 2025-04-21 ENCOUNTER — PATIENT OUTREACH (OUTPATIENT)
Dept: CARE COORDINATION | Facility: CLINIC | Age: 55
End: 2025-04-21
Payer: COMMERCIAL

## 2025-05-03 ENCOUNTER — HEALTH MAINTENANCE LETTER (OUTPATIENT)
Age: 55
End: 2025-05-03

## 2025-05-11 DIAGNOSIS — R80.9 TYPE 2 DIABETES MELLITUS WITH MICROALBUMINURIA, WITHOUT LONG-TERM CURRENT USE OF INSULIN (H): ICD-10-CM

## 2025-05-11 DIAGNOSIS — E11.29 TYPE 2 DIABETES MELLITUS WITH MICROALBUMINURIA, WITHOUT LONG-TERM CURRENT USE OF INSULIN (H): ICD-10-CM

## 2025-05-12 RX ORDER — GLIPIZIDE 2.5 MG/1
2.5 TABLET, EXTENDED RELEASE ORAL DAILY
Qty: 90 TABLET | Refills: 0 | Status: SHIPPED | OUTPATIENT
Start: 2025-05-12

## 2025-07-01 DIAGNOSIS — E11.29 TYPE 2 DIABETES MELLITUS WITH MICROALBUMINURIA, WITHOUT LONG-TERM CURRENT USE OF INSULIN (H): ICD-10-CM

## 2025-07-01 DIAGNOSIS — I10 HTN, GOAL BELOW 140/90: ICD-10-CM

## 2025-07-01 DIAGNOSIS — R80.9 TYPE 2 DIABETES MELLITUS WITH MICROALBUMINURIA, WITHOUT LONG-TERM CURRENT USE OF INSULIN (H): ICD-10-CM

## 2025-07-01 RX ORDER — SEMAGLUTIDE 2.68 MG/ML
INJECTION, SOLUTION SUBCUTANEOUS
Qty: 3 ML | Refills: 0 | Status: SHIPPED | OUTPATIENT
Start: 2025-07-01

## 2025-07-01 RX ORDER — GLIPIZIDE 2.5 MG/1
2.5 TABLET, EXTENDED RELEASE ORAL DAILY
Qty: 30 TABLET | Refills: 0 | Status: SHIPPED | OUTPATIENT
Start: 2025-07-01

## 2025-07-01 RX ORDER — EMPAGLIFLOZIN 25 MG/1
TABLET, FILM COATED ORAL
Qty: 30 TABLET | Refills: 0 | Status: SHIPPED | OUTPATIENT
Start: 2025-07-01

## 2025-07-01 RX ORDER — LOSARTAN POTASSIUM 25 MG/1
TABLET ORAL
Qty: 30 TABLET | Refills: 0 | Status: SHIPPED | OUTPATIENT
Start: 2025-07-01

## 2025-07-01 NOTE — TELEPHONE ENCOUNTER
Requested Prescriptions   Pending Prescriptions Disp Refills    glipiZIDE (GLUCOTROL XL) 2.5 MG 24 hr tablet [Pharmacy Med Name: GLIPIZIDE ER 2.5MG TB24] 90 tablet 0     Sig: TAKE ONE TABLET BY MOUTH ONCE DAILY (NEED TO BE SEEN IN CLINIC FOR FURTHER REFILLS)       Sulfonylurea Agents Failed - 7/1/2025  9:28 AM        Failed - Patient has documented A1c within the specified period of time.     If HgbA1C is 8 or greater, it needs to be on file within the past 3 months.  If less than 8, must be on file within the past 6 months.     Recent Labs   Lab Test 10/22/24  1325   A1C 6.4*             Failed - Recent (6 month) or future (90 days) visit with the authorizing provider's specialty (provided they have been seen in the past 9 months)     The patient must have completed an in-person or virtual visit within the past 6 months or has a future visit scheduled within the next 90 days with the authorizing provider s specialty.  Urgent care and e-visits do not quality as an office visit for this protocol.          Passed - Medication is active on med list and the sig matches. RN to manually verify dose and sig if red X/fail.     If the protocol passes (green check), you do not need to verify med dose and sig.    A prescription matches if they are the same clinical intention.    For Example: once daily and every morning are the same.    The protocol can not identify upper and lower case letters as matching and will fail.     For Example: Take 1 tablet (50 mg) by mouth daily     TAKE 1 TABLET (50 MG) BY MOUTH DAILY    For all fails (red x), verify dose and sig.    If the refill does match what is on file, the RN can still proceed to approve the refill request.       If they do not match, route to the appropriate provider.             Passed - Has GFR on file in past 12 months and most recent value is normal        Passed - Medication indicated for associated diagnosis     Medication is associated with one or more of the  following diagnoses:  Maturity-onset diabetes of the young   Peripheral circulatory disorder due to type 2 diabetes mellitus   Type 2 diabetes mellitus           Passed - Patient is age 18 or older        Passed - No active pregnancy on record        Passed - Patient has a recent creatinine (normal) within the past 12 mos.     Recent Labs   Lab Test 10/22/24  1325   CR 0.75                 Passed - Patient has not had a positive pregnancy test within the past 12 mos.          JARDIANCE 25 MG TABS tablet [Pharmacy Med Name: JARDIANCE 25MG TABS] 90 tablet 0     Sig: TAKE ONE TABLET BY MOUTH ONCE DAILY *NEEDS DIABETES APPOINTMENT*       Sodium Glucose Co-Transport Inhibitor Agents Failed - 7/1/2025  9:28 AM        Failed - Patient has documented A1c within the specified period of time.     If HgbA1C is 8 or greater, it needs to be on file within the past 3 months.  If less than 8, must be on file within the past 6 months.     Recent Labs   Lab Test 10/22/24  1325   A1C 6.4*             Failed - Medication is active on med list and the sig matches. RN to manually verify dose and sig if red X/fail.     If the protocol passes (green check), you do not need to verify med dose and sig.    A prescription matches if they are the same clinical intention.    For Example: once daily and every morning are the same.    The protocol can not identify upper and lower case letters as matching and will fail.     For Example: Take 1 tablet (50 mg) by mouth daily     TAKE 1 TABLET (50 MG) BY MOUTH DAILY    For all fails (red x), verify dose and sig.    If the refill does match what is on file, the RN can still proceed to approve the refill request.       If they do not match, route to the appropriate provider.             Failed - Recent (6 month) or future (90 days) visit with the authorizing provider's specialty (provided they have been seen in the past 9 months)     The patient must have completed an in-person or virtual visit within  the past 6 months or has a future visit scheduled within the next 90 days with the authorizing provider s specialty.  Urgent care and e-visits do not quality as an office visit for this protocol.          Passed - Medication indicated for associated diagnosis     Medication is associated with one or more of the following diagnoses:     Diabetic nephropathy, With Albuminuria - Type 2 diabetes mellitus     Disorder of cardiovascular system; Prophylaxis - Type 2 diabetes mellitus     Type 2 diabetes mellitus    Disorder of cardiovascular system; Prophylaxis - Heart failure   Chronic kidney disease, (At risk of progression) to reduce the risk of sustained   estimated GFR decline, end-stage kidney disease, cardiovascular death,   and hospitalization for heart failure     Heart failure, (NYHA class II to IV, reduced ejection fraction) to reduce risk of  cardiovascular death and hospitalization           Passed - Has GFR on file in past 12 months and most recent value is >30        Passed - Patient is age 18 or older        Passed - Patient is not pregnant        Passed - Patient has documented normal Potassium within the last 12 mos.     Recent Labs   Lab Test 10/22/24  1325   POTASSIUM 4.0             Passed - Patient has no positive pregnancy test within the past 12 mos.          OZEMPIC (2 MG/DOSE) 8 MG/3ML pen [Pharmacy Med Name: OZEMPIC (2 MG/DOSE) 8MG/3ML SOPN] 9 mL 0     Sig: INJECT 2MG UNDER THE SKIN ONCE A WEEK       GLP-1 Agonists Protocol Failed - 7/1/2025  9:28 AM        Failed - HgbA1C in past 3 or 6 months     If HgbA1C is 8 or greater, it needs to be on file within the past 3 months.  If less than 8, must be on file within the past 6 months.     Recent Labs   Lab Test 10/22/24  1325   A1C 6.4*             Failed - Recent (6 month) or future (90 days) visit with the authorizing provider's specialty (provided they have been seen in the past 9 months)     The patient must have completed an in-person or virtual  visit within the past 6 months or has a future visit scheduled within the next 90 days with the authorizing provider s specialty.  Urgent care and e-visits do not quality as an office visit for this protocol.          Passed - Medication is active on med list and the sig matches. RN to manually verify dose and sig if red X/fail.     If the protocol passes (green check), you do not need to verify med dose and sig.    A prescription matches if they are the same clinical intention.    For Example: once daily and every morning are the same.    The protocol can not identify upper and lower case letters as matching and will fail.     For Example: Take 1 tablet (50 mg) by mouth daily     TAKE 1 TABLET (50 MG) BY MOUTH DAILY    For all fails (red x), verify dose and sig.    If the refill does match what is on file, the RN can still proceed to approve the refill request.       If they do not match, route to the appropriate provider.             Passed - Has GFR on file in past 12 months and most recent value is normal        Passed - Medication indicated for associated diagnosis     Medication is associated with one or more of the following diagnoses:     Type 2 diabetes mellitus           Passed - Patient is age 18 or older        Passed - No active pregnancy on record        Passed - No positive pregnancy test in past 12 months          losartan (COZAAR) 25 MG tablet [Pharmacy Med Name: LOSARTAN POTASSIUM 25MG TABS] 90 tablet 0     Sig: TAKE ONE TABLET BY MOUTH ONCE DAILY *NEEDS DIABETES APPOINTMENT*       Angiotensin-II Receptors Failed - 7/1/2025  9:28 AM        Failed - Medication is active on med list and the sig matches. RN to manually verify dose and sig if red X/fail.     If the protocol passes (green check), you do not need to verify med dose and sig.    A prescription matches if they are the same clinical intention.    For Example: once daily and every morning are the same.    The protocol can not identify upper  and lower case letters as matching and will fail.     For Example: Take 1 tablet (50 mg) by mouth daily     TAKE 1 TABLET (50 MG) BY MOUTH DAILY    For all fails (red x), verify dose and sig.    If the refill does match what is on file, the RN can still proceed to approve the refill request.       If they do not match, route to the appropriate provider.             Passed - Most recent blood pressure under 140/90 in past 12 months     BP Readings from Last 3 Encounters:   02/14/25 115/70   11/21/24 122/80   02/05/24 112/72       No data recorded            Passed - Has GFR on file in past 12 months and most recent value is normal        Passed - Medication indicated for associated diagnosis     The medication is prescribed for one or more of the following conditions:    Chronic Kidney Disease (CDK)    Heart Failure (HF)    Diabetes, Nephropathy   Hypertension    Coronary Artery Disease (CAD)   Raynaud's Disease   Ischemic cardiomyopathy   Cardiomyopathy   Pulmonary Hypertension          Passed - Recent (12 month) or future (90 days) visit with authorizing provider's specialty (provided they have been seen in the past 15 months)     The patient must have completed an in-person or virtual visit within the past 12 months or has a future visit scheduled within the next 90 days with the authorizing provider s specialty.  Urgent care and e-visits do not qualify as an office visit for this protocol.          Passed - Patient is age 18 or older        Passed - No active pregnancy on record        Passed - Normal serum potassium on file in past 12 months     Recent Labs   Lab Test 10/22/24  1325   POTASSIUM 4.0                    Passed - No positive pregnancy test in past 12 months

## 2025-07-07 DIAGNOSIS — R80.9 TYPE 2 DIABETES MELLITUS WITH MICROALBUMINURIA, WITHOUT LONG-TERM CURRENT USE OF INSULIN (H): ICD-10-CM

## 2025-07-07 DIAGNOSIS — I10 HTN, GOAL BELOW 140/90: ICD-10-CM

## 2025-07-07 DIAGNOSIS — E11.29 TYPE 2 DIABETES MELLITUS WITH MICROALBUMINURIA, WITHOUT LONG-TERM CURRENT USE OF INSULIN (H): ICD-10-CM

## 2025-07-07 RX ORDER — LOSARTAN POTASSIUM 25 MG/1
TABLET ORAL
Qty: 30 TABLET | Refills: 0 | OUTPATIENT
Start: 2025-07-07

## 2025-07-21 DIAGNOSIS — E11.29 TYPE 2 DIABETES MELLITUS WITH MICROALBUMINURIA, WITHOUT LONG-TERM CURRENT USE OF INSULIN (H): ICD-10-CM

## 2025-07-21 DIAGNOSIS — R80.9 TYPE 2 DIABETES MELLITUS WITH MICROALBUMINURIA, WITHOUT LONG-TERM CURRENT USE OF INSULIN (H): ICD-10-CM

## 2025-07-21 RX ORDER — SEMAGLUTIDE 2.68 MG/ML
INJECTION, SOLUTION SUBCUTANEOUS
Qty: 3 ML | Refills: 0 | Status: SHIPPED | OUTPATIENT
Start: 2025-07-21

## 2025-07-23 DIAGNOSIS — R80.9 TYPE 2 DIABETES MELLITUS WITH MICROALBUMINURIA, WITHOUT LONG-TERM CURRENT USE OF INSULIN (H): ICD-10-CM

## 2025-07-23 DIAGNOSIS — I10 HTN, GOAL BELOW 140/90: ICD-10-CM

## 2025-07-23 DIAGNOSIS — E11.29 TYPE 2 DIABETES MELLITUS WITH MICROALBUMINURIA, WITHOUT LONG-TERM CURRENT USE OF INSULIN (H): ICD-10-CM

## 2025-07-23 RX ORDER — LOSARTAN POTASSIUM 25 MG/1
25 TABLET ORAL DAILY
Qty: 90 TABLET | Refills: 3 | Status: SHIPPED | OUTPATIENT
Start: 2025-07-23

## 2025-08-13 DIAGNOSIS — R80.9 TYPE 2 DIABETES MELLITUS WITH MICROALBUMINURIA, WITHOUT LONG-TERM CURRENT USE OF INSULIN (H): ICD-10-CM

## 2025-08-13 DIAGNOSIS — E11.29 TYPE 2 DIABETES MELLITUS WITH MICROALBUMINURIA, WITHOUT LONG-TERM CURRENT USE OF INSULIN (H): ICD-10-CM

## 2025-08-14 RX ORDER — SEMAGLUTIDE 2.68 MG/ML
INJECTION, SOLUTION SUBCUTANEOUS
Qty: 3 ML | Refills: 0 | Status: SHIPPED | OUTPATIENT
Start: 2025-08-14

## (undated) DEVICE — ENDO FORCEP ENDOJAW BIOPSY 3.7MMX230CM FB-222U

## (undated) DEVICE — ENDO SNARE EXACTO COLD 9MM LOOP 2.4MMX230CM 00711115

## (undated) RX ORDER — ONDANSETRON 2 MG/ML
INJECTION INTRAMUSCULAR; INTRAVENOUS
Status: DISPENSED
Start: 2021-06-11

## (undated) RX ORDER — SCOLOPAMINE TRANSDERMAL SYSTEM 1 MG/1
PATCH, EXTENDED RELEASE TRANSDERMAL
Status: DISPENSED
Start: 2021-06-11

## (undated) RX ORDER — PROPOFOL 10 MG/ML
INJECTION, EMULSION INTRAVENOUS
Status: DISPENSED
Start: 2025-02-14

## (undated) RX ORDER — ONDANSETRON 2 MG/ML
INJECTION INTRAMUSCULAR; INTRAVENOUS
Status: DISPENSED
Start: 2025-02-14

## (undated) RX ORDER — ONDANSETRON 2 MG/ML
INJECTION INTRAMUSCULAR; INTRAVENOUS
Status: DISPENSED
Start: 2019-06-03

## (undated) RX ORDER — PROPOFOL 10 MG/ML
INJECTION, EMULSION INTRAVENOUS
Status: DISPENSED
Start: 2021-06-11

## (undated) RX ORDER — GLYCOPYRROLATE 0.2 MG/ML
INJECTION, SOLUTION INTRAMUSCULAR; INTRAVENOUS
Status: DISPENSED
Start: 2021-06-11

## (undated) RX ORDER — LIDOCAINE HYDROCHLORIDE 10 MG/ML
INJECTION, SOLUTION EPIDURAL; INFILTRATION; INTRACAUDAL; PERINEURAL
Status: DISPENSED
Start: 2021-06-11

## (undated) RX ORDER — DEXAMETHASONE SODIUM PHOSPHATE 4 MG/ML
INJECTION, SOLUTION INTRA-ARTICULAR; INTRALESIONAL; INTRAMUSCULAR; INTRAVENOUS; SOFT TISSUE
Status: DISPENSED
Start: 2019-06-03